# Patient Record
Sex: FEMALE | Race: WHITE | Employment: FULL TIME | URBAN - NONMETROPOLITAN AREA
[De-identification: names, ages, dates, MRNs, and addresses within clinical notes are randomized per-mention and may not be internally consistent; named-entity substitution may affect disease eponyms.]

---

## 2017-01-09 ENCOUNTER — OFFICE VISIT (OUTPATIENT)
Dept: PRIMARY CARE CLINIC | Age: 27
End: 2017-01-09
Payer: COMMERCIAL

## 2017-01-09 VITALS
WEIGHT: 293 LBS | TEMPERATURE: 97.8 F | RESPIRATION RATE: 16 BRPM | BODY MASS INDEX: 43.4 KG/M2 | DIASTOLIC BLOOD PRESSURE: 70 MMHG | HEIGHT: 69 IN | SYSTOLIC BLOOD PRESSURE: 110 MMHG | HEART RATE: 74 BPM | OXYGEN SATURATION: 98 %

## 2017-01-09 DIAGNOSIS — R63.5 WEIGHT GAIN: Primary | ICD-10-CM

## 2017-01-09 DIAGNOSIS — M72.2 PLANTAR FASCIITIS, BILATERAL: ICD-10-CM

## 2017-01-09 DIAGNOSIS — F41.9 ANXIETY: ICD-10-CM

## 2017-01-09 LAB — TSH SERPL DL<=0.05 MIU/L-ACNC: 3.96 UIU/ML (ref 0.27–4.2)

## 2017-01-09 PROCEDURE — 36415 COLL VENOUS BLD VENIPUNCTURE: CPT | Performed by: NURSE PRACTITIONER

## 2017-01-09 PROCEDURE — 99214 OFFICE O/P EST MOD 30 MIN: CPT | Performed by: NURSE PRACTITIONER

## 2017-01-09 RX ORDER — PHENTERMINE HYDROCHLORIDE 37.5 MG/1
37.5 CAPSULE ORAL EVERY MORNING
Qty: 30 CAPSULE | Refills: 0 | Status: SHIPPED | OUTPATIENT
Start: 2017-01-09 | End: 2017-04-24 | Stop reason: SDUPTHER

## 2017-01-09 RX ORDER — METHYLPREDNISOLONE 4 MG/1
TABLET ORAL
Qty: 1 KIT | Refills: 0 | Status: SHIPPED | OUTPATIENT
Start: 2017-01-09 | End: 2017-09-19 | Stop reason: SDUPTHER

## 2017-01-09 RX ORDER — FLUOXETINE 20 MG/1
30 TABLET, FILM COATED ORAL DAILY
Qty: 45 TABLET | Refills: 3 | Status: SHIPPED | OUTPATIENT
Start: 2017-01-09 | End: 2017-02-13 | Stop reason: ALTCHOICE

## 2017-01-10 PROBLEM — F41.9 ANXIETY: Status: ACTIVE | Noted: 2017-01-10

## 2017-02-13 ENCOUNTER — OFFICE VISIT (OUTPATIENT)
Dept: PRIMARY CARE CLINIC | Age: 27
End: 2017-02-13
Payer: COMMERCIAL

## 2017-02-13 VITALS
HEART RATE: 67 BPM | OXYGEN SATURATION: 99 % | BODY MASS INDEX: 43.4 KG/M2 | WEIGHT: 293 LBS | RESPIRATION RATE: 16 BRPM | DIASTOLIC BLOOD PRESSURE: 80 MMHG | HEIGHT: 69 IN | TEMPERATURE: 97.9 F | SYSTOLIC BLOOD PRESSURE: 110 MMHG

## 2017-02-13 DIAGNOSIS — F41.8 DEPRESSION WITH ANXIETY: Primary | ICD-10-CM

## 2017-02-13 PROCEDURE — 99213 OFFICE O/P EST LOW 20 MIN: CPT | Performed by: NURSE PRACTITIONER

## 2017-02-13 RX ORDER — CLONAZEPAM 1 MG/1
TABLET ORAL
Qty: 60 TABLET | Refills: 0 | Status: SHIPPED | OUTPATIENT
Start: 2017-02-13 | End: 2017-04-24 | Stop reason: SDUPTHER

## 2017-02-27 ENCOUNTER — OFFICE VISIT (OUTPATIENT)
Dept: PRIMARY CARE CLINIC | Age: 27
End: 2017-02-27
Payer: COMMERCIAL

## 2017-02-27 VITALS
HEIGHT: 69 IN | BODY MASS INDEX: 43.4 KG/M2 | RESPIRATION RATE: 16 BRPM | DIASTOLIC BLOOD PRESSURE: 80 MMHG | WEIGHT: 293 LBS | SYSTOLIC BLOOD PRESSURE: 112 MMHG | TEMPERATURE: 98.1 F | OXYGEN SATURATION: 98 % | HEART RATE: 63 BPM

## 2017-02-27 DIAGNOSIS — F41.8 DEPRESSION WITH ANXIETY: Primary | ICD-10-CM

## 2017-02-27 PROCEDURE — 99213 OFFICE O/P EST LOW 20 MIN: CPT | Performed by: NURSE PRACTITIONER

## 2017-03-06 RX ORDER — PERMETHRIN 50 MG/G
CREAM TOPICAL
Qty: 1 TUBE | Refills: 1 | Status: SHIPPED | OUTPATIENT
Start: 2017-03-06 | End: 2017-04-24

## 2017-04-24 ENCOUNTER — OFFICE VISIT (OUTPATIENT)
Dept: PRIMARY CARE CLINIC | Age: 27
End: 2017-04-24
Payer: COMMERCIAL

## 2017-04-24 VITALS
TEMPERATURE: 97.4 F | BODY MASS INDEX: 43.4 KG/M2 | RESPIRATION RATE: 16 BRPM | SYSTOLIC BLOOD PRESSURE: 110 MMHG | HEART RATE: 81 BPM | WEIGHT: 293 LBS | HEIGHT: 69 IN | DIASTOLIC BLOOD PRESSURE: 70 MMHG

## 2017-04-24 DIAGNOSIS — F41.8 DEPRESSION WITH ANXIETY: Primary | ICD-10-CM

## 2017-04-24 PROCEDURE — 99213 OFFICE O/P EST LOW 20 MIN: CPT | Performed by: NURSE PRACTITIONER

## 2017-04-24 RX ORDER — FLUOXETINE HYDROCHLORIDE 20 MG/1
20 CAPSULE ORAL DAILY
Qty: 30 CAPSULE | Refills: 11 | Status: SHIPPED | OUTPATIENT
Start: 2017-04-24 | End: 2018-05-22 | Stop reason: SDUPTHER

## 2017-04-24 RX ORDER — PHENTERMINE HYDROCHLORIDE 37.5 MG/1
37.5 CAPSULE ORAL EVERY MORNING
Qty: 30 CAPSULE | Refills: 0 | Status: SHIPPED | OUTPATIENT
Start: 2017-04-24 | End: 2017-05-24

## 2017-04-24 RX ORDER — CLONAZEPAM 1 MG/1
TABLET ORAL
Qty: 30 TABLET | Refills: 0 | Status: SHIPPED | OUTPATIENT
Start: 2017-04-24 | End: 2018-03-20

## 2017-05-01 PROBLEM — F41.8 DEPRESSION WITH ANXIETY: Status: ACTIVE | Noted: 2017-05-01

## 2017-06-08 ENCOUNTER — TELEPHONE (OUTPATIENT)
Dept: PRIMARY CARE CLINIC | Age: 27
End: 2017-06-08

## 2017-06-08 RX ORDER — CIPROFLOXACIN 250 MG/1
250 TABLET, FILM COATED ORAL 2 TIMES DAILY
Qty: 14 TABLET | Refills: 0 | Status: SHIPPED | OUTPATIENT
Start: 2017-06-08 | End: 2017-06-15

## 2017-09-19 ENCOUNTER — OFFICE VISIT (OUTPATIENT)
Dept: PRIMARY CARE CLINIC | Age: 27
End: 2017-09-19
Payer: COMMERCIAL

## 2017-09-19 VITALS
RESPIRATION RATE: 16 BRPM | TEMPERATURE: 97.6 F | HEART RATE: 84 BPM | HEIGHT: 69 IN | BODY MASS INDEX: 43.4 KG/M2 | WEIGHT: 293 LBS

## 2017-09-19 DIAGNOSIS — J40 BRONCHITIS: Primary | ICD-10-CM

## 2017-09-19 DIAGNOSIS — J02.9 SORE THROAT: ICD-10-CM

## 2017-09-19 LAB — S PYO AG THROAT QL: NORMAL

## 2017-09-19 PROCEDURE — 99213 OFFICE O/P EST LOW 20 MIN: CPT | Performed by: NURSE PRACTITIONER

## 2017-09-19 PROCEDURE — 87880 STREP A ASSAY W/OPTIC: CPT | Performed by: NURSE PRACTITIONER

## 2017-09-19 PROCEDURE — 96372 THER/PROPH/DIAG INJ SC/IM: CPT | Performed by: NURSE PRACTITIONER

## 2017-09-19 RX ORDER — GUAIFENESIN AND CODEINE PHOSPHATE 100; 10 MG/5ML; MG/5ML
10 SOLUTION ORAL EVERY 4 HOURS PRN
Qty: 120 ML | Refills: 0 | Status: SHIPPED | OUTPATIENT
Start: 2017-09-19 | End: 2017-09-26

## 2017-09-19 RX ORDER — METHYLPREDNISOLONE 4 MG/1
TABLET ORAL
Qty: 1 KIT | Refills: 0 | Status: SHIPPED | OUTPATIENT
Start: 2017-09-19 | End: 2017-09-25

## 2017-09-19 RX ORDER — TRIAMCINOLONE ACETONIDE 40 MG/ML
40 INJECTION, SUSPENSION INTRA-ARTICULAR; INTRAMUSCULAR ONCE
Status: COMPLETED | OUTPATIENT
Start: 2017-09-19 | End: 2017-09-19

## 2017-09-19 RX ORDER — AZITHROMYCIN 250 MG/1
TABLET, FILM COATED ORAL
Qty: 1 PACKET | Refills: 0 | Status: SHIPPED | OUTPATIENT
Start: 2017-09-19 | End: 2017-09-29

## 2017-09-19 RX ADMIN — TRIAMCINOLONE ACETONIDE 40 MG: 40 INJECTION, SUSPENSION INTRA-ARTICULAR; INTRAMUSCULAR at 17:15

## 2017-09-19 ASSESSMENT — ENCOUNTER SYMPTOMS
SINUS PRESSURE: 1
SORE THROAT: 1
COUGH: 1

## 2018-03-01 DIAGNOSIS — Z32.00 POSSIBLE PREGNANCY: ICD-10-CM

## 2018-03-01 DIAGNOSIS — Z32.00 POSSIBLE PREGNANCY: Primary | ICD-10-CM

## 2018-03-01 LAB — GONADOTROPIN, CHORIONIC (HCG) QUANT: 1134 MIU/ML (ref 0–5.3)

## 2018-03-02 ENCOUNTER — TELEPHONE (OUTPATIENT)
Dept: PRIMARY CARE CLINIC | Age: 28
End: 2018-03-02

## 2018-03-02 RX ORDER — .BETA.-CAROTENE, ASCORBIC ACID, CHOLECALCIFEROL, .ALPHA.-TOCOPHEROL ACETATE, DL-, THIAMINE MONONITRATE, RIBOFLAVIN, NIACINAMIDE, PYRIDOXINE HYDROCHLORIDE, FOLIC ACID, CYANOCOBALAMIN, CALCIUM PANTOTHENATE, CALCIUM CARBONATE, FERROUS FUMARATE, ZINC OXIDE, AND DOCUSATE SODIUM 1000; 100; 400; 30; 3; 3; 15; 20; 1; 12; 7; 200; 29; 20; 25 [IU]/1; MG/1; [IU]/1; [IU]/1; MG/1; MG/1; MG/1; MG/1; MG/1; UG/1; MG/1; MG/1; MG/1; MG/1; MG/1
1 TABLET, COATED ORAL DAILY
Qty: 30 TABLET | Refills: 5 | Status: SHIPPED | OUTPATIENT
Start: 2018-03-02 | End: 2019-02-17

## 2018-03-20 ENCOUNTER — HOSPITAL ENCOUNTER (OUTPATIENT)
Dept: ULTRASOUND IMAGING | Age: 28
Discharge: HOME | End: 2018-03-20
Payer: COMMERCIAL

## 2018-03-20 ENCOUNTER — INITIAL PRENATAL (OUTPATIENT)
Dept: OBGYN | Age: 28
End: 2018-03-20

## 2018-03-20 VITALS
WEIGHT: 293 LBS | SYSTOLIC BLOOD PRESSURE: 123 MMHG | HEART RATE: 93 BPM | DIASTOLIC BLOOD PRESSURE: 84 MMHG | BODY MASS INDEX: 46.96 KG/M2

## 2018-03-20 DIAGNOSIS — Z12.4 SCREENING FOR CERVICAL CANCER: ICD-10-CM

## 2018-03-20 DIAGNOSIS — R35.0 URINARY FREQUENCY: ICD-10-CM

## 2018-03-20 DIAGNOSIS — O36.80X0 ENCOUNTER TO DETERMINE FETAL VIABILITY OF PREGNANCY, SINGLE OR UNSPECIFIED FETUS: ICD-10-CM

## 2018-03-20 DIAGNOSIS — Z11.3 SCREEN FOR STD (SEXUALLY TRANSMITTED DISEASE): ICD-10-CM

## 2018-03-20 DIAGNOSIS — Z34.81 ENCOUNTER FOR SUPERVISION OF OTHER NORMAL PREGNANCY IN FIRST TRIMESTER: Primary | ICD-10-CM

## 2018-03-20 LAB
APPEARANCE FLUID: NORMAL
BILIRUBIN, POC: NORMAL
BLOOD URINE, POC: NORMAL
CLARITY, POC: NORMAL
COLOR, POC: NORMAL
GLUCOSE URINE, POC: NORMAL
KETONES, POC: NORMAL
LEUKOCYTE EST, POC: NORMAL
NITRITE, POC: NORMAL
PH, POC: 5.5
PROTEIN, POC: 3
SPECIFIC GRAVITY, POC: 1.02
UROBILINOGEN, POC: NORMAL

## 2018-03-20 PROCEDURE — 76817 TRANSVAGINAL US OBSTETRIC: CPT

## 2018-03-20 PROCEDURE — 0500F INITIAL PRENATAL CARE VISIT: CPT | Performed by: NURSE PRACTITIONER

## 2018-03-20 NOTE — PROGRESS NOTES
Pt presents today for Initial prenatal exam. She had pregnancy confirmed with HCG. She is c/o cramping and urinary urgency. She denies any vaginal bleeding, leaking. No fetal movement. Last pap smear : 2014, pap done today. This is her 3rd pregnancy. She had normal pregnancy with vaginal delivery last pregnancy. First pregnancy was miscarriage. Will try to have viability US today, otherwise schedule. Plan of care was discussed with patient. Patient was encouraged to adhere to a well-balanced diet, including increasing water intake and limiting excessive caffeine and salt. The benefits of exercise were discussed; however she was advised against heavy lifting, sit-ups and abdominal crunches. A list of safe OTC medications was provided and discussed. The patient was cautioned against the use of tanning beds, hot tubs, saunas, and x-rays. Avoidance of tobacco, alcohol and illicit drugs was also discussed due to harmful effects on the fetus and increased risks associated with pregnancy. Certain labs and ultrasounds are required at certain times during pregnancy but others are optional, including the serum integrated screen/Clint/AFP/ Panorama, and other genetic testing. The patient was encouraged to attend childbirth classes and general hospital information was provided based on patients hospital of choice. All questions answered.

## 2018-04-19 ENCOUNTER — ROUTINE PRENATAL (OUTPATIENT)
Dept: OBGYN | Age: 28
End: 2018-04-19

## 2018-04-19 VITALS — WEIGHT: 293 LBS | DIASTOLIC BLOOD PRESSURE: 86 MMHG | BODY MASS INDEX: 49.03 KG/M2 | SYSTOLIC BLOOD PRESSURE: 124 MMHG

## 2018-04-19 DIAGNOSIS — O09.291 SUPERVISION OF HIGH-RISK PREGNANCY WITH HISTORY OF ABORTION IN FIRST TRIMESTER: Primary | ICD-10-CM

## 2018-04-19 DIAGNOSIS — Z36.9 ENCOUNTER FOR ANTENATAL SCREENING OF MOTHER: ICD-10-CM

## 2018-04-19 PROCEDURE — 0502F SUBSEQUENT PRENATAL CARE: CPT | Performed by: NURSE PRACTITIONER

## 2018-04-23 ENCOUNTER — TELEPHONE (OUTPATIENT)
Dept: OBGYN | Age: 28
End: 2018-04-23

## 2018-04-26 ENCOUNTER — TELEPHONE (OUTPATIENT)
Dept: OBGYN | Age: 28
End: 2018-04-26

## 2018-05-17 ENCOUNTER — ROUTINE PRENATAL (OUTPATIENT)
Dept: OBGYN | Age: 28
End: 2018-05-17

## 2018-05-17 VITALS
WEIGHT: 293 LBS | HEART RATE: 88 BPM | BODY MASS INDEX: 49.62 KG/M2 | DIASTOLIC BLOOD PRESSURE: 90 MMHG | SYSTOLIC BLOOD PRESSURE: 124 MMHG

## 2018-05-17 DIAGNOSIS — O09.292 SUPERVISION OF HIGH-RISK PREGNANCY WITH HISTORY OF ABORTION IN SECOND TRIMESTER: Primary | ICD-10-CM

## 2018-05-17 LAB
ABO/RH: NORMAL
ANTIBODY SCREEN: NORMAL
CHLAMYDIA TRACHOMATIS AMPLIFIED DET: NEGATIVE
HEPATITIS B SURFACE ANTIGEN INTERPRETATION: NORMAL
HIV-1 AND HIV-2 ANTIBODIES: NORMAL
MS ALPHA-FETOPROTEIN: NORMAL
N GONORRHOEAE AMPLIFIED DET: NEGATIVE
RPR: NORMAL
RUBELLA ANTIBODY IGG: NORMAL

## 2018-05-17 PROCEDURE — 0502F SUBSEQUENT PRENATAL CARE: CPT | Performed by: OBSTETRICS & GYNECOLOGY

## 2018-05-22 ENCOUNTER — TELEPHONE (OUTPATIENT)
Dept: OBGYN | Age: 28
End: 2018-05-22

## 2018-05-22 RX ORDER — AMOXICILLIN AND CLAVULANATE POTASSIUM 875; 125 MG/1; MG/1
1 TABLET, FILM COATED ORAL 2 TIMES DAILY
Qty: 14 TABLET | Refills: 0 | Status: SHIPPED | OUTPATIENT
Start: 2018-05-22 | End: 2018-05-29

## 2018-05-22 RX ORDER — FLUOXETINE HYDROCHLORIDE 20 MG/1
20 CAPSULE ORAL DAILY
Qty: 30 CAPSULE | Refills: 11 | Status: SHIPPED | OUTPATIENT
Start: 2018-05-22 | End: 2018-06-15 | Stop reason: SDUPTHER

## 2018-06-15 RX ORDER — FLUOXETINE HYDROCHLORIDE 20 MG/1
20 CAPSULE ORAL DAILY
Qty: 30 CAPSULE | Refills: 11 | Status: SHIPPED | OUTPATIENT
Start: 2018-06-15 | End: 2019-02-07 | Stop reason: SDUPTHER

## 2018-06-15 RX ORDER — LORATADINE 10 MG/1
10 TABLET ORAL DAILY
Qty: 30 TABLET | Refills: 3 | Status: SHIPPED | OUTPATIENT
Start: 2018-06-15 | End: 2018-09-26

## 2018-06-20 ENCOUNTER — ROUTINE PRENATAL (OUTPATIENT)
Dept: OBGYN | Age: 28
End: 2018-06-20

## 2018-06-20 ENCOUNTER — HOSPITAL ENCOUNTER (OUTPATIENT)
Dept: ULTRASOUND IMAGING | Age: 28
Discharge: HOME | End: 2018-06-20
Payer: COMMERCIAL

## 2018-06-20 VITALS
WEIGHT: 293 LBS | DIASTOLIC BLOOD PRESSURE: 82 MMHG | SYSTOLIC BLOOD PRESSURE: 130 MMHG | BODY MASS INDEX: 49.32 KG/M2 | HEART RATE: 98 BPM

## 2018-06-20 DIAGNOSIS — O09.292 SUPERVISION OF HIGH-RISK PREGNANCY WITH HISTORY OF ABORTION IN SECOND TRIMESTER: Primary | ICD-10-CM

## 2018-06-20 DIAGNOSIS — O09.292 SUPERVISION OF HIGH-RISK PREGNANCY WITH HISTORY OF ABORTION IN SECOND TRIMESTER: ICD-10-CM

## 2018-06-20 PROCEDURE — 0502F SUBSEQUENT PRENATAL CARE: CPT | Performed by: OBSTETRICS & GYNECOLOGY

## 2018-06-20 PROCEDURE — 76805 OB US >/= 14 WKS SNGL FETUS: CPT

## 2018-07-18 ENCOUNTER — ROUTINE PRENATAL (OUTPATIENT)
Dept: OBGYN | Age: 28
End: 2018-07-18

## 2018-07-18 VITALS — SYSTOLIC BLOOD PRESSURE: 124 MMHG | WEIGHT: 293 LBS | DIASTOLIC BLOOD PRESSURE: 80 MMHG | BODY MASS INDEX: 49.91 KG/M2

## 2018-07-18 DIAGNOSIS — O26.899 RH NEGATIVE STATE IN ANTEPARTUM PERIOD: ICD-10-CM

## 2018-07-18 DIAGNOSIS — Z67.91 RH NEGATIVE STATE IN ANTEPARTUM PERIOD: ICD-10-CM

## 2018-07-18 DIAGNOSIS — O09.292 SUPERVISION OF HIGH-RISK PREGNANCY WITH HISTORY OF ABORTION IN SECOND TRIMESTER: Primary | ICD-10-CM

## 2018-07-18 DIAGNOSIS — R73.09 ABNORMAL GLUCOSE MEASUREMENT: ICD-10-CM

## 2018-07-18 DIAGNOSIS — O09.292 SUPERVISION OF HIGH-RISK PREGNANCY WITH HISTORY OF ABORTION IN SECOND TRIMESTER: ICD-10-CM

## 2018-07-18 LAB
GLUCOSE, 1HR PP: 175 MG/DL (ref 75–140)
HCT VFR BLD CALC: 35.1 % (ref 37–47)
HEMOGLOBIN: 11.4 G/DL (ref 12–16)
MCH RBC QN AUTO: 29.1 PG (ref 27–31)
MCHC RBC AUTO-ENTMCNC: 32.5 G/DL (ref 33–37)
MCV RBC AUTO: 89.5 FL (ref 81–99)
PDW BLD-RTO: 13.7 % (ref 11.5–14.5)
PLATELET # BLD: 245 K/UL (ref 130–400)
PMV BLD AUTO: 11.7 FL (ref 9.4–12.3)
RBC # BLD: 3.92 M/UL (ref 4.2–5.4)
WBC # BLD: 9.5 K/UL (ref 4.8–10.8)

## 2018-07-18 PROCEDURE — 0502F SUBSEQUENT PRENATAL CARE: CPT | Performed by: OBSTETRICS & GYNECOLOGY

## 2018-07-18 NOTE — PATIENT INSTRUCTIONS
Patient Education        Weeks 22 to 26 of Your Pregnancy: Care Instructions  Your Care Instructions    As you enter your 7th month of pregnancy at week 26, your baby's lungs are growing stronger and getting ready to breathe. You may notice that your baby responds to the sound of your or your partner's voice. You may also notice that your baby does less turning and twisting and more squirming or jerking. Jerking often means that your baby has the hiccups. Hiccups are perfectly normal and are only temporary. You may want to think about attending a childbirth preparation class. This is also a good time to start thinking about whether you want to have pain medicine during labor. Most pregnant women are tested for gestational diabetes between weeks 25 and 28. Gestational diabetes occurs when your blood sugar level gets too high when you're pregnant. The test is important, because you can have gestational diabetes and not know it. But the condition can cause problems for your baby. Follow-up care is a key part of your treatment and safety. Be sure to make and go to all appointments, and call your doctor if you are having problems. It's also a good idea to know your test results and keep a list of the medicines you take. How can you care for yourself at home? Ease discomfort from your baby's kicking  · Change your position. Sometimes this will cause your baby to change position too. · Take a deep breath while you raise your arm over your head. Then breathe out while you drop your arm. Do Kegel exercises to prevent urine from leaking  · You can do Kegel exercises while you stand or sit. ¨ Squeeze the same muscles you would use to stop your urine. Your belly and thighs should not move. ¨ Hold the squeeze for 3 seconds, and then relax for 3 seconds. ¨ Start with 3 seconds. Then add 1 second each week until you are able to squeeze for 10 seconds. ¨ Repeat the exercise 10 to 15 times for each session.  Do three or more sessions each day. Ease or reduce swelling in your feet, ankles, hands, and fingers  · If your fingers are puffy, take off your rings. · Do not eat high-salt foods, such as potato chips. · Prop up your feet on a stool or couch as much as possible. Sleep with pillows under your feet. · Do not stand for long periods of time or wear tight shoes. · Wear support stockings. Where can you learn more? Go to https://ABOVE Solutions.Birchstreet [email or web address] org and sign in to your Whole Sale Fund account. Enter G264 in the TSO3 box to learn more about \"Weeks 22 to 26 of Your Pregnancy: Care Instructions. \"     If you do not have an account, please click on the \"Sign Up Now\" link. Current as of: November 21, 2017  Content Version: 11.6  © 9635-7100 You.i, Incorporated. Care instructions adapted under license by [facility] ([facility]). If you have questions about a medical condition or this instruction, always ask your healthcare professional. [address] any warranty or liability for your use of this information.

## 2018-08-14 DIAGNOSIS — Z67.91 RH NEGATIVE STATE IN ANTEPARTUM PERIOD: ICD-10-CM

## 2018-08-14 DIAGNOSIS — O09.292 SUPERVISION OF HIGH-RISK PREGNANCY WITH HISTORY OF ABORTION IN SECOND TRIMESTER: ICD-10-CM

## 2018-08-14 DIAGNOSIS — R73.09 ABNORMAL GLUCOSE MEASUREMENT: ICD-10-CM

## 2018-08-14 DIAGNOSIS — O26.899 RH NEGATIVE STATE IN ANTEPARTUM PERIOD: ICD-10-CM

## 2018-08-15 ENCOUNTER — ROUTINE PRENATAL (OUTPATIENT)
Dept: OBGYN | Age: 28
End: 2018-08-15
Payer: COMMERCIAL

## 2018-08-15 VITALS — DIASTOLIC BLOOD PRESSURE: 80 MMHG | WEIGHT: 293 LBS | BODY MASS INDEX: 50.21 KG/M2 | SYSTOLIC BLOOD PRESSURE: 138 MMHG

## 2018-08-15 DIAGNOSIS — O26.892 RH NEGATIVE STATUS DURING PREGNANCY IN SECOND TRIMESTER: ICD-10-CM

## 2018-08-15 DIAGNOSIS — O09.293 SUPERVISION OF HIGH-RISK PREGNANCY WITH HISTORY OF ABORTION IN THIRD TRIMESTER: Primary | ICD-10-CM

## 2018-08-15 DIAGNOSIS — Z67.91 RH NEGATIVE STATUS DURING PREGNANCY IN SECOND TRIMESTER: ICD-10-CM

## 2018-08-15 LAB
ABO/RH: NORMAL
ANTIBODY SCREEN: NORMAL
GLUCOSE FASTING: 104 MG/DL (ref 75–110)
GLUCOSE TOLERANCE TEST 1 HOUR: 130 MG/DL (ref 75–190)
GLUCOSE TOLERANCE TEST 2 HOUR: 68 MG/DL (ref 75–140)
GLUCOSE, 3 HOUR: 66 MG/DL (ref 75–145)
RHIG LOT NUMBER: NORMAL

## 2018-08-15 PROCEDURE — 96372 THER/PROPH/DIAG INJ SC/IM: CPT | Performed by: OBSTETRICS & GYNECOLOGY

## 2018-08-15 PROCEDURE — 0502F SUBSEQUENT PRENATAL CARE: CPT | Performed by: OBSTETRICS & GYNECOLOGY

## 2018-08-15 NOTE — PROGRESS NOTES
Pt presents today for a routine prenatal visit. Pt denies vaginal bleeding, leaking of fluid or cramping. Pt states + fetal movement. 3D U/S today, rhogam injection today. After obtaining consent, and per orders of Dr. Talia Carrasquillo, injection of rhogam given in Right upper quad. gluteus by Oleksandr Robert. Patient instructed to remain in clinic for 20 minutes afterwards, and to report any adverse reaction to me immediately.

## 2018-08-15 NOTE — PATIENT INSTRUCTIONS
Patient Education        Weeks 26 to 30 of Your Pregnancy: Care Instructions  Your Care Instructions    You are now in your last trimester of pregnancy. Your baby is growing rapidly. And you'll probably feel your baby moving around more often. Your doctor may ask you to count your baby's kicks. Your back may ache as your body gets used to your baby's size and length. If you haven't already had the Tdap shot during this pregnancy, talk to your doctor about getting it. It will help protect your  against pertussis infection. During this time, it's important to take care of yourself and pay attention to what your body needs. If you feel sexual, explore ways to be close with your partner that match your comfort and desire. Use the tips provided in this care sheet to find ways to be sexual in your own way. Follow-up care is a key part of your treatment and safety. Be sure to make and go to all appointments, and call your doctor if you are having problems. It's also a good idea to know your test results and keep a list of the medicines you take. How can you care for yourself at home? Take it easy at work  · Take frequent breaks. If possible, stop working when you are tired, and rest during your lunch hour. · Take bathroom breaks every 2 hours. · Change positions often. If you sit for long periods, stand up and walk around. · When you stand for a long time, keep one foot on a low stool with your knee bent. After standing a lot, sit with your feet up. · Avoid fumes, chemicals, and tobacco smoke. Be sexual in your own way  · Having sex during pregnancy is okay, unless your doctor tells you not to. · You may be very interested in sex, or you may have no interest at all. · Your growing belly can make it hard to find a good position during intercourse. [place] and explore. · You may get cramps in your uterus when your partner touches your breasts.   · A back rub may relieve the backache or cramps that sometimes follow orgasm. Learn about  labor  · Watch for signs of  labor. You may be going into labor if:  ¨ You have menstrual-like cramps, with or without nausea. ¨ You have about 6 or more contractions in 1 hour, even after you have had a glass of water and are resting. ¨ You have a low, dull backache that does not go away when you change your position. ¨ You have pain or pressure in your pelvis that comes and goes in a pattern. ¨ You have intestinal cramping or flu-like symptoms, with or without diarrhea. ¨ You notice an increase or change in your vaginal discharge. Discharge may be heavy, mucus-like, watery, or streaked with blood. ¨ Your water breaks. · If you think you have  labor:  ¨ Drink 2 or 3 glasses of water or juice. Not drinking enough fluids can cause contractions. ¨ Stop what you are doing, and empty your bladder. Then lie down on your left side for at least 1 hour. ¨ While lying on your side, find your breast bone. Put your fingers in the soft spot just below it. Move your fingers down toward your belly button to find the top of your uterus. Check to see if it is tight. ¨ Contractions can be weak or strong. Record your contractions for an hour. Time a contraction from the start of one contraction to the start of the next one. ¨ Single or several strong contractions without a pattern are called [facility] contractions. They are practice contractions but not the start of labor. They often stop if you change what you are doing. ¨ Call your doctor if you have regular contractions. Where can you learn more? Go to https://[email or web address].Coinkite. org and sign in to your [email or web address] account. Enter H751 in the Reach.ly box to learn more about \"Weeks 26 to 30 of Your Pregnancy: Care Instructions. \"     If you do not have an account, please click on the \"Sign Up Now\" link.   Current as of: 2017  Content Version: 11.7  © 4562-4618 Healthwise, Incorporated. Care instructions adapted under license by [facility] ([facility]). If you have questions about a medical condition or this instruction, always ask your healthcare professional. [address] any warranty or liability for your use of this information.

## 2018-08-24 ENCOUNTER — TELEPHONE (OUTPATIENT)
Dept: OBGYN | Age: 28
End: 2018-08-24

## 2018-08-24 ENCOUNTER — HOSPITAL ENCOUNTER (OUTPATIENT)
Age: 28
Setting detail: OBSERVATION
Discharge: HOME | End: 2018-08-24
Attending: OBSTETRICS & GYNECOLOGY | Admitting: OBSTETRICS & GYNECOLOGY
Payer: COMMERCIAL

## 2018-08-24 VITALS
WEIGHT: 293 LBS | HEIGHT: 69 IN | HEART RATE: 84 BPM | DIASTOLIC BLOOD PRESSURE: 76 MMHG | BODY MASS INDEX: 43.4 KG/M2 | TEMPERATURE: 98 F | SYSTOLIC BLOOD PRESSURE: 146 MMHG | RESPIRATION RATE: 20 BRPM

## 2018-08-24 PROBLEM — R10.9 CRAMPING AFFECTING PREGNANCY, ANTEPARTUM: Status: ACTIVE | Noted: 2018-08-24

## 2018-08-24 PROBLEM — O26.899 CRAMPING AFFECTING PREGNANCY, ANTEPARTUM: Status: ACTIVE | Noted: 2018-08-24

## 2018-08-24 LAB
AMORPHOUS: ABNORMAL /HPF
BILIRUBIN URINE: NEGATIVE
BLOOD, URINE: NEGATIVE
CASTS 2: ABNORMAL /LPF
CASTS: ABNORMAL /LPF
CLARITY: ABNORMAL
COLOR: YELLOW
EPITHELIAL CELLS, UA: ABNORMAL /HPF
GLUCOSE URINE: NEGATIVE MG/DL
KETONES, URINE: NEGATIVE MG/DL
LEUKOCYTE ESTERASE, URINE: ABNORMAL
NITRITE, URINE: NEGATIVE
PH UA: 6
PROTEIN UA: NEGATIVE MG/DL
RBC UA: ABNORMAL /HPF (ref 0–2)
SPECIFIC GRAVITY UA: 1.02
UROBILINOGEN, URINE: 1 E.U./DL
WBC UA: ABNORMAL /HPF (ref 0–5)

## 2018-08-24 PROCEDURE — G0378 HOSPITAL OBSERVATION PER HR: HCPCS

## 2018-08-24 PROCEDURE — 4500000002 HC ER NO CHARGE

## 2018-08-24 PROCEDURE — 59025 FETAL NON-STRESS TEST: CPT

## 2018-08-24 PROCEDURE — 81001 URINALYSIS AUTO W/SCOPE: CPT

## 2018-08-24 RX ORDER — ONDANSETRON 2 MG/ML
4 INJECTION INTRAMUSCULAR; INTRAVENOUS EVERY 6 HOURS PRN
Status: DISCONTINUED | OUTPATIENT
Start: 2018-08-24 | End: 2018-08-24 | Stop reason: HOSPADM

## 2018-08-24 RX ORDER — ACETAMINOPHEN 325 MG/1
650 TABLET ORAL EVERY 4 HOURS PRN
Status: DISCONTINUED | OUTPATIENT
Start: 2018-08-24 | End: 2018-08-24 | Stop reason: HOSPADM

## 2018-08-29 ENCOUNTER — ROUTINE PRENATAL (OUTPATIENT)
Dept: OBGYN | Age: 28
End: 2018-08-29

## 2018-08-29 VITALS
DIASTOLIC BLOOD PRESSURE: 86 MMHG | HEART RATE: 98 BPM | SYSTOLIC BLOOD PRESSURE: 126 MMHG | WEIGHT: 293 LBS | BODY MASS INDEX: 50.21 KG/M2

## 2018-08-29 DIAGNOSIS — O09.293 SUPERVISION OF HIGH-RISK PREGNANCY WITH HISTORY OF ABORTION IN THIRD TRIMESTER: Primary | ICD-10-CM

## 2018-08-29 PROCEDURE — 0502F SUBSEQUENT PRENATAL CARE: CPT | Performed by: OBSTETRICS & GYNECOLOGY

## 2018-08-29 NOTE — PATIENT INSTRUCTIONS
Patient Education        Weeks 30 to 28 of Your Pregnancy: Care Instructions  Your Care Instructions    You have made it to the final months of your pregnancy. By now, your baby is really starting to look like a baby, with hair and plump skin. As you enter the final weeks of pregnancy, the reality of having a baby may start to set in. This is the time to settle on a name, get your household in order, set up a safe nursery, and find quality  if needed. Doing these things in advance will allow you to focus on caring for and enjoying your new baby. You may also want to have a tour of your hospital's labor and delivery unit to get a better idea of what to expect while you are in the hospital.  During these last months, it is very important to take good care of yourself and pay attention to what your body needs. If your doctor says it is okay for you to work, don't push yourself too hard. Use the tips provided in this care sheet to ease heartburn and care for varicose veins. If you haven't already had the Tdap shot during this pregnancy, talk to your doctor about getting it. It will help protect your  against pertussis infection. Follow-up care is a key part of your treatment and safety. Be sure to make and go to all appointments, and call your doctor if you are having problems. It's also a good idea to know your test results and keep a list of the medicines you take. How can you care for yourself at home? Pay attention to your baby's movements  · You should feel your baby move several times every day. · Your baby now turns less, and kicks and jabs more. · Your baby sleeps 20 to 45 minutes at a time and is more active at certain times of day. · If your doctor wants you to count your baby's kicks:  ¨ Empty your bladder, and lie on your side or relax in a comfortable chair. ¨ Write down your start time. ¨ Pay attention only to your baby's movements. Count any movement except hiccups.   ¨ After you have counted 10 movements, write down your stop time. ¨ Write down how many minutes it took for your baby to move 10 times. ¨ If an hour goes by and you have not recorded 10 movements, have something to eat or drink and then count for another hour. If you do not record 10 movements in either hour, call your doctor. Ease heartburn  · Eat small, frequent meals. · Do not eat chocolate, peppermint, or very spicy foods. Avoid drinks with caffeine, such as coffee, tea, and sodas. · Avoid bending over or lying down after meals. · Talk a short walk after you eat. · If heartburn is a problem at night, do not eat for 2 hours before bedtime. · Take antacids like Mylanta, Maalox, Rolaids, or Tums. Do not take antacids that have sodium bicarbonate. Care for varicose veins  · Varicose veins are blood vessels that stretch out with the extra blood during pregnancy. Your legs may ache or throb. Most varicose veins will go away after the birth. · Avoid standing for long periods of time. Sit with your legs crossed at the ankles, not the knees. · Sit with your feet propped up. · Avoid tight clothing or stockings. Wear support hose. · Exercise regularly. Try walking for at least 30 minutes a day. Where can you learn more? Go to https://Respect Network.Biocontrol. org and sign in to your Qwalytics account. Enter Z399 in the [facility] box to learn more about \"Weeks 30 to 32 of Your Pregnancy: Care Instructions. \"     If you do not have an account, please click on the \"Sign Up Now\" link. Current as of: November 21, 2017  Content Version: 11.7  © 8008-3929 Pikanote. Care instructions adapted under license by [facility] ([facility]). If you have questions about a medical condition or this instruction, always ask your healthcare professional. [address]  any warranty or liability for your use of this information.

## 2018-09-07 ENCOUNTER — TELEPHONE (OUTPATIENT)
Dept: OBGYN | Age: 28
End: 2018-09-07

## 2018-09-12 ENCOUNTER — ROUTINE PRENATAL (OUTPATIENT)
Dept: OBGYN | Age: 28
End: 2018-09-12

## 2018-09-12 VITALS — DIASTOLIC BLOOD PRESSURE: 88 MMHG | BODY MASS INDEX: 49.91 KG/M2 | WEIGHT: 293 LBS | SYSTOLIC BLOOD PRESSURE: 134 MMHG

## 2018-09-12 DIAGNOSIS — O09.293 SUPERVISION OF HIGH-RISK PREGNANCY WITH HISTORY OF ABORTION IN THIRD TRIMESTER: Primary | ICD-10-CM

## 2018-09-12 PROCEDURE — 0502F SUBSEQUENT PRENATAL CARE: CPT | Performed by: OBSTETRICS & GYNECOLOGY

## 2018-09-12 NOTE — PATIENT INSTRUCTIONS
taking an over-the-counter stool softener. Consider breastfeeding  · Experts recommend that women breastfeed for 1 year or longer. Breast milk is the perfect food for babies. · Breast milk is easier for babies to digest than formula. And it is always available, just the right temperature, and free. · Breast milk may help protect your child from some health problems.  babies are less likely than formula-fed babies to:  ¨ Get ear infections, colds, diarrhea, and pneumonia. ¨ Be obese or get diabetes later in life. · Women who breastfeed have less bleeding after the birth. Their uteruses also shrink back faster. · Some women who breastfeed lose weight faster. Making milk burns calories. · Breastfeeding can lower your risk of breast cancer, ovarian cancer, and osteoporosis. Decide about circumcision for boys  · As you make this decision, it may help to think about your personal, Buddhist, and family traditions. You get to decide if you will keep your son's penis natural or if he will be circumcised. · If you decide that you would like to have your baby circumcised, talk with your doctor. You can share your concerns about pain. And you can discuss your preferences for anesthesia. Where can you learn more? Go to https://SCIO Diamond CorporationpepicSensulineb.healthExistence Before Essence. org and sign in to your Buccaneer account. Enter Y189 in the Elixir Pharmaceuticals box to learn more about \"Weeks 32 to 34 of Your Pregnancy: Care Instructions. \"     If you do not have an account, please click on the \"Sign Up Now\" link. Current as of: November 21, 2017  Content Version: 11.7  © 0645-5148 Global Employment Solutions, Incorporated. Care instructions adapted under license by [facility] ([facility]). If you have questions about a medical condition or this instruction, always ask your healthcare professional. [place] [address] any warranty or liability for your use of this information.

## 2018-09-26 ENCOUNTER — ROUTINE PRENATAL (OUTPATIENT)
Dept: OBGYN | Age: 28
End: 2018-09-26

## 2018-09-26 VITALS
DIASTOLIC BLOOD PRESSURE: 86 MMHG | HEART RATE: 99 BPM | WEIGHT: 293 LBS | SYSTOLIC BLOOD PRESSURE: 132 MMHG | BODY MASS INDEX: 50.5 KG/M2

## 2018-09-26 DIAGNOSIS — O09.293 SUPERVISION OF HIGH-RISK PREGNANCY WITH HISTORY OF ABORTION IN THIRD TRIMESTER: Primary | ICD-10-CM

## 2018-09-26 PROCEDURE — 0502F SUBSEQUENT PRENATAL CARE: CPT | Performed by: OBSTETRICS & GYNECOLOGY

## 2018-09-26 NOTE — PROGRESS NOTES
Pt presents today for a routine prenatal visit. Pt denies vaginal bleeding, leaking of fluid or cramping. Pt states + fetal movement. GBS to be done next visit.

## 2018-10-02 ENCOUNTER — ROUTINE PRENATAL (OUTPATIENT)
Dept: OBGYN | Age: 28
End: 2018-10-02
Payer: COMMERCIAL

## 2018-10-02 VITALS
WEIGHT: 293 LBS | SYSTOLIC BLOOD PRESSURE: 138 MMHG | BODY MASS INDEX: 50.5 KG/M2 | HEART RATE: 100 BPM | DIASTOLIC BLOOD PRESSURE: 86 MMHG

## 2018-10-02 DIAGNOSIS — Z3A.35 35 WEEKS GESTATION OF PREGNANCY: ICD-10-CM

## 2018-10-02 DIAGNOSIS — O09.293 SUPERVISION OF HIGH-RISK PREGNANCY WITH HISTORY OF ABORTION IN THIRD TRIMESTER: ICD-10-CM

## 2018-10-02 DIAGNOSIS — O13.3 TRANSIENT HYPERTENSION OF PREGNANCY IN THIRD TRIMESTER: Primary | ICD-10-CM

## 2018-10-02 PROCEDURE — 0502F SUBSEQUENT PRENATAL CARE: CPT | Performed by: NURSE PRACTITIONER

## 2018-10-02 PROCEDURE — 59025 FETAL NON-STRESS TEST: CPT | Performed by: NURSE PRACTITIONER

## 2018-10-02 NOTE — PROGRESS NOTES
Pt is here for routine parental care. Pt denies vaginal bleeding, cramping or leaking of fluid. Pt states + fetal movement. Pt reports having elevated BP at work and feeling dizzy, 120/100. Pt denies headaches. NST: baseline FHR 130s, no contractions, +acels, no decels, moderate variability. Serial bp's normal. WE given until after baby born per pt request. To keep scheduled appt and call to be seen earlier for problems.  to continue checking bp at home. All questions answered.

## 2018-10-02 NOTE — PATIENT INSTRUCTIONS
blood pressure monitor to be sure that it is accurate and that the cuff fits you. Also ask your doctor to watch you to make sure that you are using it right. ¨ You should not eat, use tobacco products, or use medicine known to raise blood pressure (such as some nasal decongestant sprays) before you take your blood pressure. ¨ Avoid taking your blood pressure if you have just exercised or are nervous or upset. Rest at least 15 minutes before you take your blood pressure. · If your doctor advises, check the protein levels in your urine. Your doctor or nurse will show you how to do this. · Take your medicines exactly as prescribed. Call your doctor if you think you are having a problem with your medicine. · Do not smoke. Quitting smoking will help lower your blood pressure and improve your baby's growth and health. If you need help quitting, talk to your doctor about stop-smoking programs and medicines. These can increase your chances of quitting for good. · Eat a balanced and healthy diet that has lots of fruits and vegetables. · If your doctor advised bed rest, be sure to stay off your feet and rest as much as possible. ¨ Keep a phone, phone book, notepad, and pen near the bed where you can easily reach them. ¨ Gently stretch your legs every hour to maintain good blood flow. ¨ Have another family member pack snacks and lunch food in a cooler close to your bed. ¨ Use this time for activities that you usually cannot find time for, such as reading, craft projects, or letter writing. · You can keep track of your baby's health by noting the length of time it takes to count 10 movements (such as kicks, flutters, or rolls). Feeling 10 movements in less than 1 hour is considered normal. Track your baby's movements once each day, and bring this record with you to each prenatal visit. When should you call for help? Call 911 anytime you think you may need emergency care.  For example, call if:    · You passed out (lost consciousness).     · You have a seizure.    Call your doctor now or seek immediate medical care if:    · You have symptoms of preeclampsia, such as:  ¨ Sudden swelling of your face, hands, or feet. ¨ New vision problems (such as dimness or blurring). ¨ A severe headache.     · Your blood pressure is higher than it should be, or it rises suddenly.     · You have new nausea or vomiting.     · You think that you are in labor.     · You have pain in your belly or pelvis.    Watch closely for changes in your health, and be sure to contact your doctor if:    · You gain weight rapidly. Where can you learn more? Go to [email or web address].Pososhok.[email or web address] org and sign in to your WinAd account. Enter P671 in the DeliveryChef.in box to learn more about \"Preeclampsia: Care Instructions. \"     If you do not have an account, please click on the \"Sign Up Now\" link. Current as of: November 21, 2017  Content Version: 11.7  © 9318-8918 Zipit Wireless, Incorporated. Care instructions adapted under license by [facility] ([facility]). If you have questions about a medical condition or this instruction, always ask your healthcare professional. [place] [address] any warranty or liability for your use of this information.

## 2018-10-10 ENCOUNTER — HOSPITAL ENCOUNTER (OUTPATIENT)
Age: 28
Setting detail: OBSERVATION
Discharge: HOME | End: 2018-10-10
Attending: OBSTETRICS & GYNECOLOGY | Admitting: OBSTETRICS & GYNECOLOGY
Payer: COMMERCIAL

## 2018-10-10 ENCOUNTER — ROUTINE PRENATAL (OUTPATIENT)
Dept: OBGYN | Age: 28
End: 2018-10-10

## 2018-10-10 VITALS
HEART RATE: 104 BPM | TEMPERATURE: 98.7 F | HEIGHT: 69 IN | RESPIRATION RATE: 18 BRPM | WEIGHT: 293 LBS | BODY MASS INDEX: 43.4 KG/M2 | SYSTOLIC BLOOD PRESSURE: 130 MMHG | DIASTOLIC BLOOD PRESSURE: 84 MMHG

## 2018-10-10 VITALS
SYSTOLIC BLOOD PRESSURE: 142 MMHG | DIASTOLIC BLOOD PRESSURE: 93 MMHG | HEART RATE: 121 BPM | BODY MASS INDEX: 49.91 KG/M2 | WEIGHT: 293 LBS

## 2018-10-10 DIAGNOSIS — O09.293 SUPERVISION OF HIGH-RISK PREGNANCY WITH HISTORY OF ABORTION IN THIRD TRIMESTER: Primary | ICD-10-CM

## 2018-10-10 PROBLEM — I10 HTN (HYPERTENSION): Status: ACTIVE | Noted: 2018-10-10

## 2018-10-10 LAB
ALBUMIN SERPL-MCNC: 3.4 G/DL (ref 3.5–5.2)
ALP BLD-CCNC: 117 U/L (ref 35–104)
ALT SERPL-CCNC: <5 U/L (ref 5–33)
AMORPHOUS: ABNORMAL /HPF
ANION GAP SERPL CALCULATED.3IONS-SCNC: 14 MMOL/L (ref 7–19)
AST SERPL-CCNC: 8 U/L (ref 5–32)
BACTERIA: ABNORMAL /HPF
BASOPHILS ABSOLUTE: 0 K/UL (ref 0–0.2)
BASOPHILS RELATIVE PERCENT: 0.2 % (ref 0–1)
BILIRUB SERPL-MCNC: <0.2 MG/DL (ref 0.2–1.2)
BILIRUBIN URINE: NEGATIVE
BLOOD, URINE: NEGATIVE
BUN BLDV-MCNC: 10 MG/DL (ref 6–20)
CALCIUM SERPL-MCNC: 9.3 MG/DL (ref 8.6–10)
CHLORIDE BLD-SCNC: 106 MMOL/L (ref 98–111)
CLARITY: ABNORMAL
CO2: 18 MMOL/L (ref 22–29)
COLOR: ABNORMAL
CREAT SERPL-MCNC: 0.6 MG/DL (ref 0.5–0.9)
CREATININE URINE: 301.9 MG/DL (ref 4.2–622)
EOSINOPHILS ABSOLUTE: 0.1 K/UL (ref 0–0.6)
EOSINOPHILS RELATIVE PERCENT: 0.7 % (ref 0–5)
GFR NON-AFRICAN AMERICAN: >60
GLUCOSE BLD-MCNC: 110 MG/DL (ref 74–109)
GLUCOSE URINE: NEGATIVE MG/DL
HCT VFR BLD CALC: 34.8 % (ref 37–47)
HEMOGLOBIN: 11.6 G/DL (ref 12–16)
KETONES, URINE: NEGATIVE MG/DL
LEUKOCYTE ESTERASE, URINE: NEGATIVE
LYMPHOCYTES ABSOLUTE: 1.8 K/UL (ref 1.1–4.5)
LYMPHOCYTES RELATIVE PERCENT: 16.7 % (ref 20–40)
MCH RBC QN AUTO: 29.2 PG (ref 27–31)
MCHC RBC AUTO-ENTMCNC: 33.3 G/DL (ref 33–37)
MCV RBC AUTO: 87.7 FL (ref 81–99)
MONOCYTES ABSOLUTE: 0.7 K/UL (ref 0–0.9)
MONOCYTES RELATIVE PERCENT: 6.3 % (ref 0–10)
NEUTROPHILS ABSOLUTE: 8.1 K/UL (ref 1.5–7.5)
NEUTROPHILS RELATIVE PERCENT: 74.7 % (ref 50–65)
NITRITE, URINE: NEGATIVE
PDW BLD-RTO: 14 % (ref 11.5–14.5)
PH UA: 5.5
PLATELET # BLD: 244 K/UL (ref 130–400)
PMV BLD AUTO: 11.7 FL (ref 9.4–12.3)
POTASSIUM SERPL-SCNC: 4 MMOL/L (ref 3.5–5)
PROTEIN PROTEIN: 23 MG/DL (ref 15–45)
PROTEIN UA: 30 MG/DL
RBC # BLD: 3.97 M/UL (ref 4.2–5.4)
SODIUM BLD-SCNC: 138 MMOL/L (ref 136–145)
SPECIFIC GRAVITY UA: 1.04
TOTAL PROTEIN: 6.6 G/DL (ref 6.6–8.7)
UROBILINOGEN, URINE: 1 E.U./DL
WBC # BLD: 10.9 K/UL (ref 4.8–10.8)

## 2018-10-10 PROCEDURE — 84156 ASSAY OF PROTEIN URINE: CPT

## 2018-10-10 PROCEDURE — 85025 COMPLETE CBC W/AUTO DIFF WBC: CPT

## 2018-10-10 PROCEDURE — 0502F SUBSEQUENT PRENATAL CARE: CPT | Performed by: OBSTETRICS & GYNECOLOGY

## 2018-10-10 PROCEDURE — G0378 HOSPITAL OBSERVATION PER HR: HCPCS

## 2018-10-10 PROCEDURE — 80053 COMPREHEN METABOLIC PANEL: CPT

## 2018-10-10 PROCEDURE — 81001 URINALYSIS AUTO W/SCOPE: CPT

## 2018-10-10 PROCEDURE — 36415 COLL VENOUS BLD VENIPUNCTURE: CPT

## 2018-10-10 PROCEDURE — 82570 ASSAY OF URINE CREATININE: CPT

## 2018-10-10 RX ORDER — ONDANSETRON 2 MG/ML
4 INJECTION INTRAMUSCULAR; INTRAVENOUS EVERY 6 HOURS PRN
Status: DISCONTINUED | OUTPATIENT
Start: 2018-10-10 | End: 2018-10-10 | Stop reason: HOSPADM

## 2018-10-10 RX ORDER — SODIUM CHLORIDE 0.9 % (FLUSH) 0.9 %
10 SYRINGE (ML) INJECTION PRN
Status: DISCONTINUED | OUTPATIENT
Start: 2018-10-10 | End: 2018-10-10 | Stop reason: HOSPADM

## 2018-10-10 RX ORDER — ACETAMINOPHEN 325 MG/1
650 TABLET ORAL EVERY 4 HOURS PRN
Status: DISCONTINUED | OUTPATIENT
Start: 2018-10-10 | End: 2018-10-10 | Stop reason: HOSPADM

## 2018-10-10 RX ORDER — SODIUM CHLORIDE 0.9 % (FLUSH) 0.9 %
10 SYRINGE (ML) INJECTION EVERY 12 HOURS SCHEDULED
Status: DISCONTINUED | OUTPATIENT
Start: 2018-10-10 | End: 2018-10-10 | Stop reason: HOSPADM

## 2018-10-12 ENCOUNTER — TELEPHONE (OUTPATIENT)
Dept: OBGYN | Age: 28
End: 2018-10-12

## 2018-10-12 NOTE — TELEPHONE ENCOUNTER
Called to let patient know her FMLA forms were completed and have been faxed to the number on her paperwork. Patient has an upcoming appt on Wednesday and will pick them up then.  Owes $35 for her forms

## 2018-10-13 LAB — GROUP B STREP CULTURE: NORMAL

## 2018-10-16 PROBLEM — Z3A.37 37 WEEKS GESTATION OF PREGNANCY: Status: ACTIVE | Noted: 2018-10-16

## 2018-10-17 ENCOUNTER — ROUTINE PRENATAL (OUTPATIENT)
Dept: OBGYN | Age: 28
End: 2018-10-17

## 2018-10-17 VITALS — DIASTOLIC BLOOD PRESSURE: 88 MMHG | WEIGHT: 293 LBS | BODY MASS INDEX: 50.21 KG/M2 | SYSTOLIC BLOOD PRESSURE: 138 MMHG

## 2018-10-17 DIAGNOSIS — O09.293 SUPERVISION OF HIGH-RISK PREGNANCY WITH HISTORY OF ABORTION IN THIRD TRIMESTER: Primary | ICD-10-CM

## 2018-10-17 PROCEDURE — 0502F SUBSEQUENT PRENATAL CARE: CPT | Performed by: OBSTETRICS & GYNECOLOGY

## 2018-10-17 NOTE — PROGRESS NOTES
Pt presents today for a routine prenatal visit. Pt denies vaginal bleeding, leaking of fluid or cramping. Pt states + fetal movement. Patient states her BP was 155/100 yesterday and she went to L&D, but when she was there her BP was fine. Labor precautions given.

## 2018-10-17 NOTE — PATIENT INSTRUCTIONS
you.  · Try to stay calm when your baby cries. Your baby may get more upset if he or she senses that you are upset. Know how to care for your   · Your baby's umbilical cord stump will drop off on its own, usually between 1 and 2 weeks. To care for your baby's umbilical cord area:  ¨ Clean the area at the bottom of the cord 2 or 3 times a day. ¨ Pay special attention to the area where the cord attaches to the skin. ¨ Keep the diaper folded below the cord. ¨ Use a damp washcloth or cotton ball to sponge bathe your baby until the stump has come off. · Your baby's first dark stool is called meconium. After the meconium is passed, your baby will develop his or her own bowel pattern. ¨ Some babies, especially  babies, have several bowel movements a day. Others have one or two a day, or one every 2 to 3 days. ¨  babies often have loose, yellow stools. Formula-fed babies have more formed stools. ¨ If your baby's stools look like little pellets, he or she is constipated. After 2 days of constipation, call your baby's doctor. · If your baby will be circumcised, you can care for him at home. ¨ Gently rinse his penis with warm water after every diaper change. Do not try to remove the film that forms on the penis. This film will go away on its own. Pat dry. ¨ Put petroleum ointment, such as Vaseline, on the area of the diaper that will touch your baby's penis. This will keep the diaper from sticking to your baby. ¨ Ask the doctor about giving your baby acetaminophen (Tylenol) for pain. Where can you learn more? Go to https://chraneb.healthLogLogic. org and sign in to your Restorius account. Enter 68  97 in the AgreeYa Mobility - Onvelop box to learn more about \"Week 37 of Your Pregnancy: Care Instructions. \"     If you do not have an account, please click on the \"Sign Up Now\" link. Current as of: 2017  Content Version: 11.7  © 9803-6698 [employer]ac, [facility].  Care

## 2018-10-24 ENCOUNTER — HOSPITAL ENCOUNTER (OUTPATIENT)
Age: 28
Setting detail: OBSERVATION
Discharge: HOME | End: 2018-10-24
Attending: OBSTETRICS & GYNECOLOGY | Admitting: OBSTETRICS & GYNECOLOGY
Payer: COMMERCIAL

## 2018-10-24 ENCOUNTER — ROUTINE PRENATAL (OUTPATIENT)
Dept: OBGYN | Age: 28
End: 2018-10-24

## 2018-10-24 VITALS — SYSTOLIC BLOOD PRESSURE: 140 MMHG | DIASTOLIC BLOOD PRESSURE: 92 MMHG | WEIGHT: 293 LBS | BODY MASS INDEX: 50.8 KG/M2

## 2018-10-24 VITALS — HEART RATE: 112 BPM | DIASTOLIC BLOOD PRESSURE: 82 MMHG | SYSTOLIC BLOOD PRESSURE: 131 MMHG

## 2018-10-24 DIAGNOSIS — O16.3 HYPERTENSION DURING PREGNANCY IN THIRD TRIMESTER, UNSPECIFIED HYPERTENSION IN PREGNANCY TYPE: ICD-10-CM

## 2018-10-24 DIAGNOSIS — O09.293 SUPERVISION OF HIGH-RISK PREGNANCY WITH HISTORY OF ABORTION IN THIRD TRIMESTER: Primary | ICD-10-CM

## 2018-10-24 PROBLEM — I10 HYPERTENSION: Status: ACTIVE | Noted: 2018-10-24

## 2018-10-24 PROBLEM — O13.9 PIH (PREGNANCY INDUCED HYPERTENSION), ANTEPARTUM: Status: ACTIVE | Noted: 2018-10-24

## 2018-10-24 PROCEDURE — 0502F SUBSEQUENT PRENATAL CARE: CPT | Performed by: OBSTETRICS & GYNECOLOGY

## 2018-10-24 PROCEDURE — G0378 HOSPITAL OBSERVATION PER HR: HCPCS

## 2018-10-24 PROCEDURE — G0379 DIRECT REFER HOSPITAL OBSERV: HCPCS

## 2018-10-24 NOTE — PROGRESS NOTES
Discharge instructions given. Patient demonstrated good understanding of all. To return Monday October 29th at 8a.m. For induction. Discharged home in stable condition.

## 2018-10-24 NOTE — PATIENT INSTRUCTIONS
[email or web address] and sign in to your Berry White account. Enter B044 in the MyLuvs box to learn more about \"Week 38 of Your Pregnancy: Care Instructions. \"     If you do not have an account, please click on the \"Sign Up Now\" link. Current as of: November 21, 2017  Content Version: 11.7  © 7932-0066 SpaBooker, [facility]. Care instructions adapted under license by [facility] ([facility]). If you have questions about a medical condition or this instruction, always ask your healthcare professional. [place] [address] any warranty or liability for your use of this information.

## 2018-10-29 ENCOUNTER — APPOINTMENT (OUTPATIENT)
Dept: LABOR AND DELIVERY | Age: 28
End: 2018-10-29
Payer: COMMERCIAL

## 2018-10-29 ENCOUNTER — HOSPITAL ENCOUNTER (INPATIENT)
Age: 28
LOS: 3 days | Discharge: HOME HEALTH | End: 2018-11-01
Attending: OBSTETRICS & GYNECOLOGY | Admitting: OBSTETRICS & GYNECOLOGY
Payer: COMMERCIAL

## 2018-10-29 PROBLEM — Z3A.39 39 WEEKS GESTATION OF PREGNANCY: Status: ACTIVE | Noted: 2018-10-29

## 2018-10-29 LAB
ABO/RH: NORMAL
AMPHETAMINE SCREEN, URINE: NEGATIVE
ANTIBODY SCREEN: NORMAL
BARBITURATE SCREEN URINE: NEGATIVE
BENZODIAZEPINE SCREEN, URINE: NEGATIVE
CANNABINOID SCREEN URINE: NEGATIVE
COCAINE METABOLITE SCREEN URINE: NEGATIVE
HCT VFR BLD CALC: 34.7 % (ref 37–47)
HEMOGLOBIN: 11.3 G/DL (ref 12–16)
Lab: NORMAL
MCH RBC QN AUTO: 29.3 PG (ref 27–31)
MCHC RBC AUTO-ENTMCNC: 32.6 G/DL (ref 33–37)
MCV RBC AUTO: 89.9 FL (ref 81–99)
OPIATE SCREEN URINE: NEGATIVE
PDW BLD-RTO: 14.1 % (ref 11.5–14.5)
PLATELET # BLD: 248 K/UL (ref 130–400)
PMV BLD AUTO: 12 FL (ref 9.4–12.3)
RBC # BLD: 3.86 M/UL (ref 4.2–5.4)
WBC # BLD: 10.5 K/UL (ref 4.8–10.8)

## 2018-10-29 PROCEDURE — 36415 COLL VENOUS BLD VENIPUNCTURE: CPT

## 2018-10-29 PROCEDURE — 86850 RBC ANTIBODY SCREEN: CPT

## 2018-10-29 PROCEDURE — 80307 DRUG TEST PRSMV CHEM ANLYZR: CPT

## 2018-10-29 PROCEDURE — 6360000002 HC RX W HCPCS: Performed by: OBSTETRICS & GYNECOLOGY

## 2018-10-29 PROCEDURE — 86900 BLOOD TYPING SEROLOGIC ABO: CPT

## 2018-10-29 PROCEDURE — 85027 COMPLETE CBC AUTOMATED: CPT

## 2018-10-29 PROCEDURE — 86592 SYPHILIS TEST NON-TREP QUAL: CPT

## 2018-10-29 PROCEDURE — 86901 BLOOD TYPING SEROLOGIC RH(D): CPT

## 2018-10-29 PROCEDURE — 1220000000 HC SEMI PRIVATE OB R&B

## 2018-10-29 PROCEDURE — 2580000003 HC RX 258: Performed by: OBSTETRICS & GYNECOLOGY

## 2018-10-29 RX ORDER — LIDOCAINE HYDROCHLORIDE 10 MG/ML
30 INJECTION, SOLUTION EPIDURAL; INFILTRATION; INTRACAUDAL; PERINEURAL PRN
Status: DISCONTINUED | OUTPATIENT
Start: 2018-10-29 | End: 2018-11-01 | Stop reason: HOSPADM

## 2018-10-29 RX ORDER — SODIUM CHLORIDE 0.9 % (FLUSH) 0.9 %
10 SYRINGE (ML) INJECTION PRN
Status: DISCONTINUED | OUTPATIENT
Start: 2018-10-29 | End: 2018-11-01 | Stop reason: HOSPADM

## 2018-10-29 RX ORDER — SODIUM CHLORIDE, SODIUM LACTATE, POTASSIUM CHLORIDE, CALCIUM CHLORIDE 600; 310; 30; 20 MG/100ML; MG/100ML; MG/100ML; MG/100ML
INJECTION, SOLUTION INTRAVENOUS CONTINUOUS
Status: DISCONTINUED | OUTPATIENT
Start: 2018-10-29 | End: 2018-11-01 | Stop reason: HOSPADM

## 2018-10-29 RX ORDER — BISACODYL 10 MG
10 SUPPOSITORY, RECTAL RECTAL DAILY PRN
Status: DISCONTINUED | OUTPATIENT
Start: 2018-10-29 | End: 2018-11-01 | Stop reason: HOSPADM

## 2018-10-29 RX ORDER — SODIUM CHLORIDE 0.9 % (FLUSH) 0.9 %
10 SYRINGE (ML) INJECTION EVERY 12 HOURS SCHEDULED
Status: DISCONTINUED | OUTPATIENT
Start: 2018-10-29 | End: 2018-11-01 | Stop reason: HOSPADM

## 2018-10-29 RX ORDER — ONDANSETRON 2 MG/ML
8 INJECTION INTRAMUSCULAR; INTRAVENOUS EVERY 8 HOURS PRN
Status: DISCONTINUED | OUTPATIENT
Start: 2018-10-29 | End: 2018-11-01 | Stop reason: HOSPADM

## 2018-10-29 RX ORDER — DOCUSATE SODIUM 100 MG/1
100 CAPSULE, LIQUID FILLED ORAL 2 TIMES DAILY
Status: DISCONTINUED | OUTPATIENT
Start: 2018-10-29 | End: 2018-11-01 | Stop reason: HOSPADM

## 2018-10-29 RX ORDER — BUTORPHANOL TARTRATE 1 MG/ML
1 INJECTION, SOLUTION INTRAMUSCULAR; INTRAVENOUS
Status: DISCONTINUED | OUTPATIENT
Start: 2018-10-29 | End: 2018-11-01 | Stop reason: HOSPADM

## 2018-10-29 RX ADMIN — Medication 1 MILLI-UNITS/MIN: at 22:43

## 2018-10-29 RX ADMIN — SODIUM CHLORIDE, POTASSIUM CHLORIDE, SODIUM LACTATE AND CALCIUM CHLORIDE: 600; 310; 30; 20 INJECTION, SOLUTION INTRAVENOUS at 21:37

## 2018-10-30 ENCOUNTER — ANESTHESIA EVENT (OUTPATIENT)
Dept: MOTHER INFANT UNIT | Age: 28
End: 2018-10-30
Payer: COMMERCIAL

## 2018-10-30 ENCOUNTER — ANESTHESIA (OUTPATIENT)
Dept: MOTHER INFANT UNIT | Age: 28
End: 2018-10-30
Payer: COMMERCIAL

## 2018-10-30 PROCEDURE — 6370000000 HC RX 637 (ALT 250 FOR IP): Performed by: OBSTETRICS & GYNECOLOGY

## 2018-10-30 PROCEDURE — 7200000001 HC VAGINAL DELIVERY

## 2018-10-30 PROCEDURE — 3700000025 ANESTHESIA EPIDURAL BLOCK: Performed by: NURSE ANESTHETIST, CERTIFIED REGISTERED

## 2018-10-30 PROCEDURE — 2580000003 HC RX 258: Performed by: OBSTETRICS & GYNECOLOGY

## 2018-10-30 PROCEDURE — 1220000000 HC SEMI PRIVATE OB R&B

## 2018-10-30 PROCEDURE — 6360000002 HC RX W HCPCS: Performed by: NURSE ANESTHETIST, CERTIFIED REGISTERED

## 2018-10-30 RX ORDER — SODIUM CHLORIDE, SODIUM LACTATE, POTASSIUM CHLORIDE, AND CALCIUM CHLORIDE .6; .31; .03; .02 G/100ML; G/100ML; G/100ML; G/100ML
1000 INJECTION, SOLUTION INTRAVENOUS ONCE
Status: COMPLETED | OUTPATIENT
Start: 2018-10-30 | End: 2018-10-30

## 2018-10-30 RX ORDER — NALOXONE HYDROCHLORIDE 0.4 MG/ML
0.4 INJECTION, SOLUTION INTRAMUSCULAR; INTRAVENOUS; SUBCUTANEOUS PRN
Status: DISCONTINUED | OUTPATIENT
Start: 2018-10-30 | End: 2018-11-01 | Stop reason: HOSPADM

## 2018-10-30 RX ORDER — ROPIVACAINE HYDROCHLORIDE 2 MG/ML
INJECTION, SOLUTION EPIDURAL; INFILTRATION; PERINEURAL CONTINUOUS PRN
Status: DISCONTINUED | OUTPATIENT
Start: 2018-10-30 | End: 2018-10-30 | Stop reason: SDUPTHER

## 2018-10-30 RX ORDER — ACETAMINOPHEN 325 MG/1
650 TABLET ORAL EVERY 4 HOURS PRN
Status: DISCONTINUED | OUTPATIENT
Start: 2018-10-30 | End: 2018-11-01 | Stop reason: HOSPADM

## 2018-10-30 RX ORDER — OXYCODONE HYDROCHLORIDE AND ACETAMINOPHEN 5; 325 MG/1; MG/1
1 TABLET ORAL EVERY 4 HOURS PRN
Status: DISCONTINUED | OUTPATIENT
Start: 2018-10-30 | End: 2018-11-01 | Stop reason: HOSPADM

## 2018-10-30 RX ORDER — IBUPROFEN 400 MG/1
800 TABLET ORAL EVERY 8 HOURS PRN
Status: DISCONTINUED | OUTPATIENT
Start: 2018-10-30 | End: 2018-11-01 | Stop reason: HOSPADM

## 2018-10-30 RX ORDER — ONDANSETRON 2 MG/ML
4 INJECTION INTRAMUSCULAR; INTRAVENOUS EVERY 6 HOURS PRN
Status: DISCONTINUED | OUTPATIENT
Start: 2018-10-30 | End: 2018-11-01 | Stop reason: HOSPADM

## 2018-10-30 RX ORDER — ROPIVACAINE HYDROCHLORIDE 2 MG/ML
INJECTION, SOLUTION EPIDURAL; INFILTRATION; PERINEURAL PRN
Status: DISCONTINUED | OUTPATIENT
Start: 2018-10-30 | End: 2018-10-30 | Stop reason: SDUPTHER

## 2018-10-30 RX ADMIN — SODIUM CHLORIDE, POTASSIUM CHLORIDE, SODIUM LACTATE AND CALCIUM CHLORIDE 1000 ML: 600; 310; 30; 20 INJECTION, SOLUTION INTRAVENOUS at 09:56

## 2018-10-30 RX ADMIN — IBUPROFEN 800 MG: 400 TABLET ORAL at 20:06

## 2018-10-30 RX ADMIN — ACETAMINOPHEN 650 MG: 325 TABLET ORAL at 01:18

## 2018-10-30 RX ADMIN — ROPIVACAINE HYDROCHLORIDE 20 ML: 2 INJECTION, SOLUTION EPIDURAL; INFILTRATION at 10:56

## 2018-10-30 RX ADMIN — DOCUSATE SODIUM 100 MG: 100 CAPSULE, LIQUID FILLED ORAL at 20:06

## 2018-10-30 RX ADMIN — SODIUM CHLORIDE, POTASSIUM CHLORIDE, SODIUM LACTATE AND CALCIUM CHLORIDE: 600; 310; 30; 20 INJECTION, SOLUTION INTRAVENOUS at 04:07

## 2018-10-30 RX ADMIN — SODIUM CHLORIDE, POTASSIUM CHLORIDE, SODIUM LACTATE AND CALCIUM CHLORIDE: 600; 310; 30; 20 INJECTION, SOLUTION INTRAVENOUS at 12:09

## 2018-10-30 RX ADMIN — OXYCODONE AND ACETAMINOPHEN 1 TABLET: 5; 325 TABLET ORAL at 21:06

## 2018-10-30 RX ADMIN — ROPIVACAINE HYDROCHLORIDE 13 ML/HR: 2 INJECTION, SOLUTION EPIDURAL; INFILTRATION; PERINEURAL at 11:02

## 2018-10-30 ASSESSMENT — LIFESTYLE VARIABLES: SMOKING_STATUS: 0

## 2018-10-30 ASSESSMENT — PAIN SCALES - GENERAL
PAINLEVEL_OUTOF10: 5
PAINLEVEL_OUTOF10: 7
PAINLEVEL_OUTOF10: 1

## 2018-10-31 LAB
BASOPHILS ABSOLUTE: 0 K/UL (ref 0–0.2)
BASOPHILS RELATIVE PERCENT: 0.3 % (ref 0–1)
EOSINOPHILS ABSOLUTE: 0.1 K/UL (ref 0–0.6)
EOSINOPHILS RELATIVE PERCENT: 0.7 % (ref 0–5)
HCT VFR BLD CALC: 31.6 % (ref 37–47)
HEMOGLOBIN: 10.1 G/DL (ref 12–16)
LYMPHOCYTES ABSOLUTE: 1.8 K/UL (ref 1.1–4.5)
LYMPHOCYTES RELATIVE PERCENT: 19.2 % (ref 20–40)
MCH RBC QN AUTO: 28.8 PG (ref 27–31)
MCHC RBC AUTO-ENTMCNC: 32 G/DL (ref 33–37)
MCV RBC AUTO: 90 FL (ref 81–99)
MONOCYTES ABSOLUTE: 0.7 K/UL (ref 0–0.9)
MONOCYTES RELATIVE PERCENT: 7.4 % (ref 0–10)
NEUTROPHILS ABSOLUTE: 6.8 K/UL (ref 1.5–7.5)
NEUTROPHILS RELATIVE PERCENT: 71.7 % (ref 50–65)
PDW BLD-RTO: 14.3 % (ref 11.5–14.5)
PLATELET # BLD: 184 K/UL (ref 130–400)
PMV BLD AUTO: 12.3 FL (ref 9.4–12.3)
RBC # BLD: 3.51 M/UL (ref 4.2–5.4)
WBC # BLD: 9.6 K/UL (ref 4.8–10.8)

## 2018-10-31 PROCEDURE — 1220000000 HC SEMI PRIVATE OB R&B

## 2018-10-31 PROCEDURE — 36415 COLL VENOUS BLD VENIPUNCTURE: CPT

## 2018-10-31 PROCEDURE — 85025 COMPLETE CBC W/AUTO DIFF WBC: CPT

## 2018-10-31 PROCEDURE — 99999 PR OFFICE/OUTPT VISIT,PROCEDURE ONLY: CPT | Performed by: NURSE PRACTITIONER

## 2018-10-31 PROCEDURE — 6370000000 HC RX 637 (ALT 250 FOR IP): Performed by: OBSTETRICS & GYNECOLOGY

## 2018-10-31 RX ADMIN — OXYCODONE AND ACETAMINOPHEN 1 TABLET: 5; 325 TABLET ORAL at 15:52

## 2018-10-31 RX ADMIN — DOCUSATE SODIUM 100 MG: 100 CAPSULE, LIQUID FILLED ORAL at 19:35

## 2018-10-31 RX ADMIN — IBUPROFEN 800 MG: 400 TABLET ORAL at 05:47

## 2018-10-31 RX ADMIN — DOCUSATE SODIUM 100 MG: 100 CAPSULE, LIQUID FILLED ORAL at 08:46

## 2018-10-31 RX ADMIN — IBUPROFEN 800 MG: 400 TABLET ORAL at 19:35

## 2018-10-31 ASSESSMENT — PAIN SCALES - GENERAL
PAINLEVEL_OUTOF10: 6
PAINLEVEL_OUTOF10: 7
PAINLEVEL_OUTOF10: 6

## 2018-10-31 NOTE — PROGRESS NOTES
Subjective:     Postpartum Day 1: Vaginal Delivery    Patient is a [id number] The patient feels well. The patient denies emotional concerns. Pain is well controlled with current medications. The baby is well. Baby is feeding via breast. Urinary output is adequate. The patient is ambulating well. The patient is tolerating a normal diet. Flatus has been passed. Objective:      Patient Vitals for the past 8 hrs:   BP Temp Temp src Pulse Resp SpO2   10/31/18 0552 (!) 140/86 97.6 °F (36.4 °C) Temporal 72 16 98 %   10/31/18 0209 139/85 97.5 °F (36.4 °C) Temporal 70 14 97 %     General:    alert, appears stated age and cooperative   Bowel Sounds:  active   Lochia:  appropriate   Uterine Fundus:   firm   Episiotomy:  healing well, no significant drainage, no dehiscence, no significant erythema   DVT Evaluation:  No evidence of DVT seen on physical exam.     Lab Results   Component Value Date    WBC 9.6 10/31/2018    HGB 10.1 (L) 10/31/2018    HCT 31.6 (L) 10/31/2018    MCV 90.0 10/31/2018     10/31/2018     Assessment:     Status post vaginal delivery. Doing well postaprtum. Plan:     Continue current care.

## 2018-11-01 VITALS
DIASTOLIC BLOOD PRESSURE: 91 MMHG | RESPIRATION RATE: 16 BRPM | BODY MASS INDEX: 43.4 KG/M2 | SYSTOLIC BLOOD PRESSURE: 138 MMHG | OXYGEN SATURATION: 97 % | WEIGHT: 293 LBS | HEIGHT: 69 IN | HEART RATE: 81 BPM | TEMPERATURE: 97 F

## 2018-11-01 LAB — RPR: NORMAL

## 2018-11-01 PROCEDURE — 6370000000 HC RX 637 (ALT 250 FOR IP): Performed by: OBSTETRICS & GYNECOLOGY

## 2018-11-01 RX ORDER — IBUPROFEN 800 MG/1
800 TABLET ORAL EVERY 8 HOURS PRN
Qty: 90 TABLET | Refills: 0 | Status: SHIPPED | OUTPATIENT
Start: 2018-11-01 | End: 2019-09-09 | Stop reason: ALTCHOICE

## 2018-11-01 RX ADMIN — IBUPROFEN 800 MG: 400 TABLET ORAL at 04:51

## 2018-11-01 ASSESSMENT — PAIN SCALES - GENERAL: PAINLEVEL_OUTOF10: 8

## 2018-11-26 ENCOUNTER — POSTPARTUM VISIT (OUTPATIENT)
Dept: OBGYN | Age: 28
End: 2018-11-26

## 2018-11-26 VITALS
WEIGHT: 293 LBS | HEIGHT: 69 IN | BODY MASS INDEX: 43.4 KG/M2 | HEART RATE: 73 BPM | DIASTOLIC BLOOD PRESSURE: 85 MMHG | SYSTOLIC BLOOD PRESSURE: 121 MMHG | TEMPERATURE: 98.6 F

## 2018-11-26 DIAGNOSIS — Z30.019 ENCOUNTER FOR INITIAL PRESCRIPTION OF CONTRACEPTIVES, UNSPECIFIED CONTRACEPTIVE: ICD-10-CM

## 2018-11-26 PROCEDURE — 0503F POSTPARTUM CARE VISIT: CPT | Performed by: NURSE PRACTITIONER

## 2018-11-26 ASSESSMENT — ENCOUNTER SYMPTOMS
RESPIRATORY NEGATIVE: 1
GASTROINTESTINAL NEGATIVE: 1
EYES NEGATIVE: 1

## 2019-01-08 ENCOUNTER — NURSE ONLY (OUTPATIENT)
Dept: PRIMARY CARE CLINIC | Age: 29
End: 2019-01-08
Payer: COMMERCIAL

## 2019-01-08 DIAGNOSIS — R35.0 URINARY FREQUENCY: Primary | ICD-10-CM

## 2019-01-08 DIAGNOSIS — R82.998 LEUKOCYTES IN URINE: ICD-10-CM

## 2019-01-08 DIAGNOSIS — R30.9 PAIN WITH URINATION: ICD-10-CM

## 2019-01-08 DIAGNOSIS — R30.0 BURNING WITH URINATION: ICD-10-CM

## 2019-01-08 LAB
BILIRUBIN, POC: ABNORMAL
BLOOD URINE, POC: ABNORMAL
CLARITY, POC: ABNORMAL
COLOR, POC: YELLOW
GLUCOSE URINE, POC: ABNORMAL
KETONES, POC: ABNORMAL
LEUKOCYTE EST, POC: ABNORMAL
NITRITE, POC: POSITIVE
PH, POC: 6
PROTEIN, POC: ABNORMAL
SPECIFIC GRAVITY, POC: 1020
UROBILINOGEN, POC: 0.2

## 2019-01-08 PROCEDURE — 81002 URINALYSIS NONAUTO W/O SCOPE: CPT | Performed by: NURSE PRACTITIONER

## 2019-01-08 RX ORDER — CIPROFLOXACIN 250 MG/1
250 TABLET, FILM COATED ORAL 2 TIMES DAILY
Qty: 14 TABLET | Refills: 0 | Status: SHIPPED | OUTPATIENT
Start: 2019-01-08 | End: 2019-01-15

## 2019-01-10 LAB
ORGANISM: ABNORMAL
URINE CULTURE, ROUTINE: ABNORMAL
URINE CULTURE, ROUTINE: ABNORMAL

## 2019-02-07 RX ORDER — FLUOXETINE HYDROCHLORIDE 20 MG/1
20 CAPSULE ORAL DAILY
Qty: 30 CAPSULE | Refills: 11 | Status: SHIPPED
Start: 2019-02-07 | End: 2019-07-15 | Stop reason: SDUPTHER

## 2019-02-07 RX ORDER — OSELTAMIVIR PHOSPHATE 75 MG/1
75 CAPSULE ORAL DAILY
Qty: 10 CAPSULE | Refills: 0 | Status: SHIPPED | OUTPATIENT
Start: 2019-02-07 | End: 2019-02-17

## 2019-02-17 ENCOUNTER — HOSPITAL ENCOUNTER (EMERGENCY)
Age: 29
Discharge: HOME | End: 2019-02-17
Attending: EMERGENCY MEDICINE
Payer: COMMERCIAL

## 2019-02-17 ENCOUNTER — APPOINTMENT (OUTPATIENT)
Dept: CT IMAGING | Age: 29
End: 2019-02-17
Payer: COMMERCIAL

## 2019-02-17 VITALS
HEIGHT: 69 IN | HEART RATE: 60 BPM | RESPIRATION RATE: 20 BRPM | TEMPERATURE: 98 F | BODY MASS INDEX: 43.4 KG/M2 | SYSTOLIC BLOOD PRESSURE: 132 MMHG | OXYGEN SATURATION: 96 % | DIASTOLIC BLOOD PRESSURE: 82 MMHG | WEIGHT: 293 LBS

## 2019-02-17 DIAGNOSIS — R10.13 ABDOMINAL PAIN, EPIGASTRIC: Primary | ICD-10-CM

## 2019-02-17 DIAGNOSIS — R11.2 NON-INTRACTABLE VOMITING WITH NAUSEA, UNSPECIFIED VOMITING TYPE: ICD-10-CM

## 2019-02-17 LAB
ALBUMIN SERPL-MCNC: 4.7 G/DL (ref 3.5–5.2)
ALP BLD-CCNC: 84 U/L (ref 35–104)
ALT SERPL-CCNC: 29 U/L (ref 5–33)
ANION GAP SERPL CALCULATED.3IONS-SCNC: 12 MMOL/L (ref 7–19)
AST SERPL-CCNC: 18 U/L (ref 5–32)
BACTERIA: NEGATIVE /HPF
BASOPHILS ABSOLUTE: 0 K/UL (ref 0–0.2)
BASOPHILS RELATIVE PERCENT: 0.3 % (ref 0–1)
BILIRUB SERPL-MCNC: 0.3 MG/DL (ref 0.2–1.2)
BILIRUBIN URINE: NEGATIVE
BLOOD, URINE: NEGATIVE
BUN BLDV-MCNC: 16 MG/DL (ref 6–20)
CALCIUM SERPL-MCNC: 9.4 MG/DL (ref 8.6–10)
CHLORIDE BLD-SCNC: 104 MMOL/L (ref 98–111)
CLARITY: CLEAR
CO2: 26 MMOL/L (ref 22–29)
COLOR: YELLOW
CREAT SERPL-MCNC: 0.9 MG/DL (ref 0.5–0.9)
EOSINOPHILS ABSOLUTE: 0.2 K/UL (ref 0–0.6)
EOSINOPHILS RELATIVE PERCENT: 1.9 % (ref 0–5)
EPITHELIAL CELLS, UA: 4 /HPF (ref 0–5)
GFR NON-AFRICAN AMERICAN: >60
GLUCOSE BLD-MCNC: 100 MG/DL (ref 74–109)
GLUCOSE URINE: NEGATIVE MG/DL
HCG QUALITATIVE: NEGATIVE
HCT VFR BLD CALC: 39.3 % (ref 37–47)
HEMOGLOBIN: 12.8 G/DL (ref 12–16)
HYALINE CASTS: 0 /HPF (ref 0–8)
KETONES, URINE: NEGATIVE MG/DL
LEUKOCYTE ESTERASE, URINE: ABNORMAL
LIPASE: 29 U/L (ref 13–60)
LYMPHOCYTES ABSOLUTE: 2.7 K/UL (ref 1.1–4.5)
LYMPHOCYTES RELATIVE PERCENT: 30.4 % (ref 20–40)
MCH RBC QN AUTO: 28.4 PG (ref 27–31)
MCHC RBC AUTO-ENTMCNC: 32.6 G/DL (ref 33–37)
MCV RBC AUTO: 87.1 FL (ref 81–99)
MONOCYTES ABSOLUTE: 0.7 K/UL (ref 0–0.9)
MONOCYTES RELATIVE PERCENT: 7.4 % (ref 0–10)
NEUTROPHILS ABSOLUTE: 5.3 K/UL (ref 1.5–7.5)
NEUTROPHILS RELATIVE PERCENT: 59.7 % (ref 50–65)
NITRITE, URINE: NEGATIVE
PDW BLD-RTO: 13.2 % (ref 11.5–14.5)
PH UA: 6
PLATELET # BLD: 308 K/UL (ref 130–400)
PMV BLD AUTO: 11 FL (ref 9.4–12.3)
POTASSIUM SERPL-SCNC: 4.1 MMOL/L (ref 3.5–5)
PROTEIN UA: NEGATIVE MG/DL
RBC # BLD: 4.51 M/UL (ref 4.2–5.4)
RBC UA: 3 /HPF (ref 0–4)
SODIUM BLD-SCNC: 142 MMOL/L (ref 136–145)
SPECIFIC GRAVITY UA: >1.045
TOTAL PROTEIN: 7.4 G/DL (ref 6.6–8.7)
URINE REFLEX TO CULTURE: YES
UROBILINOGEN, URINE: 0.2 E.U./DL
WBC # BLD: 8.8 K/UL (ref 4.8–10.8)
WBC UA: 6 /HPF (ref 0–5)

## 2019-02-17 PROCEDURE — 36415 COLL VENOUS BLD VENIPUNCTURE: CPT

## 2019-02-17 PROCEDURE — 80053 COMPREHEN METABOLIC PANEL: CPT

## 2019-02-17 PROCEDURE — 2580000003 HC RX 258: Performed by: EMERGENCY MEDICINE

## 2019-02-17 PROCEDURE — 99284 EMERGENCY DEPT VISIT MOD MDM: CPT

## 2019-02-17 PROCEDURE — 81001 URINALYSIS AUTO W/SCOPE: CPT

## 2019-02-17 PROCEDURE — 96374 THER/PROPH/DIAG INJ IV PUSH: CPT

## 2019-02-17 PROCEDURE — 99284 EMERGENCY DEPT VISIT MOD MDM: CPT | Performed by: EMERGENCY MEDICINE

## 2019-02-17 PROCEDURE — 6360000004 HC RX CONTRAST MEDICATION: Performed by: EMERGENCY MEDICINE

## 2019-02-17 PROCEDURE — 96375 TX/PRO/DX INJ NEW DRUG ADDON: CPT

## 2019-02-17 PROCEDURE — 6360000002 HC RX W HCPCS: Performed by: EMERGENCY MEDICINE

## 2019-02-17 PROCEDURE — 87086 URINE CULTURE/COLONY COUNT: CPT

## 2019-02-17 PROCEDURE — 83690 ASSAY OF LIPASE: CPT

## 2019-02-17 PROCEDURE — 84703 CHORIONIC GONADOTROPIN ASSAY: CPT

## 2019-02-17 PROCEDURE — 85025 COMPLETE CBC W/AUTO DIFF WBC: CPT

## 2019-02-17 PROCEDURE — 74177 CT ABD & PELVIS W/CONTRAST: CPT

## 2019-02-17 RX ORDER — 0.9 % SODIUM CHLORIDE 0.9 %
1000 INTRAVENOUS SOLUTION INTRAVENOUS ONCE
Status: COMPLETED | OUTPATIENT
Start: 2019-02-17 | End: 2019-02-17

## 2019-02-17 RX ORDER — MORPHINE SULFATE 2 MG/ML
4 INJECTION, SOLUTION INTRAMUSCULAR; INTRAVENOUS ONCE
Status: COMPLETED | OUTPATIENT
Start: 2019-02-17 | End: 2019-02-17

## 2019-02-17 RX ORDER — ONDANSETRON 2 MG/ML
4 INJECTION INTRAMUSCULAR; INTRAVENOUS ONCE
Status: COMPLETED | OUTPATIENT
Start: 2019-02-17 | End: 2019-02-17

## 2019-02-17 RX ORDER — PROMETHAZINE HYDROCHLORIDE 25 MG/ML
12.5 INJECTION, SOLUTION INTRAMUSCULAR; INTRAVENOUS ONCE
Status: COMPLETED | OUTPATIENT
Start: 2019-02-17 | End: 2019-02-17

## 2019-02-17 RX ORDER — ONDANSETRON 4 MG/1
4 TABLET, ORALLY DISINTEGRATING ORAL EVERY 8 HOURS PRN
Qty: 15 TABLET | Refills: 0 | Status: SHIPPED | OUTPATIENT
Start: 2019-02-17 | End: 2019-09-09 | Stop reason: ALTCHOICE

## 2019-02-17 RX ORDER — FAMOTIDINE 20 MG/1
20 TABLET, FILM COATED ORAL 2 TIMES DAILY
Qty: 60 TABLET | Refills: 0 | Status: SHIPPED | OUTPATIENT
Start: 2019-02-17 | End: 2019-09-09 | Stop reason: ALTCHOICE

## 2019-02-17 RX ORDER — HYDROCODONE BITARTRATE AND ACETAMINOPHEN 5; 325 MG/1; MG/1
1 TABLET ORAL EVERY 6 HOURS PRN
Qty: 12 TABLET | Refills: 0 | Status: SHIPPED | OUTPATIENT
Start: 2019-02-17 | End: 2019-02-20

## 2019-02-17 RX ADMIN — ONDANSETRON 4 MG: 2 INJECTION INTRAMUSCULAR; INTRAVENOUS at 21:08

## 2019-02-17 RX ADMIN — PROMETHAZINE HYDROCHLORIDE 12.5 MG: 25 INJECTION INTRAMUSCULAR; INTRAVENOUS at 22:15

## 2019-02-17 RX ADMIN — IOPAMIDOL 95 ML: 755 INJECTION, SOLUTION INTRAVENOUS at 21:26

## 2019-02-17 RX ADMIN — HYDROMORPHONE HYDROCHLORIDE 1 MG: 1 INJECTION, SOLUTION INTRAMUSCULAR; INTRAVENOUS; SUBCUTANEOUS at 22:15

## 2019-02-17 RX ADMIN — SODIUM CHLORIDE 1000 ML: 9 INJECTION, SOLUTION INTRAVENOUS at 21:08

## 2019-02-17 RX ADMIN — MORPHINE SULFATE 4 MG: 2 INJECTION, SOLUTION INTRAMUSCULAR; INTRAVENOUS at 21:08

## 2019-02-17 ASSESSMENT — PAIN SCALES - GENERAL
PAINLEVEL_OUTOF10: 10

## 2019-02-17 ASSESSMENT — PAIN DESCRIPTION - ORIENTATION: ORIENTATION: UPPER

## 2019-02-17 ASSESSMENT — ENCOUNTER SYMPTOMS
SORE THROAT: 0
VOMITING: 1
NAUSEA: 1
BACK PAIN: 0
SHORTNESS OF BREATH: 0
ABDOMINAL PAIN: 1
DIARRHEA: 0
RHINORRHEA: 0

## 2019-02-17 ASSESSMENT — PAIN DESCRIPTION - DESCRIPTORS: DESCRIPTORS: STABBING

## 2019-02-17 ASSESSMENT — PAIN DESCRIPTION - LOCATION: LOCATION: ABDOMEN

## 2019-02-18 ENCOUNTER — OFFICE VISIT (OUTPATIENT)
Dept: PRIMARY CARE CLINIC | Age: 29
End: 2019-02-18
Payer: COMMERCIAL

## 2019-02-18 ENCOUNTER — TRANSCRIBE ORDERS (OUTPATIENT)
Dept: ADMINISTRATIVE | Facility: HOSPITAL | Age: 29
End: 2019-02-18

## 2019-02-18 VITALS
TEMPERATURE: 97.7 F | RESPIRATION RATE: 16 BRPM | HEIGHT: 69 IN | SYSTOLIC BLOOD PRESSURE: 128 MMHG | WEIGHT: 293 LBS | HEART RATE: 84 BPM | BODY MASS INDEX: 43.4 KG/M2 | DIASTOLIC BLOOD PRESSURE: 75 MMHG

## 2019-02-18 DIAGNOSIS — R11.2 INTRACTABLE VOMITING WITH NAUSEA, UNSPECIFIED VOMITING TYPE: ICD-10-CM

## 2019-02-18 DIAGNOSIS — R10.11 ABDOMINAL PAIN, RIGHT UPPER QUADRANT: Primary | ICD-10-CM

## 2019-02-18 DIAGNOSIS — R10.11 RUQ PAIN: Primary | ICD-10-CM

## 2019-02-18 DIAGNOSIS — R11.2 NAUSEA AND VOMITING, INTRACTABILITY OF VOMITING NOT SPECIFIED, UNSPECIFIED VOMITING TYPE: ICD-10-CM

## 2019-02-18 PROCEDURE — G8417 CALC BMI ABV UP PARAM F/U: HCPCS | Performed by: NURSE PRACTITIONER

## 2019-02-18 PROCEDURE — G8484 FLU IMMUNIZE NO ADMIN: HCPCS | Performed by: NURSE PRACTITIONER

## 2019-02-18 PROCEDURE — 1036F TOBACCO NON-USER: CPT | Performed by: NURSE PRACTITIONER

## 2019-02-18 PROCEDURE — G8427 DOCREV CUR MEDS BY ELIG CLIN: HCPCS | Performed by: NURSE PRACTITIONER

## 2019-02-18 PROCEDURE — 99213 OFFICE O/P EST LOW 20 MIN: CPT | Performed by: NURSE PRACTITIONER

## 2019-02-18 RX ORDER — OXYCODONE HYDROCHLORIDE AND ACETAMINOPHEN 5; 325 MG/1; MG/1
1 TABLET ORAL EVERY 6 HOURS PRN
Qty: 12 TABLET | Refills: 0 | Status: SHIPPED | OUTPATIENT
Start: 2019-02-18 | End: 2019-02-21

## 2019-02-18 RX ORDER — PROMETHAZINE HYDROCHLORIDE 25 MG/1
25 TABLET ORAL EVERY 6 HOURS PRN
Qty: 20 TABLET | Refills: 0 | Status: SHIPPED | OUTPATIENT
Start: 2019-02-18 | End: 2019-09-09 | Stop reason: ALTCHOICE

## 2019-02-18 ASSESSMENT — ENCOUNTER SYMPTOMS
ABDOMINAL PAIN: 1
CONSTIPATION: 0
RECTAL PAIN: 0
ABDOMINAL DISTENTION: 0
NAUSEA: 1
BLOOD IN STOOL: 0
DIARRHEA: 0
ANAL BLEEDING: 0
VOMITING: 1

## 2019-02-20 ENCOUNTER — HOSPITAL ENCOUNTER (OUTPATIENT)
Dept: NUCLEAR MEDICINE | Facility: HOSPITAL | Age: 29
Discharge: HOME | End: 2019-02-20

## 2019-02-20 ENCOUNTER — TRANSCRIBE ORDERS (OUTPATIENT)
Dept: ADMINISTRATIVE | Facility: HOSPITAL | Age: 29
End: 2019-02-20

## 2019-02-20 DIAGNOSIS — R10.11 RIGHT UPPER QUADRANT ABDOMINAL PAIN: Primary | ICD-10-CM

## 2019-02-20 DIAGNOSIS — R10.11 ABDOMINAL PAIN, RIGHT UPPER QUADRANT: Primary | ICD-10-CM

## 2019-02-20 DIAGNOSIS — R11.2 INTRACTABLE VOMITING WITH NAUSEA, UNSPECIFIED VOMITING TYPE: ICD-10-CM

## 2019-02-20 DIAGNOSIS — R10.11 ABDOMINAL PAIN, RIGHT UPPER QUADRANT: ICD-10-CM

## 2019-02-20 LAB — URINE CULTURE, ROUTINE: NORMAL

## 2019-02-20 PROCEDURE — 99284 EMERGENCY DEPT VISIT MOD MDM: CPT

## 2019-02-21 ENCOUNTER — ANESTHESIA EVENT (OUTPATIENT)
Dept: PERIOP | Facility: HOSPITAL | Age: 29
End: 2019-02-21

## 2019-02-21 ENCOUNTER — HOSPITAL ENCOUNTER (OUTPATIENT)
Facility: HOSPITAL | Age: 29
Setting detail: OBSERVATION
Discharge: HOME | End: 2019-02-23
Attending: EMERGENCY MEDICINE | Admitting: SPECIALIST

## 2019-02-21 ENCOUNTER — APPOINTMENT (OUTPATIENT)
Dept: ULTRASOUND IMAGING | Facility: HOSPITAL | Age: 29
End: 2019-02-21

## 2019-02-21 ENCOUNTER — ANESTHESIA (OUTPATIENT)
Dept: PERIOP | Facility: HOSPITAL | Age: 29
End: 2019-02-21

## 2019-02-21 ENCOUNTER — HOSPITAL ENCOUNTER (OUTPATIENT)
Dept: NUCLEAR MEDICINE | Facility: HOSPITAL | Age: 29
End: 2019-02-21

## 2019-02-21 ENCOUNTER — APPOINTMENT (OUTPATIENT)
Dept: GENERAL RADIOLOGY | Facility: HOSPITAL | Age: 29
End: 2019-02-21

## 2019-02-21 DIAGNOSIS — K80.20 CALCULUS OF GALLBLADDER WITHOUT CHOLECYSTITIS WITHOUT OBSTRUCTION: Primary | ICD-10-CM

## 2019-02-21 DIAGNOSIS — N39.0 URINARY TRACT INFECTION WITHOUT HEMATURIA, SITE UNSPECIFIED: ICD-10-CM

## 2019-02-21 DIAGNOSIS — K81.9 CHOLECYSTITIS: ICD-10-CM

## 2019-02-21 LAB
ALBUMIN SERPL-MCNC: 4.2 G/DL (ref 3.5–5)
ALBUMIN SERPL-MCNC: 4.4 G/DL (ref 3.5–5)
ALBUMIN/GLOB SERPL: 1.6 G/DL (ref 1.1–2.5)
ALBUMIN/GLOB SERPL: 1.7 G/DL (ref 1.1–2.5)
ALP SERPL-CCNC: 73 U/L (ref 24–120)
ALP SERPL-CCNC: 76 U/L (ref 24–120)
ALT SERPL W P-5'-P-CCNC: 40 U/L (ref 0–54)
ALT SERPL W P-5'-P-CCNC: 45 U/L (ref 0–54)
ANION GAP SERPL CALCULATED.3IONS-SCNC: 7 MMOL/L (ref 4–13)
ANION GAP SERPL CALCULATED.3IONS-SCNC: 7 MMOL/L (ref 4–13)
APTT PPP: 30.6 SECONDS (ref 24.1–34.8)
AST SERPL-CCNC: 30 U/L (ref 7–45)
AST SERPL-CCNC: 40 U/L (ref 7–45)
B-HCG UR QL: NEGATIVE
BACTERIA UR QL AUTO: ABNORMAL /HPF
BASOPHILS # BLD AUTO: 0.03 10*3/MM3 (ref 0–0.2)
BASOPHILS NFR BLD AUTO: 0.5 % (ref 0–2)
BILIRUB SERPL-MCNC: 0.6 MG/DL (ref 0.1–1)
BILIRUB SERPL-MCNC: 0.6 MG/DL (ref 0.1–1)
BILIRUB UR QL STRIP: NEGATIVE
BUN BLD-MCNC: 14 MG/DL (ref 5–21)
BUN BLD-MCNC: 15 MG/DL (ref 5–21)
BUN/CREAT SERPL: 18.5 (ref 7–25)
BUN/CREAT SERPL: 18.9 (ref 7–25)
CALCIUM SPEC-SCNC: 8.9 MG/DL (ref 8.4–10.4)
CALCIUM SPEC-SCNC: 9.2 MG/DL (ref 8.4–10.4)
CHLORIDE SERPL-SCNC: 104 MMOL/L (ref 98–110)
CHLORIDE SERPL-SCNC: 105 MMOL/L (ref 98–110)
CLARITY UR: CLEAR
CO2 SERPL-SCNC: 28 MMOL/L (ref 24–31)
CO2 SERPL-SCNC: 28 MMOL/L (ref 24–31)
COLOR UR: YELLOW
CREAT BLD-MCNC: 0.74 MG/DL (ref 0.5–1.4)
CREAT BLD-MCNC: 0.81 MG/DL (ref 0.5–1.4)
DEPRECATED RDW RBC AUTO: 39.3 FL (ref 40–54)
DEPRECATED RDW RBC AUTO: 39.5 FL (ref 40–54)
EOSINOPHIL # BLD AUTO: 0.15 10*3/MM3 (ref 0–0.7)
EOSINOPHIL NFR BLD AUTO: 2.3 % (ref 0–4)
ERYTHROCYTE [DISTWIDTH] IN BLOOD BY AUTOMATED COUNT: 13.1 % (ref 12–15)
ERYTHROCYTE [DISTWIDTH] IN BLOOD BY AUTOMATED COUNT: 13.2 % (ref 12–15)
GFR SERPL CREATININE-BSD FRML MDRD: 85 ML/MIN/1.73
GFR SERPL CREATININE-BSD FRML MDRD: 94 ML/MIN/1.73
GLOBULIN UR ELPH-MCNC: 2.5 GM/DL
GLOBULIN UR ELPH-MCNC: 2.7 GM/DL
GLUCOSE BLD-MCNC: 85 MG/DL (ref 70–100)
GLUCOSE BLD-MCNC: 96 MG/DL (ref 70–100)
GLUCOSE UR STRIP-MCNC: NEGATIVE MG/DL
HCT VFR BLD AUTO: 34.8 % (ref 37–47)
HCT VFR BLD AUTO: 35.6 % (ref 37–47)
HGB BLD-MCNC: 11.9 G/DL (ref 12–16)
HGB BLD-MCNC: 12.4 G/DL (ref 12–16)
HGB UR QL STRIP.AUTO: NEGATIVE
HYALINE CASTS UR QL AUTO: ABNORMAL /LPF
IMM GRANULOCYTES # BLD AUTO: 0.03 10*3/MM3 (ref 0–0.05)
IMM GRANULOCYTES NFR BLD AUTO: 0.5 % (ref 0–5)
INR PPP: 1.05 (ref 0.91–1.09)
INTERNAL NEGATIVE CONTROL: NEGATIVE
INTERNAL POSITIVE CONTROL: POSITIVE
KETONES UR QL STRIP: ABNORMAL
LEUKOCYTE ESTERASE UR QL STRIP.AUTO: ABNORMAL
LIPASE SERPL-CCNC: 72 U/L (ref 23–203)
LYMPHOCYTES # BLD AUTO: 2.36 10*3/MM3 (ref 0.72–4.86)
LYMPHOCYTES NFR BLD AUTO: 36.4 % (ref 15–45)
Lab: NORMAL
MCH RBC QN AUTO: 28.5 PG (ref 28–32)
MCH RBC QN AUTO: 28.9 PG (ref 28–32)
MCHC RBC AUTO-ENTMCNC: 34.2 G/DL (ref 33–36)
MCHC RBC AUTO-ENTMCNC: 34.8 G/DL (ref 33–36)
MCV RBC AUTO: 83 FL (ref 82–98)
MCV RBC AUTO: 83.3 FL (ref 82–98)
MONOCYTES # BLD AUTO: 0.55 10*3/MM3 (ref 0.19–1.3)
MONOCYTES NFR BLD AUTO: 8.5 % (ref 4–12)
NEUTROPHILS # BLD AUTO: 3.36 10*3/MM3 (ref 1.87–8.4)
NEUTROPHILS NFR BLD AUTO: 51.8 % (ref 39–78)
NITRITE UR QL STRIP: NEGATIVE
NRBC BLD AUTO-RTO: 0 /100 WBC (ref 0–0)
PH UR STRIP.AUTO: 5.5 [PH] (ref 5–8)
PLATELET # BLD AUTO: 262 10*3/MM3 (ref 130–400)
PLATELET # BLD AUTO: 296 10*3/MM3 (ref 130–400)
PMV BLD AUTO: 11 FL (ref 6–12)
PMV BLD AUTO: 11 FL (ref 6–12)
POTASSIUM BLD-SCNC: 3.9 MMOL/L (ref 3.5–5.3)
POTASSIUM BLD-SCNC: 4 MMOL/L (ref 3.5–5.3)
PROT SERPL-MCNC: 6.7 G/DL (ref 6.3–8.7)
PROT SERPL-MCNC: 7.1 G/DL (ref 6.3–8.7)
PROT UR QL STRIP: NEGATIVE
PROTHROMBIN TIME: 14 SECONDS (ref 11.9–14.6)
RBC # BLD AUTO: 4.18 10*6/MM3 (ref 4.2–5.4)
RBC # BLD AUTO: 4.29 10*6/MM3 (ref 4.2–5.4)
RBC # UR: ABNORMAL /HPF
REF LAB TEST METHOD: ABNORMAL
SODIUM BLD-SCNC: 139 MMOL/L (ref 135–145)
SODIUM BLD-SCNC: 140 MMOL/L (ref 135–145)
SP GR UR STRIP: 1.03 (ref 1–1.03)
SQUAMOUS #/AREA URNS HPF: ABNORMAL /HPF
UROBILINOGEN UR QL STRIP: ABNORMAL
WBC NRBC COR # BLD: 5.22 10*3/MM3 (ref 4.8–10.8)
WBC NRBC COR # BLD: 6.48 10*3/MM3 (ref 4.8–10.8)
WBC UR QL AUTO: ABNORMAL /HPF

## 2019-02-21 PROCEDURE — 25010000002 HYDROMORPHONE PER 4 MG: Performed by: NURSE ANESTHETIST, CERTIFIED REGISTERED

## 2019-02-21 PROCEDURE — 85025 COMPLETE CBC W/AUTO DIFF WBC: CPT | Performed by: EMERGENCY MEDICINE

## 2019-02-21 PROCEDURE — G0378 HOSPITAL OBSERVATION PER HR: HCPCS

## 2019-02-21 PROCEDURE — 81001 URINALYSIS AUTO W/SCOPE: CPT | Performed by: EMERGENCY MEDICINE

## 2019-02-21 PROCEDURE — 25010000002 FENTANYL CITRATE (PF) 100 MCG/2ML SOLUTION: Performed by: NURSE ANESTHETIST, CERTIFIED REGISTERED

## 2019-02-21 PROCEDURE — 96375 TX/PRO/DX INJ NEW DRUG ADDON: CPT

## 2019-02-21 PROCEDURE — 25010000002 DEXAMETHASONE PER 1 MG: Performed by: NURSE ANESTHETIST, CERTIFIED REGISTERED

## 2019-02-21 PROCEDURE — 25010000002 ONDANSETRON PER 1 MG: Performed by: EMERGENCY MEDICINE

## 2019-02-21 PROCEDURE — 25010000002 IOPAMIDOL 61 % SOLUTION: Performed by: SPECIALIST

## 2019-02-21 PROCEDURE — 96376 TX/PRO/DX INJ SAME DRUG ADON: CPT

## 2019-02-21 PROCEDURE — 25010000002 PROMETHAZINE PER 50 MG: Performed by: SPECIALIST

## 2019-02-21 PROCEDURE — 85610 PROTHROMBIN TIME: CPT | Performed by: EMERGENCY MEDICINE

## 2019-02-21 PROCEDURE — 94799 UNLISTED PULMONARY SVC/PX: CPT

## 2019-02-21 PROCEDURE — 25010000002 CEFOXITIN PER 1 G: Performed by: SPECIALIST

## 2019-02-21 PROCEDURE — 25010000002 ONDANSETRON PER 1 MG: Performed by: ANESTHESIOLOGY

## 2019-02-21 PROCEDURE — 25010000002 PROPOFOL 10 MG/ML EMULSION: Performed by: NURSE ANESTHETIST, CERTIFIED REGISTERED

## 2019-02-21 PROCEDURE — 25010000002 MORPHINE PER 10 MG: Performed by: SPECIALIST

## 2019-02-21 PROCEDURE — 25010000002 MORPHINE PER 10 MG: Performed by: EMERGENCY MEDICINE

## 2019-02-21 PROCEDURE — 25010000002 FENTANYL CITRATE (PF) 100 MCG/2ML SOLUTION: Performed by: ANESTHESIOLOGY

## 2019-02-21 PROCEDURE — 85730 THROMBOPLASTIN TIME PARTIAL: CPT | Performed by: EMERGENCY MEDICINE

## 2019-02-21 PROCEDURE — 94760 N-INVAS EAR/PLS OXIMETRY 1: CPT

## 2019-02-21 PROCEDURE — 25010000002 ONDANSETRON PER 1 MG: Performed by: NURSE ANESTHETIST, CERTIFIED REGISTERED

## 2019-02-21 PROCEDURE — 74300 X-RAY BILE DUCTS/PANCREAS: CPT

## 2019-02-21 PROCEDURE — 25010000002 PROMETHAZINE PER 50 MG: Performed by: NURSE ANESTHETIST, CERTIFIED REGISTERED

## 2019-02-21 PROCEDURE — 96361 HYDRATE IV INFUSION ADD-ON: CPT

## 2019-02-21 PROCEDURE — 36415 COLL VENOUS BLD VENIPUNCTURE: CPT | Performed by: EMERGENCY MEDICINE

## 2019-02-21 PROCEDURE — 81025 URINE PREGNANCY TEST: CPT | Performed by: EMERGENCY MEDICINE

## 2019-02-21 PROCEDURE — 25010000002 NEOSTIGMINE PER 0.5 MG: Performed by: NURSE ANESTHETIST, CERTIFIED REGISTERED

## 2019-02-21 PROCEDURE — 25010000002 MIDAZOLAM PER 1 MG: Performed by: ANESTHESIOLOGY

## 2019-02-21 PROCEDURE — 25010000002 CEFOXITIN PER 1 G: Performed by: NURSE ANESTHETIST, CERTIFIED REGISTERED

## 2019-02-21 PROCEDURE — 83690 ASSAY OF LIPASE: CPT | Performed by: EMERGENCY MEDICINE

## 2019-02-21 PROCEDURE — 96365 THER/PROPH/DIAG IV INF INIT: CPT

## 2019-02-21 PROCEDURE — 80053 COMPREHEN METABOLIC PANEL: CPT | Performed by: EMERGENCY MEDICINE

## 2019-02-21 PROCEDURE — 85027 COMPLETE CBC AUTOMATED: CPT | Performed by: EMERGENCY MEDICINE

## 2019-02-21 PROCEDURE — 76705 ECHO EXAM OF ABDOMEN: CPT

## 2019-02-21 PROCEDURE — 88304 TISSUE EXAM BY PATHOLOGIST: CPT | Performed by: SPECIALIST

## 2019-02-21 PROCEDURE — 76000 FLUOROSCOPY <1 HR PHYS/QHP: CPT

## 2019-02-21 PROCEDURE — C1726 CATH, BAL DIL, NON-VASCULAR: HCPCS | Performed by: SPECIALIST

## 2019-02-21 PROCEDURE — 25010000002 CEFTRIAXONE PER 250 MG: Performed by: EMERGENCY MEDICINE

## 2019-02-21 PROCEDURE — 25010000002 CEFOXITIN PER 1 G: Performed by: EMERGENCY MEDICINE

## 2019-02-21 PROCEDURE — 25010000002 DEXAMETHASONE PER 1 MG: Performed by: ANESTHESIOLOGY

## 2019-02-21 RX ORDER — ONDANSETRON 8 MG/1
8 TABLET, ORALLY DISINTEGRATING ORAL EVERY 6 HOURS PRN
Status: DISCONTINUED | OUTPATIENT
Start: 2019-02-21 | End: 2019-02-23 | Stop reason: HOSPADM

## 2019-02-21 RX ORDER — LIDOCAINE HYDROCHLORIDE 40 MG/ML
SOLUTION TOPICAL AS NEEDED
Status: DISCONTINUED | OUTPATIENT
Start: 2019-02-21 | End: 2019-02-21 | Stop reason: SURG

## 2019-02-21 RX ORDER — ACETAMINOPHEN 500 MG
1000 TABLET ORAL ONCE
Status: COMPLETED | OUTPATIENT
Start: 2019-02-21 | End: 2019-02-21

## 2019-02-21 RX ORDER — PROMETHAZINE HYDROCHLORIDE 25 MG/ML
6.25 INJECTION, SOLUTION INTRAMUSCULAR; INTRAVENOUS ONCE
Status: COMPLETED | OUTPATIENT
Start: 2019-02-21 | End: 2019-02-21

## 2019-02-21 RX ORDER — FAMOTIDINE 20 MG/1
20 TABLET, FILM COATED ORAL EVERY 12 HOURS SCHEDULED
Status: DISCONTINUED | OUTPATIENT
Start: 2019-02-21 | End: 2019-02-22 | Stop reason: SDUPTHER

## 2019-02-21 RX ORDER — MIDAZOLAM HYDROCHLORIDE 1 MG/ML
1 INJECTION INTRAMUSCULAR; INTRAVENOUS
Status: DISCONTINUED | OUTPATIENT
Start: 2019-02-21 | End: 2019-02-21 | Stop reason: HOSPADM

## 2019-02-21 RX ORDER — FENTANYL CITRATE 50 UG/ML
25 INJECTION, SOLUTION INTRAMUSCULAR; INTRAVENOUS AS NEEDED
Status: COMPLETED | OUTPATIENT
Start: 2019-02-21 | End: 2019-02-21

## 2019-02-21 RX ORDER — HYDROCODONE BITARTRATE AND ACETAMINOPHEN 7.5; 325 MG/1; MG/1
1 TABLET ORAL EVERY 4 HOURS PRN
Status: DISCONTINUED | OUTPATIENT
Start: 2019-02-21 | End: 2019-02-23 | Stop reason: HOSPADM

## 2019-02-21 RX ORDER — SODIUM CHLORIDE, SODIUM LACTATE, POTASSIUM CHLORIDE, CALCIUM CHLORIDE 600; 310; 30; 20 MG/100ML; MG/100ML; MG/100ML; MG/100ML
100 INJECTION, SOLUTION INTRAVENOUS CONTINUOUS
Status: DISCONTINUED | OUTPATIENT
Start: 2019-02-21 | End: 2019-02-21

## 2019-02-21 RX ORDER — SODIUM CHLORIDE 9 MG/ML
INJECTION, SOLUTION INTRAVENOUS AS NEEDED
Status: DISCONTINUED | OUTPATIENT
Start: 2019-02-21 | End: 2019-02-21 | Stop reason: HOSPADM

## 2019-02-21 RX ORDER — IBUPROFEN 800 MG/1
800 TABLET ORAL EVERY 8 HOURS PRN
COMMUNITY
End: 2021-04-22

## 2019-02-21 RX ORDER — IPRATROPIUM BROMIDE AND ALBUTEROL SULFATE 2.5; .5 MG/3ML; MG/3ML
3 SOLUTION RESPIRATORY (INHALATION) ONCE AS NEEDED
Status: DISCONTINUED | OUTPATIENT
Start: 2019-02-21 | End: 2019-02-21 | Stop reason: HOSPADM

## 2019-02-21 RX ORDER — FAMOTIDINE 20 MG/1
20 TABLET, FILM COATED ORAL 2 TIMES DAILY
COMMUNITY
End: 2021-04-22

## 2019-02-21 RX ORDER — GLYCOPYRROLATE 0.2 MG/ML
INJECTION INTRAMUSCULAR; INTRAVENOUS AS NEEDED
Status: DISCONTINUED | OUTPATIENT
Start: 2019-02-21 | End: 2019-02-21 | Stop reason: SURG

## 2019-02-21 RX ORDER — NALOXONE HCL 0.4 MG/ML
0.4 VIAL (ML) INJECTION AS NEEDED
Status: DISCONTINUED | OUTPATIENT
Start: 2019-02-21 | End: 2019-02-21 | Stop reason: HOSPADM

## 2019-02-21 RX ORDER — SODIUM CHLORIDE 0.9 % (FLUSH) 0.9 %
3 SYRINGE (ML) INJECTION EVERY 12 HOURS SCHEDULED
Status: DISCONTINUED | OUTPATIENT
Start: 2019-02-21 | End: 2019-02-21 | Stop reason: HOSPADM

## 2019-02-21 RX ORDER — FLUOXETINE HYDROCHLORIDE 20 MG/1
20 CAPSULE ORAL DAILY
COMMUNITY

## 2019-02-21 RX ORDER — IBUPROFEN 600 MG/1
600 TABLET ORAL ONCE AS NEEDED
Status: DISCONTINUED | OUTPATIENT
Start: 2019-02-21 | End: 2019-02-21 | Stop reason: HOSPADM

## 2019-02-21 RX ORDER — FAMOTIDINE 10 MG/ML
20 INJECTION, SOLUTION INTRAVENOUS ONCE
Status: COMPLETED | OUTPATIENT
Start: 2019-02-21 | End: 2019-02-21

## 2019-02-21 RX ORDER — PROMETHAZINE HYDROCHLORIDE 25 MG/ML
12.5 INJECTION, SOLUTION INTRAMUSCULAR; INTRAVENOUS EVERY 6 HOURS PRN
Status: DISCONTINUED | OUTPATIENT
Start: 2019-02-21 | End: 2019-02-23 | Stop reason: HOSPADM

## 2019-02-21 RX ORDER — OXYCODONE AND ACETAMINOPHEN 7.5; 325 MG/1; MG/1
2 TABLET ORAL EVERY 4 HOURS PRN
Status: DISCONTINUED | OUTPATIENT
Start: 2019-02-21 | End: 2019-02-21 | Stop reason: HOSPADM

## 2019-02-21 RX ORDER — CEFOXITIN 1 G/1
INJECTION, POWDER, FOR SOLUTION INTRAVENOUS AS NEEDED
Status: DISCONTINUED | OUTPATIENT
Start: 2019-02-21 | End: 2019-02-21 | Stop reason: SURG

## 2019-02-21 RX ORDER — NALOXONE HCL 0.4 MG/ML
0.4 VIAL (ML) INJECTION
Status: DISCONTINUED | OUTPATIENT
Start: 2019-02-21 | End: 2019-02-23 | Stop reason: HOSPADM

## 2019-02-21 RX ORDER — ONDANSETRON 8 MG/1
8 TABLET, ORALLY DISINTEGRATING ORAL EVERY 6 HOURS PRN
Status: DISCONTINUED | OUTPATIENT
Start: 2019-02-21 | End: 2019-02-21

## 2019-02-21 RX ORDER — PROPOFOL 10 MG/ML
VIAL (ML) INTRAVENOUS AS NEEDED
Status: DISCONTINUED | OUTPATIENT
Start: 2019-02-21 | End: 2019-02-21 | Stop reason: SURG

## 2019-02-21 RX ORDER — DEXAMETHASONE SODIUM PHOSPHATE 4 MG/ML
INJECTION, SOLUTION INTRA-ARTICULAR; INTRALESIONAL; INTRAMUSCULAR; INTRAVENOUS; SOFT TISSUE AS NEEDED
Status: DISCONTINUED | OUTPATIENT
Start: 2019-02-21 | End: 2019-02-21 | Stop reason: SURG

## 2019-02-21 RX ORDER — ROCURONIUM BROMIDE 10 MG/ML
INJECTION, SOLUTION INTRAVENOUS AS NEEDED
Status: DISCONTINUED | OUTPATIENT
Start: 2019-02-21 | End: 2019-02-21 | Stop reason: SURG

## 2019-02-21 RX ORDER — MORPHINE SULFATE 2 MG/ML
6 INJECTION, SOLUTION INTRAMUSCULAR; INTRAVENOUS EVERY 4 HOURS PRN
Status: DISCONTINUED | OUTPATIENT
Start: 2019-02-21 | End: 2019-02-21 | Stop reason: SDUPTHER

## 2019-02-21 RX ORDER — ONDANSETRON 4 MG/1
4 TABLET, ORALLY DISINTEGRATING ORAL EVERY 8 HOURS PRN
COMMUNITY
End: 2021-04-22

## 2019-02-21 RX ORDER — MIDAZOLAM HYDROCHLORIDE 1 MG/ML
2 INJECTION INTRAMUSCULAR; INTRAVENOUS
Status: DISCONTINUED | OUTPATIENT
Start: 2019-02-21 | End: 2019-02-21 | Stop reason: HOSPADM

## 2019-02-21 RX ORDER — ONDANSETRON 2 MG/ML
4 INJECTION INTRAMUSCULAR; INTRAVENOUS ONCE
Status: COMPLETED | OUTPATIENT
Start: 2019-02-21 | End: 2019-02-21

## 2019-02-21 RX ORDER — HYDROMORPHONE HCL 110MG/55ML
PATIENT CONTROLLED ANALGESIA SYRINGE INTRAVENOUS AS NEEDED
Status: DISCONTINUED | OUTPATIENT
Start: 2019-02-21 | End: 2019-02-21 | Stop reason: SURG

## 2019-02-21 RX ORDER — PROMETHAZINE HYDROCHLORIDE 25 MG/1
25 TABLET ORAL EVERY 6 HOURS PRN
COMMUNITY
End: 2021-04-22

## 2019-02-21 RX ORDER — BUPIVACAINE HYDROCHLORIDE AND EPINEPHRINE 5; 5 MG/ML; UG/ML
INJECTION, SOLUTION PERINEURAL AS NEEDED
Status: DISCONTINUED | OUTPATIENT
Start: 2019-02-21 | End: 2019-02-21 | Stop reason: HOSPADM

## 2019-02-21 RX ORDER — FAMOTIDINE 20 MG/1
20 TABLET, FILM COATED ORAL 2 TIMES DAILY
Status: DISCONTINUED | OUTPATIENT
Start: 2019-02-21 | End: 2019-02-21

## 2019-02-21 RX ORDER — DEXAMETHASONE SODIUM PHOSPHATE 4 MG/ML
4 INJECTION, SOLUTION INTRA-ARTICULAR; INTRALESIONAL; INTRAMUSCULAR; INTRAVENOUS; SOFT TISSUE ONCE AS NEEDED
Status: COMPLETED | OUTPATIENT
Start: 2019-02-21 | End: 2019-02-21

## 2019-02-21 RX ORDER — SUCRALFATE ORAL 1 G/10ML
1 SUSPENSION ORAL EVERY 6 HOURS SCHEDULED
Status: DISCONTINUED | OUTPATIENT
Start: 2019-02-21 | End: 2019-02-23 | Stop reason: HOSPADM

## 2019-02-21 RX ORDER — SUCRALFATE ORAL 1 G/10ML
1 SUSPENSION ORAL EVERY 6 HOURS SCHEDULED
Status: DISCONTINUED | OUTPATIENT
Start: 2019-02-21 | End: 2019-02-21

## 2019-02-21 RX ORDER — LIDOCAINE HYDROCHLORIDE 20 MG/ML
INJECTION, SOLUTION INFILTRATION; PERINEURAL AS NEEDED
Status: DISCONTINUED | OUTPATIENT
Start: 2019-02-21 | End: 2019-02-21 | Stop reason: SURG

## 2019-02-21 RX ORDER — LORAZEPAM 2 MG/ML
1 INJECTION INTRAMUSCULAR EVERY 4 HOURS PRN
Status: DISCONTINUED | OUTPATIENT
Start: 2019-02-21 | End: 2019-02-23 | Stop reason: HOSPADM

## 2019-02-21 RX ORDER — FENTANYL CITRATE 50 UG/ML
INJECTION, SOLUTION INTRAMUSCULAR; INTRAVENOUS AS NEEDED
Status: DISCONTINUED | OUTPATIENT
Start: 2019-02-21 | End: 2019-02-21 | Stop reason: SURG

## 2019-02-21 RX ORDER — SODIUM CHLORIDE 0.9 % (FLUSH) 0.9 %
1-10 SYRINGE (ML) INJECTION AS NEEDED
Status: DISCONTINUED | OUTPATIENT
Start: 2019-02-21 | End: 2019-02-21 | Stop reason: HOSPADM

## 2019-02-21 RX ORDER — ONDANSETRON 2 MG/ML
4 INJECTION INTRAMUSCULAR; INTRAVENOUS EVERY 6 HOURS PRN
Status: DISCONTINUED | OUTPATIENT
Start: 2019-02-21 | End: 2019-02-21 | Stop reason: SDUPTHER

## 2019-02-21 RX ORDER — ONDANSETRON HCL IN 0.9 % NACL 8 MG/50 ML
8 INTRAVENOUS SOLUTION, PIGGYBACK (ML) INTRAVENOUS EVERY 6 HOURS PRN
Status: DISCONTINUED | OUTPATIENT
Start: 2019-02-21 | End: 2019-02-23 | Stop reason: HOSPADM

## 2019-02-21 RX ORDER — ONDANSETRON 2 MG/ML
INJECTION INTRAMUSCULAR; INTRAVENOUS AS NEEDED
Status: DISCONTINUED | OUTPATIENT
Start: 2019-02-21 | End: 2019-02-21 | Stop reason: SURG

## 2019-02-21 RX ORDER — MEPERIDINE HYDROCHLORIDE 25 MG/ML
12.5 INJECTION INTRAMUSCULAR; INTRAVENOUS; SUBCUTANEOUS
Status: DISCONTINUED | OUTPATIENT
Start: 2019-02-21 | End: 2019-02-21 | Stop reason: HOSPADM

## 2019-02-21 RX ORDER — FAMOTIDINE 10 MG/ML
20 INJECTION, SOLUTION INTRAVENOUS EVERY 12 HOURS SCHEDULED
Status: DISCONTINUED | OUTPATIENT
Start: 2019-02-21 | End: 2019-02-22 | Stop reason: SDUPTHER

## 2019-02-21 RX ORDER — OXYCODONE AND ACETAMINOPHEN 10; 325 MG/1; MG/1
1 TABLET ORAL ONCE AS NEEDED
Status: DISCONTINUED | OUTPATIENT
Start: 2019-02-21 | End: 2019-02-21 | Stop reason: HOSPADM

## 2019-02-21 RX ORDER — LABETALOL HYDROCHLORIDE 5 MG/ML
5 INJECTION, SOLUTION INTRAVENOUS
Status: DISCONTINUED | OUTPATIENT
Start: 2019-02-21 | End: 2019-02-21 | Stop reason: HOSPADM

## 2019-02-21 RX ORDER — HYDROCODONE BITARTRATE AND ACETAMINOPHEN 5; 325 MG/1; MG/1
1 TABLET ORAL EVERY 6 HOURS PRN
COMMUNITY
End: 2021-04-22

## 2019-02-21 RX ORDER — DEXTROSE AND SODIUM CHLORIDE 5; .45 G/100ML; G/100ML
125 INJECTION, SOLUTION INTRAVENOUS CONTINUOUS
Status: DISCONTINUED | OUTPATIENT
Start: 2019-02-21 | End: 2019-02-23 | Stop reason: HOSPADM

## 2019-02-21 RX ORDER — FLUOXETINE HYDROCHLORIDE 20 MG/1
20 CAPSULE ORAL DAILY
Status: DISCONTINUED | OUTPATIENT
Start: 2019-02-22 | End: 2019-02-23 | Stop reason: HOSPADM

## 2019-02-21 RX ORDER — SIMETHICONE 80 MG
80 TABLET,CHEWABLE ORAL 4 TIMES DAILY PRN
Status: DISCONTINUED | OUTPATIENT
Start: 2019-02-21 | End: 2019-02-23 | Stop reason: HOSPADM

## 2019-02-21 RX ORDER — ONDANSETRON 2 MG/ML
4 INJECTION INTRAMUSCULAR; INTRAVENOUS ONCE AS NEEDED
Status: COMPLETED | OUTPATIENT
Start: 2019-02-21 | End: 2019-02-21

## 2019-02-21 RX ORDER — OXYCODONE HYDROCHLORIDE AND ACETAMINOPHEN 5; 325 MG/1; MG/1
1 TABLET ORAL EVERY 6 HOURS PRN
COMMUNITY

## 2019-02-21 RX ADMIN — ACETAMINOPHEN 1000 MG: 500 TABLET, FILM COATED ORAL at 12:14

## 2019-02-21 RX ADMIN — PROPOFOL 200 MG: 10 INJECTION, EMULSION INTRAVENOUS at 12:38

## 2019-02-21 RX ADMIN — PROMETHAZINE HYDROCHLORIDE 6.25 MG: 25 INJECTION INTRAMUSCULAR; INTRAVENOUS at 14:59

## 2019-02-21 RX ADMIN — FENTANYL CITRATE 25 MCG: 50 INJECTION, SOLUTION INTRAMUSCULAR; INTRAVENOUS at 14:54

## 2019-02-21 RX ADMIN — MORPHINE SULFATE 4 MG: 4 INJECTION INTRAVENOUS at 00:59

## 2019-02-21 RX ADMIN — ONDANSETRON HYDROCHLORIDE 4 MG: 2 SOLUTION INTRAMUSCULAR; INTRAVENOUS at 12:38

## 2019-02-21 RX ADMIN — MORPHINE SULFATE 4 MG: 4 INJECTION INTRAVENOUS at 10:15

## 2019-02-21 RX ADMIN — SODIUM CHLORIDE, POTASSIUM CHLORIDE, SODIUM LACTATE AND CALCIUM CHLORIDE: 600; 310; 30; 20 INJECTION, SOLUTION INTRAVENOUS at 13:29

## 2019-02-21 RX ADMIN — CEFOXITIN SODIUM 2 G: 2 POWDER, FOR SOLUTION INTRAVENOUS at 22:18

## 2019-02-21 RX ADMIN — CEFOXITIN SODIUM 2 G: 2 POWDER, FOR SOLUTION INTRAVENOUS at 10:19

## 2019-02-21 RX ADMIN — PROMETHAZINE HYDROCHLORIDE 12.5 MG: 25 INJECTION INTRAMUSCULAR; INTRAVENOUS at 20:04

## 2019-02-21 RX ADMIN — Medication 2 MG: at 14:18

## 2019-02-21 RX ADMIN — MORPHINE SULFATE 4 MG: 4 INJECTION INTRAVENOUS at 17:44

## 2019-02-21 RX ADMIN — FENTANYL CITRATE 25 MCG: 50 INJECTION, SOLUTION INTRAMUSCULAR; INTRAVENOUS at 15:04

## 2019-02-21 RX ADMIN — FENTANYL CITRATE 100 MCG: 50 INJECTION, SOLUTION INTRAMUSCULAR; INTRAVENOUS at 13:15

## 2019-02-21 RX ADMIN — DEXAMETHASONE SODIUM PHOSPHATE 8 MG: 4 INJECTION, SOLUTION INTRAMUSCULAR; INTRAVENOUS at 12:38

## 2019-02-21 RX ADMIN — MORPHINE SULFATE 4 MG: 4 INJECTION INTRAVENOUS at 23:22

## 2019-02-21 RX ADMIN — MORPHINE SULFATE 6 MG: 2 INJECTION, SOLUTION INTRAMUSCULAR; INTRAVENOUS at 04:10

## 2019-02-21 RX ADMIN — FAMOTIDINE 20 MG: 10 INJECTION INTRAVENOUS at 00:59

## 2019-02-21 RX ADMIN — ONDANSETRON HYDROCHLORIDE 4 MG: 2 INJECTION INTRAMUSCULAR; INTRAVENOUS at 14:49

## 2019-02-21 RX ADMIN — FENTANYL CITRATE 100 MCG: 50 INJECTION, SOLUTION INTRAMUSCULAR; INTRAVENOUS at 12:55

## 2019-02-21 RX ADMIN — HYDROCODONE BITARTRATE AND ACETAMINOPHEN 1 TABLET: 7.5; 325 TABLET ORAL at 22:18

## 2019-02-21 RX ADMIN — FENTANYL CITRATE 100 MCG: 50 INJECTION, SOLUTION INTRAMUSCULAR; INTRAVENOUS at 12:38

## 2019-02-21 RX ADMIN — DEXAMETHASONE SODIUM PHOSPHATE 4 MG: 4 INJECTION, SOLUTION INTRAMUSCULAR; INTRAVENOUS at 12:14

## 2019-02-21 RX ADMIN — MIDAZOLAM HYDROCHLORIDE 2 MG: 1 INJECTION, SOLUTION INTRAMUSCULAR; INTRAVENOUS at 12:14

## 2019-02-21 RX ADMIN — HYDROMORPHONE HYDROCHLORIDE 2 MG: 2 INJECTION, SOLUTION INTRAMUSCULAR; INTRAVENOUS; SUBCUTANEOUS at 14:14

## 2019-02-21 RX ADMIN — OXYCODONE HYDROCHLORIDE AND ACETAMINOPHEN 2 TABLET: 7.5; 325 TABLET ORAL at 15:23

## 2019-02-21 RX ADMIN — ROCURONIUM BROMIDE 40 MG: 10 INJECTION INTRAVENOUS at 12:38

## 2019-02-21 RX ADMIN — FAMOTIDINE 20 MG: 10 INJECTION INTRAVENOUS at 20:25

## 2019-02-21 RX ADMIN — LIDOCAINE HYDROCHLORIDE 50 MG: 20 INJECTION, SOLUTION INFILTRATION; PERINEURAL at 12:38

## 2019-02-21 RX ADMIN — SUCRALFATE 1 G: 1 SUSPENSION ORAL at 23:25

## 2019-02-21 RX ADMIN — CEFTRIAXONE SODIUM 1 G: 1 INJECTION, POWDER, FOR SOLUTION INTRAMUSCULAR; INTRAVENOUS at 02:04

## 2019-02-21 RX ADMIN — PROMETHAZINE HYDROCHLORIDE 12.5 MG: 25 INJECTION INTRAMUSCULAR; INTRAVENOUS at 03:48

## 2019-02-21 RX ADMIN — CEFOXITIN 1 G: 1 INJECTION, POWDER, FOR SOLUTION INTRAVENOUS at 04:10

## 2019-02-21 RX ADMIN — HYDROMORPHONE HYDROCHLORIDE 1 MG: 1 INJECTION, SOLUTION INTRAMUSCULAR; INTRAVENOUS; SUBCUTANEOUS at 02:04

## 2019-02-21 RX ADMIN — ONDANSETRON 4 MG: 2 SOLUTION INTRAMUSCULAR; INTRAVENOUS at 00:59

## 2019-02-21 RX ADMIN — FENTANYL CITRATE 25 MCG: 50 INJECTION, SOLUTION INTRAMUSCULAR; INTRAVENOUS at 15:07

## 2019-02-21 RX ADMIN — METHYLCELLULOSE 1000 MG: 500 TABLET ORAL at 20:14

## 2019-02-21 RX ADMIN — SODIUM CHLORIDE, POTASSIUM CHLORIDE, SODIUM LACTATE AND CALCIUM CHLORIDE 100 ML/HR: 600; 310; 30; 20 INJECTION, SOLUTION INTRAVENOUS at 15:05

## 2019-02-21 RX ADMIN — MORPHINE SULFATE 4 MG: 4 INJECTION INTRAVENOUS at 20:03

## 2019-02-21 RX ADMIN — DEXTROSE AND SODIUM CHLORIDE 125 ML/HR: 5; 450 INJECTION, SOLUTION INTRAVENOUS at 16:25

## 2019-02-21 RX ADMIN — SODIUM CHLORIDE, POTASSIUM CHLORIDE, SODIUM LACTATE AND CALCIUM CHLORIDE 100 ML/HR: 600; 310; 30; 20 INJECTION, SOLUTION INTRAVENOUS at 03:42

## 2019-02-21 RX ADMIN — SUCRALFATE 1 G: 1 SUSPENSION ORAL at 17:46

## 2019-02-21 RX ADMIN — SODIUM CHLORIDE, POTASSIUM CHLORIDE, SODIUM LACTATE AND CALCIUM CHLORIDE 100 ML/HR: 600; 310; 30; 20 INJECTION, SOLUTION INTRAVENOUS at 12:17

## 2019-02-21 RX ADMIN — CEFOXITIN SODIUM 2 G: 2 POWDER, FOR SOLUTION INTRAVENOUS at 16:53

## 2019-02-21 RX ADMIN — GLYCOPYRROLATE 0.2 MG: 0.2 INJECTION, SOLUTION INTRAMUSCULAR; INTRAVENOUS at 14:18

## 2019-02-21 RX ADMIN — CEFOXITIN SODIUM 1 G: 1 POWDER, FOR SOLUTION INTRAVENOUS at 12:41

## 2019-02-21 RX ADMIN — LIDOCAINE HYDROCHLORIDE 1 EACH: 40 SOLUTION TOPICAL at 12:39

## 2019-02-21 RX ADMIN — FENTANYL CITRATE 25 MCG: 50 INJECTION, SOLUTION INTRAMUSCULAR; INTRAVENOUS at 14:56

## 2019-02-21 NOTE — ANESTHESIA POSTPROCEDURE EVALUATION
"Patient: Mey Valle    Procedure Summary     Date:  02/21/19 Room / Location:   [facility] OR 09 /  [facility] OR    Anesthesia Start:  1236 Anesthesia Stop:  1427    Procedure:  CHOLECYSTECTOMY LAPAROSCOPIC INTRAOPERATIVE CHOLANGIOGRAM (N/A Abdomen) Diagnosis:      Surgeon:  Cabrera Enriquez MD Provider:  Musa Williamson CRNA    Anesthesia Type:  general ASA Status:  3          Anesthesia Type: general  Last vitals  BP   157/84 (02/21/19 1525)   Temp   97.9 °F (36.6 °C) (02/21/19 1425)   Pulse   95 (02/21/19 1525)   Resp   12 (02/21/19 1525)     SpO2   97 % (02/21/19 1525)     Post Anesthesia Care and Evaluation    Patient location during evaluation: PHASE II  Patient participation: complete - patient participated  Level of consciousness: awake and awake and alert  Pain score: 0  Pain management: adequate  Airway patency: patent  Anesthetic complications: No anesthetic complications  PONV Status: none  Cardiovascular status: acceptable  Respiratory status: acceptable  Hydration status: acceptable    Comments: Patient discharged according to acceptable Lia score per RN assessment. See nursing records for further information.     Blood pressure 157/84, pulse 95, temperature 97.9 °F (36.6 °C), temperature source Temporal, resp. rate 12, height 175.3 cm (69\"), weight (!) 140 kg (309 lb 3.2 oz), last menstrual period 01/15/2019, SpO2 97 %, not currently breastfeeding.        "

## 2019-02-21 NOTE — ANESTHESIA PROCEDURE NOTES
Airway  Airway not difficult    General Information and Staff    Patient location during procedure: OR  CRNA: Musa Williamson CRNA    Indications and Patient Condition  Indications for airway management: airway protection    Preoxygenated: yes      Final Airway Details  Final airway type: endotracheal airway      Successful airway: ETT  Cuffed: yes   Successful intubation technique: direct laryngoscopy  Facilitating devices/methods: intubating stylet  Blade: [place]  Blade size: 2  ETT size (mm): 7.0  Cormack-Lehane Classification: grade I - full view of glottis  Placement verified by: chest auscultation and capnometry   Cuff volume (mL): 6  Measured from: lips  ETT to lips (cm): 21  Number of attempts at approach: 1    Additional Comments  ATRAUMATIC INTUBATION

## 2019-02-21 NOTE — ANESTHESIA PREPROCEDURE EVALUATION
Anesthesia Evaluation     Patient summary reviewed   no history of anesthetic complications:  NPO Solid Status: > 8 hours  NPO Liquid Status: > 8 hours           Airway   Mallampati: I  TM distance: >3 FB  Neck ROM: full  No difficulty expected  Dental - normal exam     Pulmonary    (-) asthma, sleep apnea, not a smoker  Cardiovascular   Exercise tolerance: good (4-7 METS)    (-) hypertension, hyperlipidemia      Neuro/Psych  (+) seizures (at 11 years of age, none since),     (-) TIA, CVA  GI/Hepatic/Renal/Endo    (+) morbid obesity, GERD,    (-) liver disease, no renal disease, diabetes    Musculoskeletal (-) negative ROS    Abdominal    Substance History      OB/GYN          Other - negative ROS                       Anesthesia Plan    ASA 3     general     intravenous induction   Anesthetic plan, all risks, benefits, and alternatives have been provided, discussed and informed consent has been obtained with: patient.

## 2019-02-22 LAB
ALBUMIN SERPL-MCNC: 3.8 G/DL (ref 3.5–5)
ALBUMIN/GLOB SERPL: 1.6 G/DL (ref 1.1–2.5)
ALP SERPL-CCNC: 105 U/L (ref 24–120)
ALT SERPL W P-5'-P-CCNC: 130 U/L (ref 0–54)
ANION GAP SERPL CALCULATED.3IONS-SCNC: 9 MMOL/L (ref 4–13)
AST SERPL-CCNC: 84 U/L (ref 7–45)
BILIRUB SERPL-MCNC: 0.4 MG/DL (ref 0.1–1)
BUN BLD-MCNC: 8 MG/DL (ref 5–21)
BUN/CREAT SERPL: 12.9 (ref 7–25)
CALCIUM SPEC-SCNC: 9.2 MG/DL (ref 8.4–10.4)
CHLORIDE SERPL-SCNC: 106 MMOL/L (ref 98–110)
CO2 SERPL-SCNC: 24 MMOL/L (ref 24–31)
CREAT BLD-MCNC: 0.62 MG/DL (ref 0.5–1.4)
DEPRECATED RDW RBC AUTO: 39.3 FL (ref 40–54)
ERYTHROCYTE [DISTWIDTH] IN BLOOD BY AUTOMATED COUNT: 12.9 % (ref 12–15)
GFR SERPL CREATININE-BSD FRML MDRD: 115 ML/MIN/1.73
GLOBULIN UR ELPH-MCNC: 2.4 GM/DL
GLUCOSE BLD-MCNC: 126 MG/DL (ref 70–100)
HCT VFR BLD AUTO: 35.2 % (ref 37–47)
HGB BLD-MCNC: 11.9 G/DL (ref 12–16)
MCH RBC QN AUTO: 28.7 PG (ref 28–32)
MCHC RBC AUTO-ENTMCNC: 33.8 G/DL (ref 33–36)
MCV RBC AUTO: 85 FL (ref 82–98)
PLATELET # BLD AUTO: 297 10*3/MM3 (ref 130–400)
PMV BLD AUTO: 11.5 FL (ref 6–12)
POTASSIUM BLD-SCNC: 4.5 MMOL/L (ref 3.5–5.3)
PROT SERPL-MCNC: 6.2 G/DL (ref 6.3–8.7)
RBC # BLD AUTO: 4.14 10*6/MM3 (ref 4.2–5.4)
SODIUM BLD-SCNC: 139 MMOL/L (ref 135–145)
WBC NRBC COR # BLD: 8.28 10*3/MM3 (ref 4.8–10.8)

## 2019-02-22 PROCEDURE — 25010000002 ENOXAPARIN PER 10 MG: Performed by: SPECIALIST

## 2019-02-22 PROCEDURE — 85027 COMPLETE CBC AUTOMATED: CPT | Performed by: SPECIALIST

## 2019-02-22 PROCEDURE — 25010000002 PROMETHAZINE PER 50 MG: Performed by: SPECIALIST

## 2019-02-22 PROCEDURE — 80053 COMPREHEN METABOLIC PANEL: CPT | Performed by: SPECIALIST

## 2019-02-22 PROCEDURE — 25010000002 MORPHINE PER 10 MG: Performed by: SPECIALIST

## 2019-02-22 PROCEDURE — G0378 HOSPITAL OBSERVATION PER HR: HCPCS

## 2019-02-22 PROCEDURE — 25010000002 CEFOXITIN PER 1 G: Performed by: SPECIALIST

## 2019-02-22 RX ORDER — FAMOTIDINE 20 MG/1
20 TABLET, FILM COATED ORAL 2 TIMES DAILY
Status: DISCONTINUED | OUTPATIENT
Start: 2019-02-22 | End: 2019-02-23 | Stop reason: HOSPADM

## 2019-02-22 RX ORDER — SUCRALFATE ORAL 1 G/10ML
1 SUSPENSION ORAL EVERY 6 HOURS SCHEDULED
Status: DISCONTINUED | OUTPATIENT
Start: 2019-02-22 | End: 2019-02-22 | Stop reason: SDUPTHER

## 2019-02-22 RX ORDER — OXYCODONE HYDROCHLORIDE AND ACETAMINOPHEN 5; 325 MG/1; MG/1
1 TABLET ORAL EVERY 4 HOURS PRN
Status: DISCONTINUED | OUTPATIENT
Start: 2019-02-22 | End: 2019-02-23 | Stop reason: HOSPADM

## 2019-02-22 RX ORDER — SIMETHICONE 80 MG
80 TABLET,CHEWABLE ORAL 4 TIMES DAILY PRN
Status: DISCONTINUED | OUTPATIENT
Start: 2019-02-22 | End: 2019-02-22 | Stop reason: SDUPTHER

## 2019-02-22 RX ADMIN — FAMOTIDINE 20 MG: 20 TABLET, FILM COATED ORAL at 09:04

## 2019-02-22 RX ADMIN — DEXTROSE AND SODIUM CHLORIDE 125 ML/HR: 5; 450 INJECTION, SOLUTION INTRAVENOUS at 01:48

## 2019-02-22 RX ADMIN — OXYCODONE HYDROCHLORIDE AND ACETAMINOPHEN 1 TABLET: 5; 325 TABLET ORAL at 12:05

## 2019-02-22 RX ADMIN — OXYCODONE HYDROCHLORIDE AND ACETAMINOPHEN 1 TABLET: 5; 325 TABLET ORAL at 20:31

## 2019-02-22 RX ADMIN — MORPHINE SULFATE 4 MG: 4 INJECTION INTRAVENOUS at 02:17

## 2019-02-22 RX ADMIN — MORPHINE SULFATE 4 MG: 4 INJECTION INTRAVENOUS at 00:37

## 2019-02-22 RX ADMIN — HYDROCODONE BITARTRATE AND ACETAMINOPHEN 1 TABLET: 7.5; 325 TABLET ORAL at 04:05

## 2019-02-22 RX ADMIN — OXYCODONE HYDROCHLORIDE AND ACETAMINOPHEN 1 TABLET: 5; 325 TABLET ORAL at 16:03

## 2019-02-22 RX ADMIN — METHYLCELLULOSE 1000 MG: 500 TABLET ORAL at 20:37

## 2019-02-22 RX ADMIN — SUCRALFATE 1 G: 1 SUSPENSION ORAL at 17:28

## 2019-02-22 RX ADMIN — FAMOTIDINE 20 MG: 20 TABLET, FILM COATED ORAL at 20:32

## 2019-02-22 RX ADMIN — SUCRALFATE 1 G: 1 SUSPENSION ORAL at 06:12

## 2019-02-22 RX ADMIN — CEFOXITIN SODIUM 2 G: 2 POWDER, FOR SOLUTION INTRAVENOUS at 21:20

## 2019-02-22 RX ADMIN — SUCRALFATE 1 G: 1 SUSPENSION ORAL at 23:36

## 2019-02-22 RX ADMIN — PROMETHAZINE HYDROCHLORIDE 12.5 MG: 25 INJECTION INTRAMUSCULAR; INTRAVENOUS at 02:17

## 2019-02-22 RX ADMIN — CEFOXITIN SODIUM 2 G: 2 POWDER, FOR SOLUTION INTRAVENOUS at 15:21

## 2019-02-22 RX ADMIN — MORPHINE SULFATE 4 MG: 4 INJECTION INTRAVENOUS at 06:19

## 2019-02-22 RX ADMIN — ONDANSETRON 8 MG: 8 TABLET, ORALLY DISINTEGRATING ORAL at 08:02

## 2019-02-22 RX ADMIN — DEXTROSE AND SODIUM CHLORIDE 125 ML/HR: 5; 450 INJECTION, SOLUTION INTRAVENOUS at 11:16

## 2019-02-22 RX ADMIN — CEFOXITIN SODIUM 2 G: 2 POWDER, FOR SOLUTION INTRAVENOUS at 03:57

## 2019-02-22 RX ADMIN — METHYLCELLULOSE 1000 MG: 500 TABLET ORAL at 09:05

## 2019-02-22 RX ADMIN — ENOXAPARIN SODIUM 30 MG: 30 INJECTION SUBCUTANEOUS at 15:15

## 2019-02-22 RX ADMIN — FLUOXETINE HYDROCHLORIDE 20 MG: 20 CAPSULE ORAL at 09:04

## 2019-02-22 RX ADMIN — CEFOXITIN SODIUM 2 G: 2 POWDER, FOR SOLUTION INTRAVENOUS at 10:12

## 2019-02-22 RX ADMIN — HYDROCODONE BITARTRATE AND ACETAMINOPHEN 1 TABLET: 7.5; 325 TABLET ORAL at 07:58

## 2019-02-22 RX ADMIN — DEXTROSE AND SODIUM CHLORIDE 125 ML/HR: 5; 450 INJECTION, SOLUTION INTRAVENOUS at 20:33

## 2019-02-23 VITALS
HEIGHT: 69 IN | HEART RATE: 62 BPM | TEMPERATURE: 98.1 F | WEIGHT: 293 LBS | BODY MASS INDEX: 43.4 KG/M2 | SYSTOLIC BLOOD PRESSURE: 132 MMHG | RESPIRATION RATE: 16 BRPM | DIASTOLIC BLOOD PRESSURE: 71 MMHG | OXYGEN SATURATION: 97 %

## 2019-02-23 LAB
ALBUMIN SERPL-MCNC: 3.3 G/DL (ref 3.5–5)
ALBUMIN/GLOB SERPL: 1.4 G/DL (ref 1.1–2.5)
ALP SERPL-CCNC: 69 U/L (ref 24–120)
ALT SERPL W P-5'-P-CCNC: 69 U/L (ref 0–54)
ANION GAP SERPL CALCULATED.3IONS-SCNC: 6 MMOL/L (ref 4–13)
AST SERPL-CCNC: 31 U/L (ref 7–45)
BILIRUB SERPL-MCNC: 0.2 MG/DL (ref 0.1–1)
BUN BLD-MCNC: 12 MG/DL (ref 5–21)
BUN/CREAT SERPL: 18.8 (ref 7–25)
CALCIUM SPEC-SCNC: 8.5 MG/DL (ref 8.4–10.4)
CHLORIDE SERPL-SCNC: 109 MMOL/L (ref 98–110)
CO2 SERPL-SCNC: 25 MMOL/L (ref 24–31)
CREAT BLD-MCNC: 0.64 MG/DL (ref 0.5–1.4)
DEPRECATED RDW RBC AUTO: 41.1 FL (ref 40–54)
ERYTHROCYTE [DISTWIDTH] IN BLOOD BY AUTOMATED COUNT: 13.2 % (ref 12–15)
GFR SERPL CREATININE-BSD FRML MDRD: 111 ML/MIN/1.73
GLOBULIN UR ELPH-MCNC: 2.4 GM/DL
GLUCOSE BLD-MCNC: 116 MG/DL (ref 70–100)
HCT VFR BLD AUTO: 31.2 % (ref 37–47)
HGB BLD-MCNC: 10.5 G/DL (ref 12–16)
MCH RBC QN AUTO: 29 PG (ref 28–32)
MCHC RBC AUTO-ENTMCNC: 33.7 G/DL (ref 33–36)
MCV RBC AUTO: 86.2 FL (ref 82–98)
PLATELET # BLD AUTO: 238 10*3/MM3 (ref 130–400)
PMV BLD AUTO: 11.2 FL (ref 6–12)
POTASSIUM BLD-SCNC: 3.8 MMOL/L (ref 3.5–5.3)
PROT SERPL-MCNC: 5.7 G/DL (ref 6.3–8.7)
RBC # BLD AUTO: 3.62 10*6/MM3 (ref 4.2–5.4)
SODIUM BLD-SCNC: 140 MMOL/L (ref 135–145)
WBC NRBC COR # BLD: 7.3 10*3/MM3 (ref 4.8–10.8)

## 2019-02-23 PROCEDURE — 25010000002 ENOXAPARIN PER 10 MG: Performed by: SPECIALIST

## 2019-02-23 PROCEDURE — 25010000002 CEFOXITIN PER 1 G: Performed by: SPECIALIST

## 2019-02-23 PROCEDURE — 85027 COMPLETE CBC AUTOMATED: CPT | Performed by: SPECIALIST

## 2019-02-23 PROCEDURE — G0378 HOSPITAL OBSERVATION PER HR: HCPCS

## 2019-02-23 PROCEDURE — 80053 COMPREHEN METABOLIC PANEL: CPT | Performed by: SPECIALIST

## 2019-02-23 RX ORDER — METHYLCELLULOSE 2 G/19G
2 POWDER, FOR SOLUTION ORAL DAILY
Qty: 60 G | Refills: 6 | Status: SHIPPED | OUTPATIENT
Start: 2019-02-23 | End: 2019-03-25

## 2019-02-23 RX ORDER — ONDANSETRON HCL 8 MG
8 TABLET ORAL EVERY 8 HOURS PRN
Qty: 10 TABLET | Refills: 1 | Status: SHIPPED | OUTPATIENT
Start: 2019-02-23 | End: 2021-04-22

## 2019-02-23 RX ORDER — LEVOFLOXACIN 500 MG/1
500 TABLET, FILM COATED ORAL DAILY
Qty: 7 TABLET | Refills: 0 | Status: SHIPPED | OUTPATIENT
Start: 2019-02-23 | End: 2019-03-02

## 2019-02-23 RX ORDER — HYDROCODONE BITARTRATE AND ACETAMINOPHEN 7.5; 325 MG/1; MG/1
1 TABLET ORAL EVERY 4 HOURS PRN
Qty: 40 TABLET | Refills: 0 | Status: SHIPPED | OUTPATIENT
Start: 2019-02-23 | End: 2021-04-22

## 2019-02-23 RX ADMIN — METHYLCELLULOSE 1000 MG: 500 TABLET ORAL at 08:58

## 2019-02-23 RX ADMIN — ENOXAPARIN SODIUM 30 MG: 30 INJECTION SUBCUTANEOUS at 05:07

## 2019-02-23 RX ADMIN — OXYCODONE HYDROCHLORIDE AND ACETAMINOPHEN 1 TABLET: 5; 325 TABLET ORAL at 14:20

## 2019-02-23 RX ADMIN — FLUOXETINE HYDROCHLORIDE 20 MG: 20 CAPSULE ORAL at 08:58

## 2019-02-23 RX ADMIN — CEFOXITIN SODIUM 2 G: 2 POWDER, FOR SOLUTION INTRAVENOUS at 05:06

## 2019-02-23 RX ADMIN — FAMOTIDINE 20 MG: 20 TABLET, FILM COATED ORAL at 08:58

## 2019-02-23 RX ADMIN — CEFOXITIN SODIUM 2 G: 2 POWDER, FOR SOLUTION INTRAVENOUS at 09:09

## 2019-02-23 RX ADMIN — OXYCODONE HYDROCHLORIDE AND ACETAMINOPHEN 1 TABLET: 5; 325 TABLET ORAL at 00:53

## 2019-02-23 RX ADMIN — SUCRALFATE 1 G: 1 SUSPENSION ORAL at 11:33

## 2019-02-23 RX ADMIN — SUCRALFATE 1 G: 1 SUSPENSION ORAL at 05:09

## 2019-02-28 LAB
CYTO UR: NORMAL
LAB AP CASE REPORT: NORMAL
PATH REPORT.FINAL DX SPEC: NORMAL
PATH REPORT.GROSS SPEC: NORMAL

## 2019-03-28 ENCOUNTER — OFFICE VISIT (OUTPATIENT)
Dept: PRIMARY CARE CLINIC | Age: 29
End: 2019-03-28
Payer: COMMERCIAL

## 2019-03-28 VITALS
HEART RATE: 78 BPM | DIASTOLIC BLOOD PRESSURE: 84 MMHG | OXYGEN SATURATION: 98 % | RESPIRATION RATE: 16 BRPM | WEIGHT: 293 LBS | SYSTOLIC BLOOD PRESSURE: 130 MMHG | BODY MASS INDEX: 44.3 KG/M2 | TEMPERATURE: 97.2 F

## 2019-03-28 DIAGNOSIS — J40 BRONCHITIS: Primary | ICD-10-CM

## 2019-03-28 PROCEDURE — 99213 OFFICE O/P EST LOW 20 MIN: CPT | Performed by: NURSE PRACTITIONER

## 2019-03-28 PROCEDURE — G8427 DOCREV CUR MEDS BY ELIG CLIN: HCPCS | Performed by: NURSE PRACTITIONER

## 2019-03-28 PROCEDURE — G8417 CALC BMI ABV UP PARAM F/U: HCPCS | Performed by: NURSE PRACTITIONER

## 2019-03-28 PROCEDURE — G8484 FLU IMMUNIZE NO ADMIN: HCPCS | Performed by: NURSE PRACTITIONER

## 2019-03-28 PROCEDURE — 1036F TOBACCO NON-USER: CPT | Performed by: NURSE PRACTITIONER

## 2019-03-28 RX ORDER — CEFDINIR 300 MG/1
300 CAPSULE ORAL 2 TIMES DAILY
Qty: 14 CAPSULE | Refills: 0 | Status: SHIPPED | OUTPATIENT
Start: 2019-03-28 | End: 2019-04-04

## 2019-03-28 ASSESSMENT — ENCOUNTER SYMPTOMS
WHEEZING: 1
COUGH: 1

## 2019-06-27 ENCOUNTER — OFFICE VISIT (OUTPATIENT)
Dept: VASCULAR SURGERY | Age: 29
End: 2019-06-27
Payer: COMMERCIAL

## 2019-06-27 VITALS
TEMPERATURE: 98.6 F | SYSTOLIC BLOOD PRESSURE: 109 MMHG | OXYGEN SATURATION: 98 % | HEART RATE: 76 BPM | RESPIRATION RATE: 18 BRPM | DIASTOLIC BLOOD PRESSURE: 74 MMHG

## 2019-06-27 DIAGNOSIS — M79.604 RIGHT LEG PAIN: ICD-10-CM

## 2019-06-27 DIAGNOSIS — I83.93 SPIDER VEINS OF BOTH LOWER EXTREMITIES: ICD-10-CM

## 2019-06-27 DIAGNOSIS — I83.893 VARICOSE VEINS OF BILATERAL LOWER EXTREMITIES WITH OTHER COMPLICATIONS: ICD-10-CM

## 2019-06-27 PROCEDURE — G8417 CALC BMI ABV UP PARAM F/U: HCPCS | Performed by: PHYSICIAN ASSISTANT

## 2019-06-27 PROCEDURE — 99204 OFFICE O/P NEW MOD 45 MIN: CPT | Performed by: PHYSICIAN ASSISTANT

## 2019-06-27 PROCEDURE — 1036F TOBACCO NON-USER: CPT | Performed by: PHYSICIAN ASSISTANT

## 2019-06-27 PROCEDURE — G8427 DOCREV CUR MEDS BY ELIG CLIN: HCPCS | Performed by: PHYSICIAN ASSISTANT

## 2019-06-27 NOTE — PROGRESS NOTES
Patient Care Team:  KIMBERLEY Bowling CNP as PCP - General (Family Nurse Practitioner)  KIMBERLEY Bowling CNP as PCP - [facility] Empaneled Provider      History and Physical:    Mrs. Clark Aguilar is a 32 yo female who presents today for evaluation of suspected  varicose veins, spider veins. She reports prominent veins on the bilateral leg(s), spider veins on the bilateral leg(s). She reports that she has had the varicosities for many years. She reports that the cluster of veins on the right lower leg hurt (burn and stings). She has had to pregnancies but states that her symptoms did not seem to worsen with the pregnancies. She reports previous treatment includes none. She does not have a history of spontaneous rupture. She denies any history of DVT or SVT. She reports a FH of varicose vein. Differential diagnosis for leg pain includes but is not limited to: varicose veins, peripheral vascular disease, arthritic pain, DVT, lumbar disc disease, and neurogenic pain.     Sherri Mcburney is a 33 y.o. female with the following history reviewed and recorded in [facility]:  Patient Active Problem List    Diagnosis Date Noted    Spider veins of both lower extremities 06/27/2019    Right leg pain 06/27/2019    Varicose veins of both lower extremities 06/27/2019    Varicose veins of bilateral lower extremities with other complications 35/59/2388    39 weeks gestation of pregnancy 10/29/2018    PIH (pregnancy induced hypertension), antepartum 10/24/2018    Hypertension 10/24/2018    37 weeks gestation of pregnancy 10/16/2018    HTN (hypertension) 10/10/2018    Cramping affecting pregnancy, antepartum 08/24/2018    Depression with anxiety 05/01/2017    Anxiety 01/10/2017    Abnormal ultrasound of gallbladder 01/31/2014    Supervision of pregnancy with history of abortive outcome 09/20/2013    Missed ab 05/01/2013    Allergic rhinitis, seasonal 04/04/2013     Current Outpatient Medications   Medication Sig Dispense Refill    FLUoxetine (PROZAC) 20 MG capsule Take 1 capsule by mouth daily 30 capsule 11    promethazine (PHENERGAN) 25 MG tablet Take 1 tablet by mouth every 6 hours as needed for Nausea 20 tablet 0    ondansetron (ZOFRAN ODT) 4 MG disintegrating tablet Take 1 tablet by mouth every 8 hours as needed for Nausea or Vomiting 15 tablet 0    famotidine (PEPCID) 20 MG tablet Take 1 tablet by mouth 2 times daily 60 tablet 0    ibuprofen (ADVIL;MOTRIN) 800 MG tablet Take 1 tablet by mouth every 8 hours as needed for Pain 90 tablet 0     No current facility-administered medications for this visit. Allergies: Patient has no known allergies. Past Medical History:   Diagnosis Date    Chiari malformation     History of chicken pox     Recurrent acute otitis media     as a child, required tubes, then L TM reconstruction    Seizures ([facility] [address]     absent, as a child      Past Surgical History:   Procedure Laterality Date    DILATION AND CURETTAGE  13    INNER EAR SURGERY      L ear TM reconstruction    TYMPANOSTOMY TUBE PLACEMENT       Family History   Problem Relation Age of Onset    High Blood Pressure Mother     Depression Mother     Colon Polyps Father     Colon Polyps Maternal Grandfather     Colon Cancer Neg Hx      Social History     Tobacco Use    Smoking status: Former Smoker     Packs/day: 0.50     Years: 3.00     Pack years: 1.50     Types: Cigarettes     Last attempt to quit: 2013     Years since quittin.9    Smokeless tobacco: Never Used   Substance Use Topics    Alcohol use: No       Medical records reviewed       Review of Systems    Constitutional - no significant activity change, appetite change, or unexpected weight change. No fever or chills. No diaphoresis or significant fatigue. HENT - no significant rhinorrhea or epistaxis. No tinnitus or significant hearing loss. Eyes - no sudden vision change or amaurosis.   Respiratory - no significant shortness of

## 2019-07-09 DIAGNOSIS — I83.893 VARICOSE VEINS OF BILATERAL LOWER EXTREMITIES WITH OTHER COMPLICATIONS: Primary | ICD-10-CM

## 2019-07-10 ENCOUNTER — NURSE ONLY (OUTPATIENT)
Dept: PRIMARY CARE CLINIC | Age: 29
End: 2019-07-10
Payer: COMMERCIAL

## 2019-07-10 DIAGNOSIS — R10.32 LEFT LOWER QUADRANT PAIN: Primary | ICD-10-CM

## 2019-07-10 LAB
APPEARANCE FLUID: NORMAL
BILIRUBIN, POC: NORMAL
BLOOD URINE, POC: NORMAL
CLARITY, POC: CLEAR
COLOR, POC: YELLOW
GLUCOSE URINE, POC: NORMAL
KETONES, POC: NORMAL
LEUKOCYTE EST, POC: NORMAL
NITRITE, POC: NORMAL
PH, POC: 7
PROTEIN, POC: NORMAL
SPECIFIC GRAVITY, POC: 1.01
UROBILINOGEN, POC: 0.2

## 2019-07-10 PROCEDURE — 81002 URINALYSIS NONAUTO W/O SCOPE: CPT | Performed by: FAMILY MEDICINE

## 2019-07-15 RX ORDER — FLUOXETINE HYDROCHLORIDE 20 MG/1
20 CAPSULE ORAL DAILY
Qty: 30 CAPSULE | Refills: 11 | Status: SHIPPED | OUTPATIENT
Start: 2019-07-15 | End: 2020-09-30

## 2019-08-29 DIAGNOSIS — Z32.00 POSSIBLE PREGNANCY: ICD-10-CM

## 2019-08-29 DIAGNOSIS — Z32.00 POSSIBLE PREGNANCY: Primary | ICD-10-CM

## 2019-08-29 LAB — GONADOTROPIN, CHORIONIC (HCG) QUANT: 16.6 MIU/ML (ref 0–5.3)

## 2019-09-09 ENCOUNTER — INITIAL PRENATAL (OUTPATIENT)
Dept: OBGYN | Age: 29
End: 2019-09-09

## 2019-09-09 VITALS
DIASTOLIC BLOOD PRESSURE: 70 MMHG | WEIGHT: 283 LBS | SYSTOLIC BLOOD PRESSURE: 118 MMHG | BODY MASS INDEX: 41.79 KG/M2 | HEART RATE: 72 BPM

## 2019-09-09 DIAGNOSIS — Z3A.01 7 WEEKS GESTATION OF PREGNANCY: ICD-10-CM

## 2019-09-09 DIAGNOSIS — Z34.80 SUPERVISION OF OTHER NORMAL PREGNANCY: ICD-10-CM

## 2019-09-09 DIAGNOSIS — Z34.80 SUPERVISION OF OTHER NORMAL PREGNANCY: Primary | ICD-10-CM

## 2019-09-09 LAB
ABO/RH: NORMAL
ANTIBODY SCREEN: NORMAL
GONADOTROPIN, CHORIONIC (HCG) QUANT: 3116 MIU/ML (ref 0–5.3)
PROGESTERONE LEVEL: 8.35 NG/ML

## 2019-09-09 PROCEDURE — 0500F INITIAL PRENATAL CARE VISIT: CPT | Performed by: NURSE PRACTITIONER

## 2019-09-09 RX ORDER — PNV NO.95/FERROUS FUM/FOLIC AC 28MG-0.8MG
1 TABLET ORAL DAILY
Qty: 30 TABLET | Refills: 11 | Status: SHIPPED | OUTPATIENT
Start: 2019-09-09 | End: 2020-09-30

## 2019-09-09 NOTE — PATIENT INSTRUCTIONS
folate. ? Dairy products, tofu, canned fish with bones, almonds, broccoli, dark leafy greens, corn tortillas, and fortified orange juice are good sources of calcium. ? Beef, poultry, liver, spinach, lentils, dried beans, fortified cereals, and dried fruits are rich in iron. ? Dark leafy greens, broccoli, asparagus, liver, fortified cereals, orange juice, peanuts, and almonds are good sources of folate. · Avoid foods that could harm your baby. ? Do not eat raw or undercooked meat, chicken, or fish (such as sushi or raw oysters). ? Do not eat raw eggs or foods that contain raw eggs, such as Caesar dressing. ? Do not eat soft cheeses and unpasteurized dairy foods, such as Brie, feta, or blue cheese. ? Do not eat fish that contains a lot of mercury, such as shark, swordfish, tilefish, or milana mackerel. Do not eat more than 6 ounces of tuna each week. ? Do not eat raw sprouts, especially alfalfa sprouts. ? Cut down on caffeine, such as coffee, tea, and cola. Protect yourself and your baby  · Do not touch josh litter or cat feces. They can cause an infection that could harm your baby. · High body temperature can be harmful to your baby. So if you want to use a sauna or hot tub, be sure to talk to your doctor about how to use it safely. [place] with morning sickness  · Sip small amounts of water, juices, or shakes. Try drinking between meals, not with meals. · Eat 5 or 6 small meals a day. Try dry toast or crackers when you first get up, and eat breakfast a little later. · Avoid spicy, greasy, and fatty foods. · When you feel sick, open your windows or go for a short walk to get fresh air. · Try nausea wristbands. These help some women. · Tell your doctor if you think your prenatal vitamins make you sick. Where can you learn more? Go to https://yary.healthNutrabolt. org and sign in to your [employer] account.  Enter G112 in the [employer] box to learn more about \"Weeks 6 to 10 of Your

## 2019-09-09 NOTE — PROGRESS NOTES
Liset Casas is here for a return obstetrical visit. Today she is 7w5d weeks EGA. She is doing well and has no complaints. She  does not have vaginal bleeding, leaking of fluid, contractions. She does not have blurred vision, SOB, or increased swelling in legs or face. Pt does not feel fetal movement regularly. Had hcg quant on , was 16. Sure about LMP and approx 7-8 weeks. Hx of full term  x2. Called in PNV today. Taking Prozac and took this with her last pregnancy. NOB labs pending today and ordered viability u/s. Plan of care was discussed with patient. Patient was encouraged to adhere to a well-balanced diet, including increasing water intake and limiting excessive caffeine and salt. The benefits of exercise were discussed; however she was advised against heavy lifting, sit-ups and abdominal crunches. A list of safe OTC medications was provided and discussed. The patient was cautioned against the use of tanning beds, hot tubs, saunas, and x-rays. Avoidance of tobacco, alcohol and illicit drugs was also discussed due to harmful effects on the fetus and increased risks associated with pregnancy. Certain labs and ultrasounds are required at certain times during pregnancy but others are optional, including the serum integrated screen/[place]/AFP/ [facility], and other genetic testing. The patient was encouraged to attend childbirth classes and general hospital information was provided based on patients hospital of choice. Objective: Mother's Prenatal Vitals  BP: (!) 131/90  Weight: 283 lb (128.4 kg)  Pulse: 72  Patient Position: Sitting  Prenatal Fetal Information  Movement: Absent  Pt is A&Ox3, in no acute distress. Normocephalic, atraumatic. PERRL. Resp even and non-labored. Skin pink, warm & dry. Gravid abdomen. BAH's well. Gait steady. Assessment:  IUP at 7w5d wks      Diagnosis Orders   1.  Supervision of other normal pregnancy  HIV Obstetric Panel    Urine Culture C.trachomatis N.gonorrhoeae DNA    Varicella Zoster Antibody, IgG    Progesterone    HCG, Quantitative, Pregnancy    US OB LESS THAN 14 WEEKS SINGLE OR FIRST GESTATION     Plan:Pt counseled on Genetic testing  Continue with routine prenatal care.   RTC in 2 wk for prenatal visit and viability u/s    MEDICATIONS:  Orders Placed This Encounter   Medications    Prenatal Vit-Fe Fumarate-FA (PRENATAL VITAMINS) 28-0.8 MG TABS     Sig: Take 1 tablet by mouth daily     Dispense:  30 tablet     Refill:  11       ORDERS:  Orders Placed This Encounter   Procedures    Urine Culture    C.trachomatis N.gonorrhoeae DNA    US OB LESS THAN 14 WEEKS SINGLE OR FIRST GESTATION    HIV Obstetric Panel    Varicella Zoster Antibody, IgG    Progesterone    HCG, Quantitative, Pregnancy

## 2019-09-11 LAB
HIV 1,2 COMBO ANTIGEN/ANTIBODY: NEGATIVE
URINE CULTURE, ROUTINE: NORMAL

## 2019-09-12 LAB
APTIMA MEDIA TYPE: NORMAL
BASOPHILS ABSOLUTE: 0 K/UL (ref 0–0.2)
BASOPHILS RELATIVE PERCENT: 0.3 % (ref 0–1)
CHLAMYDIA TRACHOMATIS AMPLIFIED DET: NEGATIVE
EOSINOPHILS ABSOLUTE: 0.1 K/UL (ref 0–0.6)
EOSINOPHILS RELATIVE PERCENT: 1.8 % (ref 0–5)
HCT VFR BLD CALC: 39.1 % (ref 37–47)
HEMOGLOBIN: 12.6 G/DL (ref 12–16)
HEPATITIS B SURFACE ANTIGEN INTERPRETATION: ABNORMAL
IMMATURE GRANULOCYTES #: 0 K/UL
LYMPHOCYTES ABSOLUTE: 2.4 K/UL (ref 1.1–4.5)
LYMPHOCYTES RELATIVE PERCENT: 32.8 % (ref 20–40)
MCH RBC QN AUTO: 29 PG (ref 27–31)
MCHC RBC AUTO-ENTMCNC: 32.2 G/DL (ref 33–37)
MCV RBC AUTO: 89.9 FL (ref 81–99)
MONOCYTES ABSOLUTE: 0.5 K/UL (ref 0–0.9)
MONOCYTES RELATIVE PERCENT: 7.4 % (ref 0–10)
N GONORRHOEAE AMPLIFIED DET: NEGATIVE
NEUTROPHILS ABSOLUTE: 4.2 K/UL (ref 1.5–7.5)
NEUTROPHILS RELATIVE PERCENT: 57.3 % (ref 50–65)
PDW BLD-RTO: 13.1 % (ref 11.5–14.5)
PLATELET # BLD: 284 K/UL (ref 130–400)
PMV BLD AUTO: 11.2 FL (ref 9.4–12.3)
RBC # BLD: 4.35 M/UL (ref 4.2–5.4)
RPR: ABNORMAL
RUBELLA ANTIBODY IGG: REACTIVE
SPECIMEN SOURCE: NORMAL
WBC # BLD: 7.3 K/UL (ref 4.8–10.8)

## 2019-09-16 LAB — VZV IGG SER QL IA: 2.13

## 2019-09-17 ENCOUNTER — HOSPITAL ENCOUNTER (OUTPATIENT)
Dept: ULTRASOUND IMAGING | Age: 29
Discharge: HOME | End: 2019-09-17
Payer: COMMERCIAL

## 2019-09-17 DIAGNOSIS — Z34.80 SUPERVISION OF OTHER NORMAL PREGNANCY: ICD-10-CM

## 2019-09-17 PROCEDURE — 76801 OB US < 14 WKS SINGLE FETUS: CPT

## 2019-10-15 ENCOUNTER — ROUTINE PRENATAL (OUTPATIENT)
Dept: OBGYN | Age: 29
End: 2019-10-15

## 2019-10-15 VITALS
HEART RATE: 79 BPM | BODY MASS INDEX: 43.27 KG/M2 | WEIGHT: 293 LBS | SYSTOLIC BLOOD PRESSURE: 133 MMHG | DIASTOLIC BLOOD PRESSURE: 88 MMHG

## 2019-10-15 DIAGNOSIS — Z3A.12 12 WEEKS GESTATION OF PREGNANCY: Primary | ICD-10-CM

## 2019-10-15 PROCEDURE — 1036F TOBACCO NON-USER: CPT | Performed by: OBSTETRICS & GYNECOLOGY

## 2019-10-15 PROCEDURE — 0502F SUBSEQUENT PRENATAL CARE: CPT | Performed by: OBSTETRICS & GYNECOLOGY

## 2019-10-15 PROCEDURE — G8427 DOCREV CUR MEDS BY ELIG CLIN: HCPCS | Performed by: OBSTETRICS & GYNECOLOGY

## 2019-10-15 PROCEDURE — G8484 FLU IMMUNIZE NO ADMIN: HCPCS | Performed by: OBSTETRICS & GYNECOLOGY

## 2019-10-15 PROCEDURE — G8417 CALC BMI ABV UP PARAM F/U: HCPCS | Performed by: OBSTETRICS & GYNECOLOGY

## 2019-11-12 ENCOUNTER — ROUTINE PRENATAL (OUTPATIENT)
Dept: OBGYN | Age: 29
End: 2019-11-12

## 2019-11-12 VITALS
WEIGHT: 293 LBS | BODY MASS INDEX: 43.86 KG/M2 | DIASTOLIC BLOOD PRESSURE: 78 MMHG | HEART RATE: 80 BPM | SYSTOLIC BLOOD PRESSURE: 130 MMHG

## 2019-11-12 DIAGNOSIS — O09.292 HISTORY OF MISCARRIAGE, CURRENTLY PREGNANT, SECOND TRIMESTER: Primary | ICD-10-CM

## 2019-11-12 PROCEDURE — G8484 FLU IMMUNIZE NO ADMIN: HCPCS | Performed by: OBSTETRICS & GYNECOLOGY

## 2019-11-12 PROCEDURE — 0502F SUBSEQUENT PRENATAL CARE: CPT | Performed by: OBSTETRICS & GYNECOLOGY

## 2019-11-12 PROCEDURE — G8427 DOCREV CUR MEDS BY ELIG CLIN: HCPCS | Performed by: OBSTETRICS & GYNECOLOGY

## 2019-11-12 PROCEDURE — G8417 CALC BMI ABV UP PARAM F/U: HCPCS | Performed by: OBSTETRICS & GYNECOLOGY

## 2019-11-12 PROCEDURE — 1036F TOBACCO NON-USER: CPT | Performed by: OBSTETRICS & GYNECOLOGY

## 2020-01-08 ENCOUNTER — ROUTINE PRENATAL (OUTPATIENT)
Dept: OBGYN | Age: 30
End: 2020-01-08
Payer: COMMERCIAL

## 2020-01-08 VITALS
BODY MASS INDEX: 47.4 KG/M2 | SYSTOLIC BLOOD PRESSURE: 136 MMHG | DIASTOLIC BLOOD PRESSURE: 78 MMHG | HEART RATE: 98 BPM | WEIGHT: 293 LBS

## 2020-01-08 PROCEDURE — 0502F SUBSEQUENT PRENATAL CARE: CPT | Performed by: OBSTETRICS & GYNECOLOGY

## 2020-01-08 PROCEDURE — 90686 IIV4 VACC NO PRSV 0.5 ML IM: CPT | Performed by: OBSTETRICS & GYNECOLOGY

## 2020-01-08 PROCEDURE — 90471 IMMUNIZATION ADMIN: CPT | Performed by: OBSTETRICS & GYNECOLOGY

## 2020-01-08 NOTE — PROGRESS NOTES
After obtaining consent, and per orders of Dr Bettie Chong, injection of afluria given in Left deltoid by Jill Loaiza. Patient instructed to remain in clinic for 20 minutes afterwards, and to report any adverse reaction to me immediately.

## 2020-02-05 ENCOUNTER — HOSPITAL ENCOUNTER (OUTPATIENT)
Age: 30
Discharge: HOME | End: 2020-02-05
Attending: OBSTETRICS & GYNECOLOGY | Admitting: OBSTETRICS & GYNECOLOGY
Payer: COMMERCIAL

## 2020-02-05 ENCOUNTER — ROUTINE PRENATAL (OUTPATIENT)
Dept: OBGYN | Age: 30
End: 2020-02-05

## 2020-02-05 VITALS
SYSTOLIC BLOOD PRESSURE: 138 MMHG | BODY MASS INDEX: 47.85 KG/M2 | HEART RATE: 99 BPM | DIASTOLIC BLOOD PRESSURE: 81 MMHG | WEIGHT: 293 LBS

## 2020-02-05 DIAGNOSIS — O26.899 RH NEGATIVE STATE IN ANTEPARTUM PERIOD: ICD-10-CM

## 2020-02-05 DIAGNOSIS — Z67.91 RH NEGATIVE STATE IN ANTEPARTUM PERIOD: ICD-10-CM

## 2020-02-05 DIAGNOSIS — O09.292 HISTORY OF MISCARRIAGE, CURRENTLY PREGNANT, SECOND TRIMESTER: ICD-10-CM

## 2020-02-05 LAB
ABO/RH: NORMAL
ANTIBODY SCREEN: NORMAL
GLUCOSE, 1HR PP: 93 MG/DL (ref 75–140)
HCT VFR BLD CALC: 32.8 % (ref 37–47)
HEMOGLOBIN: 10.7 G/DL (ref 12–16)
MCH RBC QN AUTO: 30.3 PG (ref 27–31)
MCHC RBC AUTO-ENTMCNC: 32.6 G/DL (ref 33–37)
MCV RBC AUTO: 92.9 FL (ref 81–99)
PDW BLD-RTO: 13.8 % (ref 11.5–14.5)
PLATELET # BLD: 238 K/UL (ref 130–400)
PMV BLD AUTO: 11.8 FL (ref 9.4–12.3)
RBC # BLD: 3.53 M/UL (ref 4.2–5.4)
RHIG LOT NUMBER: NORMAL
WBC # BLD: 10.5 K/UL (ref 4.8–10.8)

## 2020-02-05 PROCEDURE — 96372 THER/PROPH/DIAG INJ SC/IM: CPT

## 2020-02-05 PROCEDURE — 0502F SUBSEQUENT PRENATAL CARE: CPT | Performed by: OBSTETRICS & GYNECOLOGY

## 2020-02-05 PROCEDURE — 6360000002 HC RX W HCPCS: Performed by: OBSTETRICS & GYNECOLOGY

## 2020-02-05 PROCEDURE — 99211 OFF/OP EST MAY X REQ PHY/QHP: CPT

## 2020-02-05 RX ADMIN — HUMAN RHO(D) IMMUNE GLOBULIN 300 MCG: 300 INJECTION, SOLUTION INTRAMUSCULAR at 18:25

## 2020-02-05 NOTE — PROGRESS NOTES
Pt presents today for a routine prenatal visit. Pt denies vaginal bleeding, leaking of fluid or cramping. Pt states + fetal movement.   1hr GTT done today

## 2020-02-05 NOTE — PATIENT INSTRUCTIONS
Patient Education        Weeks 26 to 30 of Your Pregnancy: Care Instructions  Your Care Instructions    You are now in your last trimester of pregnancy. Your baby is growing rapidly. And you'll probably feel your baby moving around more often. Your doctor may ask you to count your baby's kicks. Your back may ache as your body gets used to your baby's size and length. If you haven't already had the Tdap shot during this pregnancy, talk to your doctor about getting it. It will help protect your  against pertussis infection. During this time, it's important to take care of yourself and pay attention to what your body needs. If you feel sexual, explore ways to be close with your partner that match your comfort and desire. Use the tips provided in this care sheet to find ways to be sexual in your own way. Follow-up care is a key part of your treatment and safety. Be sure to make and go to all appointments, and call your doctor if you are having problems. It's also a good idea to know your test results and keep a list of the medicines you take. How can you care for yourself at home? Take it easy at work  · Take frequent breaks. If possible, stop working when you are tired, and rest during your lunch hour. · Take bathroom breaks every 2 hours. · Change positions often. If you sit for long periods, stand up and walk around. · When you stand for a long time, keep one foot on a low stool with your knee bent. After standing a lot, sit with your feet up. · Avoid fumes, chemicals, and tobacco smoke. Be sexual in your own way  · Having sex during pregnancy is okay, unless your doctor tells you not to. · You may be very interested in sex, or you may have no interest at all. · Your growing belly can make it hard to find a good position during intercourse. [place] and explore. · You may get cramps in your uterus when your partner touches your breasts.   · A back rub may relieve the backache or cramps that sometimes follow orgasm. Learn about  labor  · Watch for signs of  labor. You may be going into labor if:  ? You have menstrual-like cramps, with or without nausea. ? You have about 6 or more contractions in 1 hour, even after you have had a glass of water and are resting. ? You have a low, dull backache that does not go away when you change your position. ? You have pain or pressure in your pelvis that comes and goes in a pattern. ? You have intestinal cramping or flu-like symptoms, with or without diarrhea.  ? You notice an increase or change in your vaginal discharge. Discharge may be heavy, mucus-like, watery, or streaked with blood. ? Your water breaks. · If you think you have  labor:  ? Drink 2 or 3 glasses of water or juice. Not drinking enough fluids can cause contractions. ? Stop what you are doing, and empty your bladder. Then lie down on your left side for at least 1 hour. ? While lying on your side, find your breast bone. Put your fingers in the soft spot just below it. Move your fingers down toward your belly button to find the top of your uterus. Check to see if it is tight. ? Contractions can be weak or strong. Record your contractions for an hour. Time a contraction from the start of one contraction to the start of the next one.  ? Single or several strong contractions without a pattern are called [place]-Yun contractions. They are practice contractions but not the start of labor. They often stop if you change what you are doing. ? Call your doctor if you have regular contractions. Where can you learn more? Go to https://Momentum Energyricardo.Vendobots. org and sign in to your [employer] account. Enter K002 in the [facility] box to learn more about \"Weeks 26 to 30 of Your Pregnancy: Care Instructions. \"     If you do not have an account, please click on the \"Sign Up Now\" link.   Current as of: May 29, 2019  Content Version: 12.3  © 9901-3957 Healthwise,

## 2020-02-19 ENCOUNTER — ROUTINE PRENATAL (OUTPATIENT)
Dept: OBGYN | Age: 30
End: 2020-02-19

## 2020-02-19 VITALS
HEART RATE: 88 BPM | DIASTOLIC BLOOD PRESSURE: 80 MMHG | WEIGHT: 293 LBS | SYSTOLIC BLOOD PRESSURE: 134 MMHG | BODY MASS INDEX: 47.11 KG/M2

## 2020-02-19 PROCEDURE — 0502F SUBSEQUENT PRENATAL CARE: CPT | Performed by: OBSTETRICS & GYNECOLOGY

## 2020-02-19 NOTE — PROGRESS NOTES
Pt presents today for a routine prenatal visit. Pt denies vaginal bleeding, leaking of fluid or cramping. Pt states + fetal movement.   3D U/S done today, repeat at next visit

## 2020-03-05 ENCOUNTER — ROUTINE PRENATAL (OUTPATIENT)
Dept: OBGYN | Age: 30
End: 2020-03-05

## 2020-03-05 VITALS
BODY MASS INDEX: 47.4 KG/M2 | SYSTOLIC BLOOD PRESSURE: 121 MMHG | DIASTOLIC BLOOD PRESSURE: 78 MMHG | WEIGHT: 293 LBS | HEART RATE: 86 BPM

## 2020-03-05 PROCEDURE — 0502F SUBSEQUENT PRENATAL CARE: CPT | Performed by: NURSE PRACTITIONER

## 2020-03-20 ENCOUNTER — ROUTINE PRENATAL (OUTPATIENT)
Dept: OBGYN | Age: 30
End: 2020-03-20

## 2020-03-20 VITALS
HEART RATE: 88 BPM | DIASTOLIC BLOOD PRESSURE: 81 MMHG | WEIGHT: 293 LBS | SYSTOLIC BLOOD PRESSURE: 140 MMHG | BODY MASS INDEX: 47.99 KG/M2

## 2020-03-20 PROCEDURE — 0502F SUBSEQUENT PRENATAL CARE: CPT | Performed by: OBSTETRICS & GYNECOLOGY

## 2020-03-20 NOTE — PROGRESS NOTES
Subjective:  Mayank Chacko is here for a return obstetrical visit. Today she is 32w5d weeks EGA. She is doing well, taking her PNV as directed, and has no complaints. She  does not have vaginal bleeding, leaking, contractions, syncope, or headaches. Pt does feel fetal movement regularly. Did not go to 32 week growth scan with MFM, did not know about it. Objective: Mother's Prenatal Vitals  BP: (!) 140/81  Weight: (!) 325 lb (147.4 kg)  Pulse: 88  Patient Position: Sitting  Alb/Glu  Albumin: 1+  Glucose: Negative  Prenatal Fetal Information  Fetal Heart Rate: 142/US  Movement: Present  Pt is A&Ox3, in no acute distress. Normocephalic, atraumatic. PERRL. Resp even and non-labored. Skin pink, warm & dry. Gravid abdomen. BAH's well. Gait steady. Assessment:    IUP at 32w5d wks      Diagnosis Orders   1. History of miscarriage, currently pregnant, third trimester  Tetanus-Diphth-Acell Pertussis (BOOSTRIX) injection 0.5 mL   2. Arnold-Chiari malformation (HCC)     3. Obesity affecting pregnancy in third trimester     4. Need for Tdap vaccination  Tetanus-Diphth-Acell Pertussis (BOOSTRIX) injection 0.5 mL     Plan:  Pt counseled on balanced nutrition, adequate fluid intake, taking PNV daily, and exercise along with GHTN precautions, Kick count and  labor  Continue with routine prenatal care. Does not have to repeat growth scan at this time. tdap vaccine today   surveillance not indicated  RTC in 2 wks for prenatal visit    MEDICATIONS:  Orders Placed This Encounter   Medications    Tetanus-Diphth-Acell Pertussis (BOOSTRIX) injection 0.5 mL     ORDERS:  No orders of the defined types were placed in this encounter.

## 2020-03-20 NOTE — PROGRESS NOTES
Pt presents today for a routine prenatal visit. Pt denies vaginal bleeding, leaking of fluid or cramping. Pt states + fetal movement,  After obtaining consent, and per orders of Dr. Altagracia Garcia, injection of Tdap given in Right deltoid by Suzy Platt. Patient instructed to remain in clinic for 20 minutes afterwards, and to report any adverse reaction to me immediately.

## 2020-04-01 ENCOUNTER — ROUTINE PRENATAL (OUTPATIENT)
Dept: OBGYN | Age: 30
End: 2020-04-01

## 2020-04-01 VITALS
DIASTOLIC BLOOD PRESSURE: 92 MMHG | HEART RATE: 90 BPM | SYSTOLIC BLOOD PRESSURE: 142 MMHG | BODY MASS INDEX: 47.55 KG/M2 | WEIGHT: 293 LBS

## 2020-04-01 PROCEDURE — 0502F SUBSEQUENT PRENATAL CARE: CPT | Performed by: OBSTETRICS & GYNECOLOGY

## 2020-04-01 NOTE — PATIENT INSTRUCTIONS
Patient Education        Weeks 34 to 39 of Your Pregnancy: Care Instructions  Your Care Instructions    By now, your baby and your belly have grown quite large. It is almost time to give birth. Your baby's lungs are almost ready to breathe air. The bones in your baby's head are now firm enough to protect it, but soft enough to move down through the birth canal.  You may feel excited, happy, anxious, or scared. You may wonder how you will know if you are in labor or what to expect during labor. Try to be flexible in your expectations of the birth. Because each birth is different, there is no way to know exactly what childbirth will be like for you. This care sheet will help you know what to expect and how to prepare. This may make your childbirth easier. If you haven't already had the Tdap shot during this pregnancy, talk to your doctor about getting it. It will help protect your  against pertussis infection. In the 36th week, most women have a test for group B streptococcus (GBS). GBS is a common bacteria that can live in the vagina and rectum. It can make your baby sick after birth. If you test positive, you will get antibiotics during labor. The medicine will keep your baby from getting the bacteria. Follow-up care is a key part of your treatment and safety. Be sure to make and go to all appointments, and call your doctor if you are having problems. It's also a good idea to know your test results and keep a list of the medicines you take. How can you care for yourself at home? Learn about pain relief choices  · Pain is different for every woman. Talk with your doctor about your feelings about pain. · You can choose from several types of pain relief. These include medicine or breathing techniques, as well as comfort measures. You can use more than one option. · If you choose to have pain medicine during labor, talk to your doctor about your options.  Some medicines lower anxiety and help with some of you learn more? Go to https://chpepiceweb.[email or web address] org and sign in to your [employer] account. Enter Z677 in the [facility] box to learn more about \"Weeks 34 to 36 of Your Pregnancy: Care Instructions. \"     If you do not have an account, please click on the \"Sign Up Now\" link. Current as of: May 29, 2019Content Version: 12.4  © 2122-1349 Healthwise, Incorporated. Care instructions adapted under license by [facility] ([facility]). If you have questions about a medical condition or this instruction, always ask your healthcare professional. [address] any warranty or liability for your use of this information.

## 2020-04-01 NOTE — PROGRESS NOTES
I, Lorie Sullivan RN, am scribing for and in the presence of Dr. eKeley Gamez 2020/2:00 PM/sign      Subjective:  Malcom Wilder is here for a return obstetrical visit. Today she is 34w3d weeks EGA. She  does not have vaginal bleeding, leaking, contractions, or syncope,  Pt does feel fetal movement regularly. She has been having headaches and swelling to legs and hands. Objective: Mother's Prenatal Vitals  BP: (!) 140/81  Weight: (!) 322 lb (146.1 kg)  Pulse: 90  Patient Position: Sitting  Alb/Glu  Albumin: 1+  Glucose: Negative  Prenatal Fetal Information  Fetal Heart Rate: 161/US  Movement: Present  Pt is A&Ox3, in no acute distress. Normocephalic, atraumatic. PERRL. Resp even and non-labored. Skin pink, warm & dry. Gravid abdomen. BAH's well. Gait steady. Assessment:    IUP at 34w3d wks      Diagnosis Orders   1. History of miscarriage, currently pregnant, third trimester     2. Arnold-Chiari malformation (HCC)     3. Obesity affecting pregnancy in third trimester       Plan:  Pt counseled on balanced nutrition, adequate fluid intake, taking PNV daily, and exercise along with GHTN precautions, Kick count and  labor  Continue with routine prenatal care.  surveillance not indicated at this time  Will complete a 24 hour urine. Pt will start bedrest for PIH. RTC in 2 wks for prenatal visit with GBS    MEDICATIONS:  No orders of the defined types were placed in this encounter. ORDERS:  No orders of the defined types were placed in this encounter. Marine Jarrett MD, personally performed services described in this document as scribed by Verenice Marinelli RN in my presence, and it is both accurate and complete.

## 2020-04-02 DIAGNOSIS — O16.3 ELEVATED BLOOD PRESSURE AFFECTING PREGNANCY IN THIRD TRIMESTER, ANTEPARTUM: ICD-10-CM

## 2020-04-02 LAB
24HR URINE VOLUME (ML): 1650 ML
CREATININE 24 HOUR URINE: 1.8 G/24HR (ref 1–2)
PROTEIN 24 HOUR URINE: 281 MG/24HR (ref 50–100)

## 2020-04-07 ENCOUNTER — ROUTINE PRENATAL (OUTPATIENT)
Dept: OBGYN | Age: 30
End: 2020-04-07

## 2020-04-07 VITALS — SYSTOLIC BLOOD PRESSURE: 136 MMHG | DIASTOLIC BLOOD PRESSURE: 90 MMHG | WEIGHT: 293 LBS | BODY MASS INDEX: 47.26 KG/M2

## 2020-04-07 PROCEDURE — 0502F SUBSEQUENT PRENATAL CARE: CPT | Performed by: NURSE PRACTITIONER

## 2020-04-07 NOTE — PATIENT INSTRUCTIONS
the pain. Others make your lower body numb so that you won't feel pain. · Be sure to tell your doctor about your pain medicine choice before you start labor or very early in your labor. You may be able to change your mind as labor progresses. · Rarely, a woman is put to sleep by medicine given through a mask or an IV. Labor and delivery  · The first stage of labor has three parts: early, active, and transition. ? Most women have early labor at home. You can stay busy or rest, eat light snacks, drink clear fluids, and start counting contractions. ? When talking during a contraction gets hard, you may be moving to active labor. During active labor, you should head for the hospital if you are not there already. ? You are in active labor when contractions come every 3 to 4 minutes and last about 60 seconds. Your cervix is opening more rapidly. ? If your water breaks, contractions will come faster and stronger. ? During transition, your cervix is stretching, and contractions are coming more rapidly. ? You may want to push, but your cervix might not be ready. Your doctor will tell you when to push. · The second stage starts when your cervix is completely opened and you are ready to push. ? Contractions are very strong to push the baby down the birth canal.  ? You will feel the urge to push. You may feel like you need to have a bowel movement. ? You may be coached to push with contractions. These contractions will be very strong, but you will not have them as often. You can get a little rest between contractions. ? You may be emotional and irritable. You may not be aware of what is going on around you.  ? One last push, and your baby is born. · The third stage is when a few more contractions push out the placenta. This may take 30 minutes or less. · The fourth stage is the welcome recovery. You may feel overwhelmed with emotions and exhausted but alert. This is a good time to start breastfeeding.   Where can you learn more? Go to https://chpepiceweb.health[facility] [email or web address]. org and sign in to your [address] account. Enter F169 in the [facility] box to learn more about \"Weeks 34 to 36 of Your Pregnancy: Care Instructions. \"     If you do not have an account, please click on the \"Sign Up Now\" link. Current as of: May 29, 2019Content Version: 12.4  © 7120-1004 Healthwise, Incorporated. Care instructions adapted under license by [facility] [place] ([facility]). If you have questions about a medical condition or this instruction, always ask your healthcare professional. [address] any warranty or liability for your use of this information.

## 2020-04-07 NOTE — PROGRESS NOTES
Pt presents today for routine prenatal visit. Pt denies vaginal bleeding, cramping, or leaking of fluid +fetal movement. Re Beckman is here for a return obstetrical visit. Today she is 35w2d weeks EGA. She is doing well and has no complaints. 24hr urine was 281. Has been on bedrest at home. Swelling has stopped.  works in [employer]. She  does not have vaginal bleeding, leaking of fluid, contractions. She does not have blurred vision, SOB, or increased swelling in legs or face. Pt does feel fetal movement regularly. O:   GBS collected  Vitals:    20 1417   BP: (!) 136/90   Weight: (!) 320 lb (145.2 kg)     Pt is A&Ox3, in no acute distress. Normocephalic, atraumatic. PERRL. Resp even and non-labored. Skin pink, warm & dry. Gravid abdomen. BAH's well. Gait steady. See OB flowsheet. A: Normal IUP at 35w2d wks      Diagnosis Orders   1. 35 weeks gestation of pregnancy  Culture, Strep B Screen, Vaginal/Rectal   2. Arnold-Chiari malformation ([facility] [address]     3. History of miscarriage, currently pregnant, third trimester     4. Obesity affecting pregnancy in third trimester     5. Elevated blood pressure affecting pregnancy in third trimester, antepartum         P:   Pt counseled on PIH precautions, Kick count and  labor  Continue with routine prenatal care. RTC in 1 wk for prenatal visit,   Check bp's daily, call for elevated readings and will need repeat 24hr urine  Continue bedrest    MEDICATIONS:  No orders of the defined types were placed in this encounter.       ORDERS:  Orders Placed This Encounter   Procedures    Culture, Strep B Screen, Vaginal/Rectal

## 2020-04-08 ENCOUNTER — HOSPITAL ENCOUNTER (OUTPATIENT)
Age: 30
Discharge: HOME | End: 2020-04-08
Attending: OBSTETRICS & GYNECOLOGY | Admitting: OBSTETRICS & GYNECOLOGY
Payer: COMMERCIAL

## 2020-04-08 ENCOUNTER — TELEPHONE (OUTPATIENT)
Dept: OBGYN | Age: 30
End: 2020-04-08

## 2020-04-08 VITALS — HEART RATE: 91 BPM | DIASTOLIC BLOOD PRESSURE: 71 MMHG | SYSTOLIC BLOOD PRESSURE: 125 MMHG

## 2020-04-08 LAB
ALBUMIN SERPL-MCNC: 3.4 G/DL (ref 3.5–5.2)
ALP BLD-CCNC: 77 U/L (ref 35–104)
ALT SERPL-CCNC: 8 U/L (ref 5–33)
ANION GAP SERPL CALCULATED.3IONS-SCNC: 13 MMOL/L (ref 7–19)
AST SERPL-CCNC: 11 U/L (ref 5–32)
BACTERIA: ABNORMAL /HPF
BASOPHILS ABSOLUTE: 0 K/UL (ref 0–0.2)
BASOPHILS RELATIVE PERCENT: 0.3 % (ref 0–1)
BILIRUB SERPL-MCNC: 0.3 MG/DL (ref 0.2–1.2)
BILIRUBIN URINE: ABNORMAL
BLOOD, URINE: NEGATIVE
BUN BLDV-MCNC: 10 MG/DL (ref 6–20)
CALCIUM SERPL-MCNC: 9 MG/DL (ref 8.6–10)
CHLORIDE BLD-SCNC: 105 MMOL/L (ref 98–111)
CLARITY: ABNORMAL
CO2: 19 MMOL/L (ref 22–29)
COLOR: ABNORMAL
CREAT SERPL-MCNC: 0.5 MG/DL (ref 0.5–0.9)
CREATININE URINE: 298.3 MG/DL (ref 4.2–622)
EOSINOPHILS ABSOLUTE: 0.1 K/UL (ref 0–0.6)
EOSINOPHILS RELATIVE PERCENT: 1 % (ref 0–5)
EPITHELIAL CELLS, UA: ABNORMAL /HPF
GFR NON-AFRICAN AMERICAN: >60
GLUCOSE BLD-MCNC: 103 MG/DL (ref 74–109)
GLUCOSE URINE: NEGATIVE MG/DL
HCT VFR BLD CALC: 34 % (ref 37–47)
HEMOGLOBIN: 11.1 G/DL (ref 12–16)
IMMATURE GRANULOCYTES #: 0.1 K/UL
KETONES, URINE: ABNORMAL MG/DL
LEUKOCYTE ESTERASE, URINE: ABNORMAL
LYMPHOCYTES ABSOLUTE: 1.8 K/UL (ref 1.1–4.5)
LYMPHOCYTES RELATIVE PERCENT: 17.9 % (ref 20–40)
MCH RBC QN AUTO: 30.4 PG (ref 27–31)
MCHC RBC AUTO-ENTMCNC: 32.6 G/DL (ref 33–37)
MCV RBC AUTO: 93.2 FL (ref 81–99)
MONOCYTES ABSOLUTE: 0.6 K/UL (ref 0–0.9)
MONOCYTES RELATIVE PERCENT: 5.9 % (ref 0–10)
NEUTROPHILS ABSOLUTE: 7.3 K/UL (ref 1.5–7.5)
NEUTROPHILS RELATIVE PERCENT: 73.5 % (ref 50–65)
NITRITE, URINE: NEGATIVE
PDW BLD-RTO: 13.8 % (ref 11.5–14.5)
PH UA: 6 (ref 5–8)
PLATELET # BLD: 236 K/UL (ref 130–400)
PMV BLD AUTO: 11.9 FL (ref 9.4–12.3)
POTASSIUM SERPL-SCNC: 3.8 MMOL/L (ref 3.5–5)
PROTEIN PROTEIN: 46 MG/DL (ref 15–45)
PROTEIN UA: ABNORMAL MG/DL
RBC # BLD: 3.65 M/UL (ref 4.2–5.4)
RBC UA: ABNORMAL /HPF (ref 0–2)
SODIUM BLD-SCNC: 137 MMOL/L (ref 136–145)
SPECIFIC GRAVITY UA: 1.03 (ref 1–1.03)
TOTAL PROTEIN: 6.3 G/DL (ref 6.6–8.7)
UROBILINOGEN, URINE: 1 E.U./DL
WBC # BLD: 9.9 K/UL (ref 4.8–10.8)
WBC UA: ABNORMAL /HPF (ref 0–5)

## 2020-04-08 PROCEDURE — 36415 COLL VENOUS BLD VENIPUNCTURE: CPT

## 2020-04-08 PROCEDURE — 80053 COMPREHEN METABOLIC PANEL: CPT

## 2020-04-08 PROCEDURE — 6370000000 HC RX 637 (ALT 250 FOR IP): Performed by: OBSTETRICS & GYNECOLOGY

## 2020-04-08 PROCEDURE — 82570 ASSAY OF URINE CREATININE: CPT

## 2020-04-08 PROCEDURE — 99211 OFF/OP EST MAY X REQ PHY/QHP: CPT

## 2020-04-08 PROCEDURE — 85025 COMPLETE CBC W/AUTO DIFF WBC: CPT

## 2020-04-08 PROCEDURE — 81001 URINALYSIS AUTO W/SCOPE: CPT

## 2020-04-08 PROCEDURE — 84156 ASSAY OF PROTEIN URINE: CPT

## 2020-04-08 RX ORDER — ONDANSETRON 2 MG/ML
4 INJECTION INTRAMUSCULAR; INTRAVENOUS EVERY 6 HOURS PRN
Status: DISCONTINUED | OUTPATIENT
Start: 2020-04-08 | End: 2020-04-08 | Stop reason: HOSPADM

## 2020-04-08 RX ORDER — ACETAMINOPHEN 325 MG/1
650 TABLET ORAL EVERY 4 HOURS PRN
Status: DISCONTINUED | OUTPATIENT
Start: 2020-04-08 | End: 2020-04-08

## 2020-04-08 RX ORDER — PROMETHAZINE HYDROCHLORIDE 25 MG/1
12.5 TABLET ORAL EVERY 6 HOURS PRN
Status: DISCONTINUED | OUTPATIENT
Start: 2020-04-08 | End: 2020-04-08 | Stop reason: HOSPADM

## 2020-04-08 RX ORDER — SODIUM CHLORIDE 0.9 % (FLUSH) 0.9 %
10 SYRINGE (ML) INJECTION PRN
Status: DISCONTINUED | OUTPATIENT
Start: 2020-04-08 | End: 2020-04-08 | Stop reason: HOSPADM

## 2020-04-08 RX ORDER — ACETAMINOPHEN 500 MG
1000 TABLET ORAL EVERY 6 HOURS PRN
Status: DISCONTINUED | OUTPATIENT
Start: 2020-04-08 | End: 2020-04-08 | Stop reason: HOSPADM

## 2020-04-08 RX ORDER — SODIUM CHLORIDE 0.9 % (FLUSH) 0.9 %
10 SYRINGE (ML) INJECTION EVERY 12 HOURS SCHEDULED
Status: DISCONTINUED | OUTPATIENT
Start: 2020-04-08 | End: 2020-04-08 | Stop reason: HOSPADM

## 2020-04-08 RX ADMIN — ACETAMINOPHEN 1000 MG: 500 TABLET, FILM COATED ORAL at 17:42

## 2020-04-08 ASSESSMENT — PAIN SCALES - GENERAL: PAINLEVEL_OUTOF10: 6

## 2020-04-11 LAB
GROUP B STREP CULTURE: ABNORMAL
GROUP B STREP CULTURE: ABNORMAL
ORGANISM: ABNORMAL

## 2020-04-17 ENCOUNTER — ROUTINE PRENATAL (OUTPATIENT)
Dept: OBGYN | Age: 30
End: 2020-04-17
Payer: COMMERCIAL

## 2020-04-17 VITALS
WEIGHT: 293 LBS | HEART RATE: 130 BPM | SYSTOLIC BLOOD PRESSURE: 135 MMHG | BODY MASS INDEX: 47.85 KG/M2 | DIASTOLIC BLOOD PRESSURE: 80 MMHG

## 2020-04-17 PROCEDURE — 99213 OFFICE O/P EST LOW 20 MIN: CPT | Performed by: OBSTETRICS & GYNECOLOGY

## 2020-04-23 ENCOUNTER — ROUTINE PRENATAL (OUTPATIENT)
Dept: OBGYN | Age: 30
End: 2020-04-23

## 2020-04-23 VITALS
WEIGHT: 293 LBS | BODY MASS INDEX: 47.99 KG/M2 | DIASTOLIC BLOOD PRESSURE: 81 MMHG | SYSTOLIC BLOOD PRESSURE: 137 MMHG | HEART RATE: 123 BPM

## 2020-04-23 PROCEDURE — 0502F SUBSEQUENT PRENATAL CARE: CPT | Performed by: OBSTETRICS & GYNECOLOGY

## 2020-04-23 NOTE — PATIENT INSTRUCTIONS
Patient Education        Week 37 of Your Pregnancy: Care Instructions  Your Care Instructions    You are near the end of your pregnancy--and you're probably pretty uncomfortable. It may be harder to walk around. Lying down probably isn't comfortable either. You may have trouble getting to sleep or staying asleep. Most women deliver their babies between 40 and 41 weeks. This is a good time to think about packing a bag for the hospital with items you'll need. Then you'll be ready when labor starts. Follow-up care is a key part of your treatment and safety. Be sure to make and go to all appointments, and call your doctor if you are having problems. It's also a good idea to know your test results and keep a list of the medicines you take. How can you care for yourself at home? Learn about breastfeeding  · Breastfeeding is best for your baby and good for you. · Breast milk has antibodies to help your baby fight infections. · Mothers who breastfeed often lose weight faster, because making milk burns calories. · Learning the best ways to hold your baby will make breastfeeding easier. · Let your partner bathe and diaper the baby to keep your partner from feeling left out. Snuggle together when you breastfeed. · You may want to learn how to use a breast pump and store your milk. · If you choose to bottle feed, make the feeding feel like breastfeeding so you can bond with your baby. Always hold your baby and the bottle. Do not prop bottles or let your baby fall asleep with a bottle. Learn about crying  · It is common for babies to cry for 1 to 3 hours a day. Some cry more, some cry less. · Babies don't cry to make you upset or because you are a bad parent. · Crying is how your baby communicates. Your baby may be hungry; have gas; need a diaper change; or feel cold, warm, tired, lonely, or tense. Sometimes babies cry for unknown reasons.   · If you respond to your baby's needs, he or she will learn to trust you.  · Try to stay calm when your baby cries. Your baby may get more upset if he or she senses that you are upset. Know how to care for your   · Your baby's umbilical cord stump will drop off on its own, usually between 1 and 2 weeks. To care for your baby's umbilical cord area:  ? Clean the area at the bottom of the cord 2 or 3 times a day. ? Pay special attention to the area where the cord attaches to the skin. ? Keep the diaper folded below the cord. ? Use a damp washcloth or cotton ball to sponge bathe your baby until the stump has come off. · Your baby's first dark stool is called meconium. After the meconium is passed, your baby will develop his or her own bowel pattern. ? Some babies, especially  babies, have several bowel movements a day. Others have one or two a day, or one every 2 to 3 days. ?  babies often have loose, yellow stools. Formula-fed babies have more formed stools. ? If your baby's stools look like little pellets, he or she is constipated. After 2 days of constipation, call your baby's doctor. · If your baby will be circumcised, you can care for him at home. ? Gently rinse his penis with warm water after every diaper change. Do not try to remove the film that forms on the penis. This film will go away on its own. Pat dry. ? Put petroleum ointment, such as Vaseline, on the area of the diaper that will touch your baby's penis. This will keep the diaper from sticking to your baby. ? Ask the doctor about giving your baby acetaminophen (Tylenol) for pain. Where can you learn more? Go to https://chraneb.health[facility]us[facility]ap. org and sign in to your [employer] account. Enter 68 [phone] [id number] in the [employer] box to learn more about \"Week 37 of Your Pregnancy: Care Instructions. \"     If you do not have an account, please click on the \"Sign Up Now\" link. Current as of: May 29, 2019Content Version: 12.4  © 6480-9145 Healthwise, Incorporated.   Care instructions

## 2020-04-23 NOTE — PROGRESS NOTES
Subjective:  Mikael Ibanez is here for a return obstetrical visit. Today she is 37w4d weeks EGA. She is doing well, taking her PNV as directed, and has no complaints. She  does not have vaginal bleeding, leaking, contractions, syncope, or headaches. Pt does feel fetal movement regularly. Objective: Mother's Prenatal Vitals  BP: 137/81  Weight: (!) 325 lb (147.4 kg)  Pulse: 123  Patient Position: Sitting  Alb/Glu  Albumin: 1+  Glucose: Negative  Prenatal Fetal Information  Fetal Heart Rate: 150/US  Movement: Present  Pt is A&Ox3, in no acute distress. Normocephalic, atraumatic. PERRL. Resp even and non-labored. Skin pink, warm & dry. Gravid abdomen. BAH's well. Gait steady. Assessment:    IUP at 37w4d wks    Plan:  Pt counseled on balanced nutrition, adequate fluid intake, taking PNV daily, and exercise along with labor precautions, GHTN precautions, Kick count,  labor and Genetic testing  Continue with routine prenatal care.  surveillance not indicated  RTC in 1 wks for prenatal visit  IOL scheduled at 39 weeks    MEDICATIONS:  No orders of the defined types were placed in this encounter. ORDERS:  No orders of the defined types were placed in this encounter.

## 2020-05-03 ENCOUNTER — HOSPITAL ENCOUNTER (INPATIENT)
Age: 30
LOS: 2 days | Discharge: HOME | End: 2020-05-05
Attending: OBSTETRICS & GYNECOLOGY | Admitting: OBSTETRICS & GYNECOLOGY
Payer: COMMERCIAL

## 2020-05-03 ENCOUNTER — APPOINTMENT (OUTPATIENT)
Dept: LABOR AND DELIVERY | Age: 30
End: 2020-05-03
Payer: COMMERCIAL

## 2020-05-03 LAB
ABO/RH: NORMAL
AMPHETAMINE SCREEN, URINE: NEGATIVE
ANTIBODY SCREEN: NORMAL
BARBITURATE SCREEN URINE: NEGATIVE
BENZODIAZEPINE SCREEN, URINE: NEGATIVE
CANNABINOID SCREEN URINE: NEGATIVE
COCAINE METABOLITE SCREEN URINE: NEGATIVE
HCT VFR BLD CALC: 33 % (ref 37–47)
HEMOGLOBIN: 11.3 G/DL (ref 12–16)
Lab: NORMAL
MCH RBC QN AUTO: 31 PG (ref 27–31)
MCHC RBC AUTO-ENTMCNC: 34.2 G/DL (ref 33–37)
MCV RBC AUTO: 90.7 FL (ref 81–99)
OPIATE SCREEN URINE: NEGATIVE
PDW BLD-RTO: 14 % (ref 11.5–14.5)
PLATELET # BLD: 231 K/UL (ref 130–400)
PMV BLD AUTO: 12 FL (ref 9.4–12.3)
RBC # BLD: 3.64 M/UL (ref 4.2–5.4)
WBC # BLD: 11.4 K/UL (ref 4.8–10.8)

## 2020-05-03 PROCEDURE — 6360000002 HC RX W HCPCS: Performed by: OBSTETRICS & GYNECOLOGY

## 2020-05-03 PROCEDURE — 36415 COLL VENOUS BLD VENIPUNCTURE: CPT

## 2020-05-03 PROCEDURE — 4500000002 HC ER NO CHARGE

## 2020-05-03 PROCEDURE — 2580000003 HC RX 258: Performed by: OBSTETRICS & GYNECOLOGY

## 2020-05-03 PROCEDURE — 86850 RBC ANTIBODY SCREEN: CPT

## 2020-05-03 PROCEDURE — 1220000000 HC SEMI PRIVATE OB R&B

## 2020-05-03 PROCEDURE — 85027 COMPLETE CBC AUTOMATED: CPT

## 2020-05-03 PROCEDURE — 80307 DRUG TEST PRSMV CHEM ANLYZR: CPT

## 2020-05-03 PROCEDURE — 86901 BLOOD TYPING SEROLOGIC RH(D): CPT

## 2020-05-03 PROCEDURE — 86900 BLOOD TYPING SEROLOGIC ABO: CPT

## 2020-05-03 PROCEDURE — 3E033VJ INTRODUCTION OF OTHER HORMONE INTO PERIPHERAL VEIN, PERCUTANEOUS APPROACH: ICD-10-PCS | Performed by: OBSTETRICS & GYNECOLOGY

## 2020-05-03 RX ORDER — SODIUM CHLORIDE 0.9 % (FLUSH) 0.9 %
10 SYRINGE (ML) INJECTION PRN
Status: DISCONTINUED | OUTPATIENT
Start: 2020-05-03 | End: 2020-05-05 | Stop reason: HOSPADM

## 2020-05-03 RX ORDER — SODIUM CHLORIDE, SODIUM LACTATE, POTASSIUM CHLORIDE, CALCIUM CHLORIDE 600; 310; 30; 20 MG/100ML; MG/100ML; MG/100ML; MG/100ML
INJECTION, SOLUTION INTRAVENOUS CONTINUOUS
Status: DISCONTINUED | OUTPATIENT
Start: 2020-05-03 | End: 2020-05-05 | Stop reason: HOSPADM

## 2020-05-03 RX ORDER — SODIUM CHLORIDE 0.9 % (FLUSH) 0.9 %
10 SYRINGE (ML) INJECTION EVERY 12 HOURS SCHEDULED
Status: DISCONTINUED | OUTPATIENT
Start: 2020-05-03 | End: 2020-05-05 | Stop reason: HOSPADM

## 2020-05-03 RX ORDER — DOCUSATE SODIUM 100 MG/1
100 CAPSULE, LIQUID FILLED ORAL 2 TIMES DAILY
Status: DISCONTINUED | OUTPATIENT
Start: 2020-05-03 | End: 2020-05-04

## 2020-05-03 RX ORDER — ONDANSETRON 2 MG/ML
4 INJECTION INTRAMUSCULAR; INTRAVENOUS EVERY 6 HOURS PRN
Status: DISCONTINUED | OUTPATIENT
Start: 2020-05-03 | End: 2020-05-05 | Stop reason: HOSPADM

## 2020-05-03 RX ADMIN — Medication 1 MILLI-UNITS/MIN: at 22:00

## 2020-05-03 RX ADMIN — SODIUM CHLORIDE, POTASSIUM CHLORIDE, SODIUM LACTATE AND CALCIUM CHLORIDE: 600; 310; 30; 20 INJECTION, SOLUTION INTRAVENOUS at 23:02

## 2020-05-03 RX ADMIN — SODIUM CHLORIDE, POTASSIUM CHLORIDE, SODIUM LACTATE AND CALCIUM CHLORIDE: 600; 310; 30; 20 INJECTION, SOLUTION INTRAVENOUS at 20:30

## 2020-05-04 ENCOUNTER — ANESTHESIA EVENT (OUTPATIENT)
Dept: LABOR AND DELIVERY | Age: 30
End: 2020-05-04
Payer: COMMERCIAL

## 2020-05-04 ENCOUNTER — ANESTHESIA (OUTPATIENT)
Dept: LABOR AND DELIVERY | Age: 30
End: 2020-05-04
Payer: COMMERCIAL

## 2020-05-04 PROCEDURE — 2500000003 HC RX 250 WO HCPCS: Performed by: NURSE ANESTHETIST, CERTIFIED REGISTERED

## 2020-05-04 PROCEDURE — 3700000025 EPIDURAL BLOCK: Performed by: NURSE ANESTHETIST, CERTIFIED REGISTERED

## 2020-05-04 PROCEDURE — 6370000000 HC RX 637 (ALT 250 FOR IP): Performed by: OBSTETRICS & GYNECOLOGY

## 2020-05-04 PROCEDURE — 2580000003 HC RX 258: Performed by: OBSTETRICS & GYNECOLOGY

## 2020-05-04 PROCEDURE — 6360000002 HC RX W HCPCS: Performed by: NURSE ANESTHETIST, CERTIFIED REGISTERED

## 2020-05-04 PROCEDURE — 7200000001 HC VAGINAL DELIVERY

## 2020-05-04 PROCEDURE — 59400 OBSTETRICAL CARE: CPT | Performed by: OBSTETRICS & GYNECOLOGY

## 2020-05-04 PROCEDURE — 1220000000 HC SEMI PRIVATE OB R&B

## 2020-05-04 PROCEDURE — 6360000002 HC RX W HCPCS: Performed by: OBSTETRICS & GYNECOLOGY

## 2020-05-04 RX ORDER — SODIUM CHLORIDE, SODIUM LACTATE, POTASSIUM CHLORIDE, CALCIUM CHLORIDE 600; 310; 30; 20 MG/100ML; MG/100ML; MG/100ML; MG/100ML
INJECTION, SOLUTION INTRAVENOUS CONTINUOUS
Status: DISCONTINUED | OUTPATIENT
Start: 2020-05-04 | End: 2020-05-05 | Stop reason: HOSPADM

## 2020-05-04 RX ORDER — ROPIVACAINE HYDROCHLORIDE 2 MG/ML
INJECTION, SOLUTION EPIDURAL; INFILTRATION; PERINEURAL PRN
Status: DISCONTINUED | OUTPATIENT
Start: 2020-05-04 | End: 2020-05-04 | Stop reason: SDUPTHER

## 2020-05-04 RX ORDER — OXYCODONE HYDROCHLORIDE AND ACETAMINOPHEN 5; 325 MG/1; MG/1
1 TABLET ORAL EVERY 4 HOURS PRN
Status: DISCONTINUED | OUTPATIENT
Start: 2020-05-04 | End: 2020-05-05 | Stop reason: HOSPADM

## 2020-05-04 RX ORDER — FENTANYL CITRATE 50 UG/ML
INJECTION, SOLUTION INTRAMUSCULAR; INTRAVENOUS PRN
Status: DISCONTINUED | OUTPATIENT
Start: 2020-05-04 | End: 2020-05-04 | Stop reason: SDUPTHER

## 2020-05-04 RX ORDER — IBUPROFEN 400 MG/1
800 TABLET ORAL EVERY 8 HOURS PRN
Status: DISCONTINUED | OUTPATIENT
Start: 2020-05-04 | End: 2020-05-05 | Stop reason: HOSPADM

## 2020-05-04 RX ORDER — SODIUM CHLORIDE 0.9 % (FLUSH) 0.9 %
10 SYRINGE (ML) INJECTION PRN
Status: DISCONTINUED | OUTPATIENT
Start: 2020-05-04 | End: 2020-05-05 | Stop reason: HOSPADM

## 2020-05-04 RX ORDER — OXYCODONE HYDROCHLORIDE AND ACETAMINOPHEN 5; 325 MG/1; MG/1
2 TABLET ORAL EVERY 4 HOURS PRN
Status: DISCONTINUED | OUTPATIENT
Start: 2020-05-04 | End: 2020-05-05 | Stop reason: HOSPADM

## 2020-05-04 RX ORDER — LIDOCAINE HYDROCHLORIDE 10 MG/ML
INJECTION, SOLUTION EPIDURAL; INFILTRATION; INTRACAUDAL; PERINEURAL PRN
Status: DISCONTINUED | OUTPATIENT
Start: 2020-05-04 | End: 2020-05-04 | Stop reason: SDUPTHER

## 2020-05-04 RX ORDER — FLUOXETINE 10 MG/1
20 CAPSULE ORAL DAILY
Status: DISCONTINUED | OUTPATIENT
Start: 2020-05-04 | End: 2020-05-05 | Stop reason: HOSPADM

## 2020-05-04 RX ORDER — DOCUSATE SODIUM 100 MG/1
100 CAPSULE, LIQUID FILLED ORAL 2 TIMES DAILY
Status: DISCONTINUED | OUTPATIENT
Start: 2020-05-04 | End: 2020-05-05 | Stop reason: HOSPADM

## 2020-05-04 RX ORDER — ROPIVACAINE HYDROCHLORIDE 2 MG/ML
INJECTION, SOLUTION EPIDURAL; INFILTRATION; PERINEURAL CONTINUOUS PRN
Status: DISCONTINUED | OUTPATIENT
Start: 2020-05-04 | End: 2020-05-04 | Stop reason: SDUPTHER

## 2020-05-04 RX ORDER — SODIUM CHLORIDE 0.9 % (FLUSH) 0.9 %
10 SYRINGE (ML) INJECTION EVERY 12 HOURS SCHEDULED
Status: DISCONTINUED | OUTPATIENT
Start: 2020-05-04 | End: 2020-05-05 | Stop reason: HOSPADM

## 2020-05-04 RX ORDER — LIDOCAINE HYDROCHLORIDE AND EPINEPHRINE 15; 5 MG/ML; UG/ML
INJECTION, SOLUTION EPIDURAL PRN
Status: DISCONTINUED | OUTPATIENT
Start: 2020-05-04 | End: 2020-05-04 | Stop reason: SDUPTHER

## 2020-05-04 RX ORDER — ACETAMINOPHEN 325 MG/1
650 TABLET ORAL EVERY 4 HOURS PRN
Status: DISCONTINUED | OUTPATIENT
Start: 2020-05-04 | End: 2020-05-05 | Stop reason: HOSPADM

## 2020-05-04 RX ADMIN — IBUPROFEN 800 MG: 400 TABLET ORAL at 13:22

## 2020-05-04 RX ADMIN — DOCUSATE SODIUM 100 MG: 100 CAPSULE, LIQUID FILLED ORAL at 20:58

## 2020-05-04 RX ADMIN — BENZOCAINE AND LEVOMENTHOL: 200; 5 SPRAY TOPICAL at 21:14

## 2020-05-04 RX ADMIN — FENTANYL CITRATE 100 MCG: 50 INJECTION INTRAMUSCULAR; INTRAVENOUS at 12:19

## 2020-05-04 RX ADMIN — IBUPROFEN 800 MG: 400 TABLET ORAL at 20:58

## 2020-05-04 RX ADMIN — ROPIVACAINE HYDROCHLORIDE 10 ML/HR: 2 INJECTION, SOLUTION EPIDURAL; INFILTRATION; PERINEURAL at 12:24

## 2020-05-04 RX ADMIN — LIDOCAINE HYDROCHLORIDE AND EPINEPHRINE 2 ML: 15; 5 INJECTION, SOLUTION EPIDURAL at 12:18

## 2020-05-04 RX ADMIN — BUTORPHANOL TARTRATE 1 MG: 2 INJECTION, SOLUTION INTRAMUSCULAR; INTRAVENOUS at 10:35

## 2020-05-04 RX ADMIN — LIDOCAINE HYDROCHLORIDE 3 ML: 10 INJECTION, SOLUTION EPIDURAL; INFILTRATION; INTRACAUDAL; PERINEURAL at 12:08

## 2020-05-04 RX ADMIN — SODIUM CHLORIDE, POTASSIUM CHLORIDE, SODIUM LACTATE AND CALCIUM CHLORIDE: 600; 310; 30; 20 INJECTION, SOLUTION INTRAVENOUS at 06:58

## 2020-05-04 RX ADMIN — FLUOXETINE 20 MG: 10 CAPSULE ORAL at 21:14

## 2020-05-04 RX ADMIN — LIDOCAINE HYDROCHLORIDE 2 ML: 10 INJECTION, SOLUTION EPIDURAL; INFILTRATION; INTRACAUDAL; PERINEURAL at 12:11

## 2020-05-04 RX ADMIN — LIDOCAINE HYDROCHLORIDE AND EPINEPHRINE 3 ML: 15; 5 INJECTION, SOLUTION EPIDURAL at 12:16

## 2020-05-04 RX ADMIN — ROPIVACAINE HYDROCHLORIDE 8 ML: 2 INJECTION, SOLUTION EPIDURAL; INFILTRATION at 12:19

## 2020-05-04 ASSESSMENT — PAIN SCALES - GENERAL
PAINLEVEL_OUTOF10: 5
PAINLEVEL_OUTOF10: 4
PAINLEVEL_OUTOF10: 5

## 2020-05-04 NOTE — FLOWSHEET NOTE
Ambulated to bathroom. Strong steady gait. Josy care done. Pt voided large amt. Pt amubalted to 213. Strong steady gait.

## 2020-05-04 NOTE — ANESTHESIA POSTPROCEDURE EVALUATION
Department of Anesthesiology  Postprocedure Note    Patient: Pat Cross  MRN: [id number]  YOB: 1990  Date of evaluation: 5/4/2020  Time:  12:55 PM     Procedure Summary     Date:  05/04/20 Room / Location:      Anesthesia Start:  1205 Anesthesia Stop:  [phone]    Procedure:  Labor Analgesia Diagnosis:      Scheduled Providers:   Responsible Provider:  Unk Reasons, APRN - CRNA    Anesthesia Type:  epidural ASA Status:  3          Anesthesia Type: epidural    Cong Phase I:      Cong Phase II:      Last vitals: Reviewed and per EMR flowsheets.        Anesthesia Post Evaluation    Patient location during evaluation: bedside  Patient participation: complete - patient participated  Level of consciousness: awake and alert  Pain score: 0  Airway patency: patent  Nausea & Vomiting: no nausea and no vomiting  Complications: no  Cardiovascular status: hemodynamically stable  Respiratory status: acceptable  Hydration status: euvolemic

## 2020-05-04 NOTE — H&P
OBSTETRICAL HISTORY AND PHYSICAL    Provider:  Amanda Sun   Date: 2020  Time: 1:35 PM    Patient Name: Oliva Stanley  Patient : 1990  Room/Bed: [facility]0217-    Primary Care Physician: KIMBERLEY West CNP      Chief Complaint:  contractions    HPI: Oliva Stanley is a 30 y.o. [id number] at 39w1d weeks who presents for elective IOL. Contractions: Yes  Rupture Of Membranes: No  Bleeding: No    Pregnancy Risk factors:  Patient Active Problem List   Diagnosis    Allergic rhinitis, seasonal    Missed ab    Supervision of pregnancy with history of abortive outcome    Abnormal ultrasound of gallbladder    Anxiety    Depression with anxiety    Cramping affecting pregnancy, antepartum    HTN (hypertension)    37 weeks gestation of pregnancy    PIH (pregnancy induced hypertension), antepartum    Hypertension    39 weeks gestation of pregnancy    Spider veins of both lower extremities    Right leg pain    Varicose veins of both lower extremities    Varicose veins of bilateral lower extremities with other complications       Allergies: Allergies as of 2020    (No Known Allergies)       Medications:  No current facility-administered medications on file prior to encounter.       Current Outpatient Medications on File Prior to Encounter   Medication Sig Dispense Refill    Prenatal Vit-Fe Fumarate-FA (PRENATAL VITAMINS) 28-0.8 MG TABS Take 1 tablet by mouth daily 30 tablet 11    FLUoxetine (PROZAC) 20 MG capsule Take 1 capsule by mouth daily 30 capsule 11           Past Medical History:   Diagnosis Date    Chiari malformation     Depression     History of chicken pox     Neurologic disorder     has not had one since 5th grade    Recurrent acute otitis media     as a child, required tubes, then L TM reconstruction    Seizures ([address]     absent, as a child        Past Surgical History:   Procedure Laterality Date    CHOLECYSTECTOMY      DILATION AND CURETTAGE  13 Attends Mu-ism service: Not on file     Active member of club or organization: Not on file     Attends meetings of clubs or organizations: Not on file     Relationship status: Not on file    Intimate partner violence     Fear of current or ex partner: Not on file     Emotionally abused: Not on file     Physically abused: Not on file     Forced sexual activity: Not on file   Other Topics Concern    Not on file   Social History Narrative    Not on file       Review Of Systems:  A comprehensive review of systems was negative except for what was noted in the HPI. Physical Exam:  Vitals:    05/04/20 0931 05/04/20 1001 05/04/20 1031 05/04/20 1101   BP: 132/78 134/89 132/85 133/87   Pulse: 86 71 70 76   Resp:       Temp:       TempSrc:       SpO2:       Height:             General appearance:  awake, alert, cooperative, no apparent distress, and appears stated age  Neurologic:  Awake, alert, oriented to name, place and time. Motor is 5 out of 5 bilaterally.   Lungs:  No increased work of breathing, good air exchange, clear to auscultation bilaterally, no crackles or wheezing  Heart:  Normal apical impulse, regular rate and rhythm  Abdomen: Gravid, soft, non-tender, non-distended    Pelvic Exam:  Cervix Check:   DILATION:  2 cm   EFFACEMENT:   50%   STATION:  -2 cm   CONSISTENCY:  med   POSITION:  mid        Lab Results:   Blood Type/Rh:    ABO/Rh   Date Value Ref Range Status   05/03/2020 A NEG  Final     Antibody Screen:    Antibody Screen   Date Value Ref Range Status   05/03/2020 NEG  Final   05/02/2014 No Irregular Antibodies Found  Final     Hemoglobin:   Hemoglobin   Date Value Ref Range Status   05/03/2020 11.3 (L) 12.0 - 16.0 g/dL Final     Hematocrit:   Hematocrit   Date Value Ref Range Status   05/03/2020 33.0 (L) 37.0 - 47.0 % Final     Platelet Count:   Platelets   Date Value Ref Range Status   05/03/2020 231 130 - 400 K/uL Final       Assessment:  Camrina Diaz is a 30 y.o. female  [id number] At 36w3d

## 2020-05-04 NOTE — ANESTHESIA PRE PROCEDURE
Depression with anxiety F41.8    Cramping affecting pregnancy, antepartum O26.899, R10.9    HTN (hypertension) I10    37 weeks gestation of pregnancy Z3A.37    PIH (pregnancy induced hypertension), antepartum O13.9    Hypertension I10    39 weeks gestation of pregnancy Z3A.39    Spider veins of both lower extremities I83.93    Right leg pain M79.604    Varicose veins of both lower extremities I83.93    Varicose veins of bilateral lower extremities with other complications Y89.313       Past Medical History:        Diagnosis Date    Chiari malformation     Depression     History of chicken pox     Neurologic disorder     has not had one since 5th grade    Recurrent acute otitis media     as a child, required tubes, then L TM reconstruction    Seizures ([address]     absent, as a child        Past Surgical History:        Procedure Laterality Date    CHOLECYSTECTOMY      DILATION AND CURETTAGE  13    INNER EAR SURGERY      L ear TM reconstruction    TYMPANOSTOMY TUBE PLACEMENT         Social History:    Social History     Tobacco Use    Smoking status: Former Smoker     Packs/day: 0.50     Years: 3.00     Pack years: 1.50     Types: Cigarettes     Last attempt to quit: 2013     Years since quittin.7    Smokeless tobacco: Never Used   Substance Use Topics    Alcohol use:  No                                Counseling given: Not Answered      Vital Signs (Current):   Vitals:    20 0931 20 1001 20 1031 20 1101   BP: 132/78 134/89 132/85 133/87   Pulse: 86 71 70 76   Resp:       Temp:       TempSrc:       SpO2:       Height:                                                  BP Readings from Last 3 Encounters:   20 133/87   20 137/81   20 135/80       NPO Status: Time of last liquid consumption: 1930                        Time of last solid consumption: 1800                        Date of last liquid consumption: 20 Date of last solid food consumption: 05/03/20    BMI:   Wt Readings from Last 3 Encounters:   04/23/20 (!) 325 lb (147.4 kg)   04/17/20 (!) 324 lb (147 kg)   04/07/20 (!) 320 lb (145.2 kg)     Body mass index is 47.99 kg/m². CBC:   Lab Results   Component Value Date    WBC 11.4 05/03/2020    RBC 3.64 05/03/2020    HGB 11.3 05/03/2020    HCT 33.0 05/03/2020    MCV 90.7 05/03/2020    RDW 14.0 05/03/2020     05/03/2020       CMP:   Lab Results   Component Value Date     04/08/2020    K 3.8 04/08/2020     04/08/2020    CO2 19 04/08/2020    BUN 10 04/08/2020    CREATININE 0.5 04/08/2020    LABGLOM >60 04/08/2020    GLUCOSE 103 04/08/2020    PROT 6.3 04/08/2020    PROT 6.4 03/14/2013    CALCIUM 9.0 04/08/2020    BILITOT 0.3 04/08/2020    ALKPHOS 77 04/08/2020    AST 11 04/08/2020    ALT 8 04/08/2020       POC Tests: No results for input(s): POCGLU, POCNA, POCK, POCCL, POCBUN, POCHEMO, POCHCT in the last 72 hours.     Coags:   Lab Results   Component Value Date    PROTIME 13.3 01/28/2014    INR 1.06 01/28/2014       HCG (If Applicable):   Lab Results   Component Value Date    PREGTESTUR neg 08/19/2013        ABGs: No results found for: PHART, PO2ART, TSX0FRK, LZW1ZSQ, BEART, N8UENGUG     Type & Screen (If Applicable):  No results found for: LABABO, [address]    Anesthesia Evaluation  Patient summary reviewed and Nursing notes reviewed no history of anesthetic complications:   Airway: Mallampati: III  TM distance: >3 FB   Neck ROM: full  Mouth opening: > = 3 FB Dental: normal exam         Pulmonary:Negative Pulmonary ROS and normal exam                               Cardiovascular:  Exercise tolerance: good (>4 METS),   (+) hypertension:,                   Neuro/Psych:   (+) psychiatric history:            GI/Hepatic/Renal:   (+) morbid obesity          Endo/Other: Negative Endo/Other ROS                    Abdominal:   (+) obese,         Vascular:                                        Anesthesia

## 2020-05-05 VITALS
HEIGHT: 69 IN | SYSTOLIC BLOOD PRESSURE: 101 MMHG | OXYGEN SATURATION: 97 % | RESPIRATION RATE: 16 BRPM | HEART RATE: 74 BPM | DIASTOLIC BLOOD PRESSURE: 61 MMHG | TEMPERATURE: 97.9 F | BODY MASS INDEX: 47.99 KG/M2

## 2020-05-05 LAB
BASOPHILS ABSOLUTE: 0 K/UL (ref 0–0.2)
BASOPHILS RELATIVE PERCENT: 0.2 % (ref 0–1)
EOSINOPHILS ABSOLUTE: 0.1 K/UL (ref 0–0.6)
EOSINOPHILS RELATIVE PERCENT: 1.4 % (ref 0–5)
HCT VFR BLD CALC: 30.5 % (ref 37–47)
HEMOGLOBIN: 10.1 G/DL (ref 12–16)
IMMATURE GRANULOCYTES #: 0.1 K/UL
LYMPHOCYTES ABSOLUTE: 2.3 K/UL (ref 1.1–4.5)
LYMPHOCYTES RELATIVE PERCENT: 22.8 % (ref 20–40)
MCH RBC QN AUTO: 30.9 PG (ref 27–31)
MCHC RBC AUTO-ENTMCNC: 33.1 G/DL (ref 33–37)
MCV RBC AUTO: 93.3 FL (ref 81–99)
MONOCYTES ABSOLUTE: 0.7 K/UL (ref 0–0.9)
MONOCYTES RELATIVE PERCENT: 6.5 % (ref 0–10)
NEUTROPHILS ABSOLUTE: 6.8 K/UL (ref 1.5–7.5)
NEUTROPHILS RELATIVE PERCENT: 67.7 % (ref 50–65)
PDW BLD-RTO: 14.3 % (ref 11.5–14.5)
PLATELET # BLD: 185 K/UL (ref 130–400)
PMV BLD AUTO: 12.1 FL (ref 9.4–12.3)
RBC # BLD: 3.27 M/UL (ref 4.2–5.4)
WBC # BLD: 10 K/UL (ref 4.8–10.8)

## 2020-05-05 PROCEDURE — 99232 SBSQ HOSP IP/OBS MODERATE 35: CPT | Performed by: NURSE PRACTITIONER

## 2020-05-05 PROCEDURE — 6370000000 HC RX 637 (ALT 250 FOR IP): Performed by: OBSTETRICS & GYNECOLOGY

## 2020-05-05 PROCEDURE — 85025 COMPLETE CBC W/AUTO DIFF WBC: CPT

## 2020-05-05 PROCEDURE — 36415 COLL VENOUS BLD VENIPUNCTURE: CPT

## 2020-05-05 RX ORDER — IBUPROFEN 800 MG/1
800 TABLET ORAL EVERY 8 HOURS PRN
Qty: 90 TABLET | Refills: 0 | Status: SHIPPED | OUTPATIENT
Start: 2020-05-05 | End: 2021-04-21

## 2020-05-05 RX ADMIN — IBUPROFEN 800 MG: 400 TABLET ORAL at 06:30

## 2020-05-05 RX ADMIN — DOCUSATE SODIUM 100 MG: 100 CAPSULE, LIQUID FILLED ORAL at 08:08

## 2020-05-05 ASSESSMENT — PAIN SCALES - GENERAL: PAINLEVEL_OUTOF10: 5

## 2020-05-05 NOTE — DISCHARGE SUMMARY
Subjective:      Postpartum Day 1: Vaginal Delivery     Patient is a [id number] The patient feels well. The patient denies emotional concerns. Pain is well controlled with current medications. The baby iswell. Baby is feeding via breast. Urinary output is adequate. The patient is ambulating well. The patient is tolerating a normal diet. Flatus has been passed.     Objective:      Patient Vitals for the past 8 hrs:    BP Temp Temp src Pulse Resp SpO2   05/05/20 0649 125/80 98.1 °F (36.7 °C) Temporal 75 15 97 %   05/05/20 0135 134/84 97.8 °F (36.6 °C) Temporal 68 16 --      General:    alert, appears stated age and cooperative   Bowel Sounds:  active   Lochia:  appropriate   Uterine Fundus:   firm   Episiotomy:  healing well, no significant drainage, no dehiscence, no significant erythema   DVT Evaluation:  No evidence of DVT seen on physical exam.            Lab Results   Component Value Date     WBC 10.0 05/05/2020     HGB 10.1 (L) 05/05/2020     HCT 30.5 (L) 05/05/2020     MCV 93.3 05/05/2020      05/05/2020      Assessment:      Status post vaginal delivery. doing well post vaginal     Plan:      Continue current care. Pt requesting discharge home. Mother discharge home in good condition and f/u in 2 weeks for a virtual visit. Sooner with any problems.

## 2020-05-07 RX ORDER — FLUOXETINE HYDROCHLORIDE 40 MG/1
40 CAPSULE ORAL DAILY
Qty: 30 CAPSULE | Refills: 3 | Status: SHIPPED | OUTPATIENT
Start: 2020-05-07 | End: 2021-01-07 | Stop reason: DRUGHIGH

## 2020-06-03 RX ORDER — CLINDAMYCIN HYDROCHLORIDE 300 MG/1
300 CAPSULE ORAL 2 TIMES DAILY
Qty: 14 CAPSULE | Refills: 0 | Status: SHIPPED | OUTPATIENT
Start: 2020-06-03 | End: 2020-06-10

## 2020-09-24 ENCOUNTER — NURSE ONLY (OUTPATIENT)
Dept: PRIMARY CARE CLINIC | Age: 30
End: 2020-09-24
Payer: COMMERCIAL

## 2020-09-24 LAB
BASOPHILS ABSOLUTE: 0 K/UL (ref 0–0.2)
BASOPHILS RELATIVE PERCENT: 0.4 % (ref 0–1)
EOSINOPHILS ABSOLUTE: 0.2 K/UL (ref 0–0.6)
EOSINOPHILS RELATIVE PERCENT: 3 % (ref 0–5)
HCT VFR BLD CALC: 36.9 % (ref 37–47)
HEMOGLOBIN: 12.2 G/DL (ref 12–16)
IMMATURE GRANULOCYTES #: 0 K/UL
IRON SATURATION: 19 % (ref 14–50)
IRON: 56 UG/DL (ref 37–145)
LYMPHOCYTES ABSOLUTE: 2.6 K/UL (ref 1.1–4.5)
LYMPHOCYTES RELATIVE PERCENT: 38.1 % (ref 20–40)
MCH RBC QN AUTO: 29.1 PG (ref 27–31)
MCHC RBC AUTO-ENTMCNC: 33.1 G/DL (ref 33–37)
MCV RBC AUTO: 88.1 FL (ref 81–99)
MONOCYTES ABSOLUTE: 0.4 K/UL (ref 0–0.9)
MONOCYTES RELATIVE PERCENT: 6.3 % (ref 0–10)
NEUTROPHILS ABSOLUTE: 3.5 K/UL (ref 1.5–7.5)
NEUTROPHILS RELATIVE PERCENT: 51.9 % (ref 50–65)
PDW BLD-RTO: 12.7 % (ref 11.5–14.5)
PLATELET # BLD: 261 K/UL (ref 130–400)
PMV BLD AUTO: 12.2 FL (ref 9.4–12.3)
RBC # BLD: 4.19 M/UL (ref 4.2–5.4)
TOTAL IRON BINDING CAPACITY: 294 UG/DL (ref 250–400)
TSH SERPL DL<=0.05 MIU/L-ACNC: 1.46 UIU/ML (ref 0.27–4.2)
WBC # BLD: 6.7 K/UL (ref 4.8–10.8)

## 2020-09-24 PROCEDURE — 36415 COLL VENOUS BLD VENIPUNCTURE: CPT | Performed by: NURSE PRACTITIONER

## 2020-09-30 ENCOUNTER — OFFICE VISIT (OUTPATIENT)
Dept: NEUROLOGY | Age: 30
End: 2020-09-30
Payer: COMMERCIAL

## 2020-09-30 VITALS
HEIGHT: 69 IN | WEIGHT: 293 LBS | HEART RATE: 84 BPM | BODY MASS INDEX: 43.4 KG/M2 | SYSTOLIC BLOOD PRESSURE: 110 MMHG | DIASTOLIC BLOOD PRESSURE: 77 MMHG

## 2020-09-30 PROBLEM — M54.50 LOW BACK PAIN: Status: ACTIVE | Noted: 2020-09-30

## 2020-09-30 PROCEDURE — 99203 OFFICE O/P NEW LOW 30 MIN: CPT | Performed by: PSYCHIATRY & NEUROLOGY

## 2020-09-30 RX ORDER — METHYLPREDNISOLONE 4 MG/1
TABLET ORAL
Qty: 1 KIT | Refills: 2 | Status: SHIPPED | OUTPATIENT
Start: 2020-09-30 | End: 2020-10-06

## 2020-09-30 RX ORDER — METAXALONE 800 MG/1
800 TABLET ORAL 3 TIMES DAILY PRN
Qty: 90 TABLET | Refills: 5 | Status: SHIPPED | OUTPATIENT
Start: 2020-09-30 | End: 2020-10-30

## 2020-09-30 NOTE — PROGRESS NOTES
Chief Complaint   Patient presents with    New Patient     low back pain and leg pain        Denisse Ernst is a 35y.o. year old female who is seen for evaluation of low back pain following an epidural with the birth of her child in 5/20. The patient indicates it was quite difficult to get the needle into her epidural space and multiple attempts were undertaken. The next day she began to mid low back pain at the site of the puncture. She does have pain in her legs as well since then. There is no weakness, numbness or bowel or bladder issues. She has tried over the counter medications with little benefit. This can flair up at times.     Active Ambulatory Problems     Diagnosis Date Noted    Allergic rhinitis, seasonal 04/04/2013    Missed ab 05/01/2013    Supervision of pregnancy with history of abortive outcome 09/20/2013    Abnormal ultrasound of gallbladder 01/31/2014    Anxiety 01/10/2017    Depression with anxiety 05/01/2017    Cramping affecting pregnancy, antepartum 08/24/2018    HTN (hypertension) 10/10/2018    37 weeks gestation of pregnancy 10/16/2018    PIH (pregnancy induced hypertension), antepartum 10/24/2018    Hypertension 10/24/2018    39 weeks gestation of pregnancy 10/29/2018    Spider veins of both lower extremities 06/27/2019    Right leg pain 06/27/2019    Varicose veins of both lower extremities 06/27/2019    Varicose veins of bilateral lower extremities with other complications 70/28/6906    Low back pain 09/30/2020     Resolved Ambulatory Problems     Diagnosis Date Noted    Sore throat 12/19/2011    Supervision of normal first pregnancy 04/04/2013    Epigastric pain 01/31/2014    Nausea & vomiting 01/31/2014    Hematemesis 01/31/2014    Diarrhea 01/31/2014     Past Medical History:   Diagnosis Date    Chiari malformation     Depression     History of chicken pox     Neurologic disorder     Recurrent acute otitis media     Seizures (HCC)        Past Surgical History:   Procedure Laterality Date    CHOLECYSTECTOMY      DILATION AND CURETTAGE  13    INNER EAR SURGERY  2009    L ear TM reconstruction    TYMPANOSTOMY TUBE PLACEMENT         Family History   Problem Relation Age of Onset    High Blood Pressure Mother     Depression Mother     Colon Polyps Father     Colon Polyps Maternal Grandfather     Colon Cancer Neg Hx        No Known Allergies    Social History     Socioeconomic History    Marital status:      Spouse name: Not on file    Number of children: Not on file    Years of education: Not on file    Highest education level: Not on file   Occupational History    Not on file   Social Needs    Financial resource strain: Not on file    Food insecurity     Worry: Not on file     Inability: Not on file    Transportation needs     Medical: Not on file     Non-medical: Not on file   Tobacco Use    Smoking status: Former Smoker     Packs/day: 0.50     Years: 3.00     Pack years: 1.50     Types: Cigarettes     Last attempt to quit: 2013     Years since quittin.1    Smokeless tobacco: Never Used   Substance and Sexual Activity    Alcohol use: No    Drug use: No    Sexual activity: Yes     Partners: Male   Lifestyle    Physical activity     Days per week: Not on file     Minutes per session: Not on file    Stress: Not on file   Relationships    Social connections     Talks on phone: Not on file     Gets together: Not on file     Attends Gnosticist service: Not on file     Active member of club or organization: Not on file     Attends meetings of clubs or organizations: Not on file     Relationship status: Not on file    Intimate partner violence     Fear of current or ex partner: Not on file     Emotionally abused: Not on file     Physically abused: Not on file     Forced sexual activity: Not on file   Other Topics Concern    Not on file   Social History Narrative    Not on file     Review of Systems     Constitutional - No completeness. She was given a medrol dose pack and will be started on Skelaxin. She is to call following testing to determine what further management is warranted. Plan    No orders of the defined types were placed in this encounter. Orders Placed This Encounter   Medications    methylPREDNISolone (MEDROL, MARIA T,) 4 MG tablet     Sig: Take by mouth. Dispense:  1 kit     Refill:  2    metaxalone (SKELAXIN) 800 MG tablet     Sig: Take 1 tablet by mouth 3 times daily as needed for Pain     Dispense:  90 tablet     Refill:  5       Return if symptoms worsen or fail to improve. EMR Dragon/transcription disclaimer:Significant part of this  encounter note is electronic transcription/translation of spoken language to printed text. The electronic translation of spoken language may be erroneous, or at times, nonsensical words or phrases may be inadvertently transcribed.  Although I have reviewed the note for such errors, some may still exist.

## 2020-10-22 ENCOUNTER — HOSPITAL ENCOUNTER (OUTPATIENT)
Dept: MRI IMAGING | Age: 30
Discharge: HOME | End: 2020-10-22
Payer: COMMERCIAL

## 2020-10-22 PROCEDURE — 72148 MRI LUMBAR SPINE W/O DYE: CPT

## 2020-10-23 RX ORDER — GABAPENTIN 300 MG/1
300 CAPSULE ORAL 3 TIMES DAILY
Qty: 90 CAPSULE | Refills: 2 | Status: SHIPPED | OUTPATIENT
Start: 2020-10-23 | End: 2021-04-21

## 2020-11-03 PROBLEM — I10 HTN (HYPERTENSION): Status: RESOLVED | Noted: 2018-10-10 | Resolved: 2020-11-03

## 2020-12-08 ENCOUNTER — TELEPHONE (OUTPATIENT)
Dept: PRIMARY CARE CLINIC | Age: 30
End: 2020-12-08

## 2020-12-08 RX ORDER — CIPROFLOXACIN 250 MG/1
250 TABLET, FILM COATED ORAL 2 TIMES DAILY
Qty: 14 TABLET | Refills: 0 | Status: SHIPPED | OUTPATIENT
Start: 2020-12-08 | End: 2020-12-15

## 2020-12-08 NOTE — TELEPHONE ENCOUNTER
Patient thinks she may have a UTI and is requesting medication to be called in. (Patient uses [facility].)     Patient complains of urgency, unable to completely empty bladder, burning. She states that she has tried to urinate around 14 times this morning. Onset when patient woke up today. Please call patient back at [phone] with advice.  eduardo

## 2021-01-07 ENCOUNTER — OFFICE VISIT (OUTPATIENT)
Dept: PRIMARY CARE CLINIC | Age: 31
End: 2021-01-07
Payer: COMMERCIAL

## 2021-01-07 VITALS
WEIGHT: 282 LBS | SYSTOLIC BLOOD PRESSURE: 100 MMHG | HEIGHT: 69 IN | HEART RATE: 97 BPM | DIASTOLIC BLOOD PRESSURE: 72 MMHG | TEMPERATURE: 97 F | RESPIRATION RATE: 16 BRPM | BODY MASS INDEX: 41.77 KG/M2 | OXYGEN SATURATION: 98 %

## 2021-01-07 DIAGNOSIS — Z13.220 ENCOUNTER FOR SCREENING FOR LIPID DISORDER: ICD-10-CM

## 2021-01-07 DIAGNOSIS — Z00.00 WELL ADULT EXAM: Primary | ICD-10-CM

## 2021-01-07 DIAGNOSIS — F41.8 DEPRESSION WITH ANXIETY: ICD-10-CM

## 2021-01-07 DIAGNOSIS — Z13.1 SCREENING FOR DIABETES MELLITUS: ICD-10-CM

## 2021-01-07 DIAGNOSIS — R39.89 SENSATION OF PRESSURE IN BLADDER AREA: ICD-10-CM

## 2021-01-07 LAB
APPEARANCE FLUID: CLEAR
BILIRUBIN, POC: NORMAL
BLOOD URINE, POC: NORMAL
CLARITY, POC: CLEAR
COLOR, POC: YELLOW
GLUCOSE URINE, POC: NORMAL
KETONES, POC: NORMAL
LEUKOCYTE EST, POC: NORMAL
NITRITE, POC: NORMAL
PH, POC: 5
PROTEIN, POC: NORMAL
SPECIFIC GRAVITY, POC: 1010
UROBILINOGEN, POC: 0.2

## 2021-01-07 PROCEDURE — 99395 PREV VISIT EST AGE 18-39: CPT | Performed by: NURSE PRACTITIONER

## 2021-01-07 PROCEDURE — 81002 URINALYSIS NONAUTO W/O SCOPE: CPT | Performed by: NURSE PRACTITIONER

## 2021-01-07 RX ORDER — BUPROPION HYDROCHLORIDE 150 MG/1
150 TABLET ORAL EVERY MORNING
Qty: 30 TABLET | Refills: 3 | Status: SHIPPED | OUTPATIENT
Start: 2021-01-07 | End: 2021-04-21

## 2021-01-07 RX ORDER — FLUOXETINE HYDROCHLORIDE 20 MG/1
20 CAPSULE ORAL DAILY
Qty: 30 CAPSULE | Refills: 5 | Status: SHIPPED | OUTPATIENT
Start: 2021-01-07 | End: 2021-05-27 | Stop reason: DRUGHIGH

## 2021-01-07 ASSESSMENT — ENCOUNTER SYMPTOMS
EYES NEGATIVE: 1
GASTROINTESTINAL NEGATIVE: 1
RESPIRATORY NEGATIVE: 1

## 2021-01-07 NOTE — PATIENT INSTRUCTIONS
Start the medication, wellbutrin. You must take this medication daily. It will take about 10 days for you to notice a difference. decrease the prozac to 20. If depression worsens or no emotion stop the wellbutrin and call me. The most common side effect is some nausea but this should subside in a few days. After 10-14 days if you want to stop the prozac you can. We can always restart. Counseling with Nancy Juarez   Return for fasting labs, black coffee and water.

## 2021-01-07 NOTE — PROGRESS NOTES
[address] [facility]  [address] [place] [address]  [address] [place] [address]  Dept: [phone]  Dept Fax: [phone]  Loc: [phone]    Marice Galeazzi is a 35 y.o. female who presents today for her medical conditions/complaints as noted below. Marice Galeazzi is c/o of Annual Exam        HPI:     HPI   Chief Complaint   Patient presents with    Annual Exam    Has had a vasectomy. she had recent menses. She does see gyn for pap. She has been on the prozac for years. She feels it may need to be changed. She feels a little more anxiety and panic. She has any suicidal thoughts . Her father  unexpectedly a couple years ago and she thinks she never really processed that very well.   Past Medical History:   Diagnosis Date    Chiari malformation     Depression     History of chicken pox     Neurologic disorder     has not had one since 5th grade    Recurrent acute otitis media     as a child, required tubes, then L TM reconstruction    Seizures ([address]     absent, as a child       Past Surgical History:   Procedure Laterality Date    CHOLECYSTECTOMY      DILATION AND CURETTAGE  13    INNER EAR SURGERY  2009    L ear TM reconstruction    TYMPANOSTOMY TUBE PLACEMENT         Vitals 2021 3135   SYSTOLIC [phone] 778 - 182   DIASTOLIC 72 77 61 80 - 84   Pulse 97 84 74 75 - 68   Temp 97 - 97.9 98.1 - 97.8   Resp 16 - 16 15 - 16   SpO2 98 - - 97 - -   Weight 282 lb 325 lb - - - -   Height 5' 9\" 5' 9\" - - - -   Body mass index 41.64 kg/m2 47.99 kg/m2 - - - -   Pain Level - - - - 5 -   Some recent data might be hidden       Family History   Problem Relation Age of Onset    High Blood Pressure Mother     Depression Mother     Colon Polyps Father     Colon Polyps Maternal Grandfather     Colon Cancer Neg Hx        Social History     Tobacco Use    Smoking status: Former Smoker     Packs/day: 0.50     Years: 3.00 Pupils: Pupils are equal, round, and reactive to light. Neck:      Musculoskeletal: Normal range of motion. Cardiovascular:      Rate and Rhythm: Normal rate and regular rhythm. Heart sounds: Normal heart sounds. Pulmonary:      Effort: Pulmonary effort is normal.      Breath sounds: Normal breath sounds. Abdominal:      General: Bowel sounds are normal.      Tenderness: There is no abdominal tenderness. Genitourinary:     Comments: Declined  exam had done at GYN  Skin:     General: Skin is warm and dry. Neurological:      Mental Status: She is alert and oriented to person, place, and time. Psychiatric:         Behavior: Behavior normal.         Thought Content: Thought content normal.         Judgment: Judgment normal.       /72   Pulse 97   Temp 97 °F (36.1 °C) (Temporal)   Resp 16   Ht 5' 9\" (1.753 m)   Wt 282 lb (127.9 kg)   SpO2 98%   Breastfeeding No   BMI 41.64 kg/m²   Results for POC orders placed in visit on 01/07/21   POCT Urinalysis no Micro   Result Value Ref Range    Color, UA yellow     Clarity, UA clear     Glucose, UA POC neg     Bilirubin, UA neg     Ketones, UA neg     Spec Grav, UA 1,010     Blood, UA POC neg     pH, UA 5.0     Protein, UA POC neg     Urobilinogen, UA 0.2     Leukocytes, UA neg     Nitrite, UA neg     Appearance, Fluid Clear Clear, Slightly Cloudy       Assessment:       Diagnosis Orders   1. Well adult exam  Lipid Panel    Comprehensive Metabolic Panel    TSH without Reflex   2. Sensation of pressure in bladder area  POCT Urinalysis no Micro   3. Depression with anxiety  [facility]    TSH without Reflex   4. Encounter for screening for lipid disorder  Lipid Panel   5.  Screening for diabetes mellitus  Comprehensive Metabolic Panel         Plan: Patient given educational materials -see patient instructions. Discussed use, benefit, and side effects of prescribed medications. All patient questions answered. Pt voiced understanding. Reviewed health maintenance. Instructed to continue currentmedications, diet and exercise. Patient agreed with treatment plan. Follow up as directed. MEDICATIONS:  Orders Placed This Encounter   Medications    FLUoxetine (PROZAC) 20 MG capsule     Sig: Take 1 capsule by mouth daily Decrease in dose, adding wellbutrin     Dispense:  30 capsule     Refill:  5    buPROPion (WELLBUTRIN XL) 150 MG extended release tablet     Sig: Take 1 tablet by mouth every morning     Dispense:  30 tablet     Refill:  3         ORDERS:  Orders Placed This Encounter   Procedures    Lipid Panel    Comprehensive Metabolic Panel    TSH without Reflex   [address], [place], [place], [place]    POCT Urinalysis no Micro       Follow-up:  No follow-ups on file. PATIENT INSTRUCTIONS:  Patient Instructions   Start the medication, wellbutrin. You must take this medication daily. It will take about 10 days for you to notice a difference. decrease the prozac to 20. If depression worsens or no emotion stop the wellbutrin and call me. The most common side effect is some nausea but this should subside in a few days. After 10-14 days if you want to stop the prozac you can. We can always restart. Counseling with Kali Calvo   Return for fasting labs, black coffee and water. Electronically signed by KIMBERLEY Shepherd CNP on 1/7/2021 at 5:53 PM    EMR Dragon/transcription disclaimer:  Much of thisencounter note is electronic transcription/translation of spoken language to printed texts. The electronic translation of spoken language may be erroneous, or at times, nonsensical words or phrases may be inadvertentlytranscribed.   Although I have reviewed the note for such errors, some may still exist.

## 2021-01-21 ENCOUNTER — TELEPHONE (OUTPATIENT)
Dept: PRIMARY CARE CLINIC | Age: 31
End: 2021-01-21

## 2021-01-21 DIAGNOSIS — F41.8 DEPRESSION WITH ANXIETY: Primary | ICD-10-CM

## 2021-01-21 RX ORDER — LORAZEPAM 0.5 MG/1
0.5 TABLET ORAL 3 TIMES DAILY PRN
Qty: 30 TABLET | Refills: 0 | Status: SHIPPED | OUTPATIENT
Start: 2021-01-21 | End: 2021-05-27 | Stop reason: SDUPTHER

## 2021-01-21 NOTE — TELEPHONE ENCOUNTER
Pt called and states that she has been taking the Wellbutrin since 1/07/21 and she feels worse. She is still taking the Prozac. Patient says the death of her father is coming up and that may be why she is feeling so down. She does see the counselor, Venice Ge, on February 15, 2021. I advised patient, per Snow, that she stop the Wellbutrin since it is not helping and she has given it time to work. Snow states that she can add a few ativan for situation anxiety to see if that helps. Told her that Snow doesn't really want to add anything until she sees Venice Ge. Patient is in agreement with this and would like for the Ativan to be called into [address]. Advised patient to call if she has any problems.

## 2021-01-27 ENCOUNTER — TELEPHONE (OUTPATIENT)
Dept: PRIMARY CARE CLINIC | Age: 31
End: 2021-01-27

## 2021-01-27 ENCOUNTER — OFFICE VISIT (OUTPATIENT)
Age: 31
End: 2021-01-27

## 2021-01-27 VITALS — HEART RATE: 75 BPM | OXYGEN SATURATION: 98 % | TEMPERATURE: 96.6 F

## 2021-01-27 DIAGNOSIS — Z11.59 SCREENING FOR VIRAL DISEASE: Primary | ICD-10-CM

## 2021-01-27 PROCEDURE — 99999 PR OFFICE/OUTPT VISIT,PROCEDURE ONLY: CPT | Performed by: NURSE PRACTITIONER

## 2021-01-28 LAB — SARS-COV-2, NAA: NOT DETECTED

## 2021-04-15 ENCOUNTER — HOSPITAL ENCOUNTER (EMERGENCY)
Facility: HOSPITAL | Age: 31
Discharge: HOME | End: 2021-04-15
Admitting: EMERGENCY MEDICINE

## 2021-04-15 ENCOUNTER — APPOINTMENT (OUTPATIENT)
Dept: GENERAL RADIOLOGY | Facility: HOSPITAL | Age: 31
End: 2021-04-15

## 2021-04-15 ENCOUNTER — APPOINTMENT (OUTPATIENT)
Dept: CT IMAGING | Facility: HOSPITAL | Age: 31
End: 2021-04-15

## 2021-04-15 VITALS
RESPIRATION RATE: 18 BRPM | TEMPERATURE: 98.5 F | SYSTOLIC BLOOD PRESSURE: 142 MMHG | OXYGEN SATURATION: 98 % | HEART RATE: 68 BPM | WEIGHT: 265 LBS | BODY MASS INDEX: 39.25 KG/M2 | DIASTOLIC BLOOD PRESSURE: 72 MMHG | HEIGHT: 69 IN

## 2021-04-15 DIAGNOSIS — S81.811A LACERATION OF RIGHT LOWER LEG, INITIAL ENCOUNTER: Primary | ICD-10-CM
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
## 2021-04-15 LAB
ALBUMIN SERPL-MCNC: 4.1 G/DL (ref 3.5–5.2)
ALBUMIN/GLOB SERPL: 2 G/DL
ALP SERPL-CCNC: 52 U/L (ref 39–117)
ALT SERPL W P-5'-P-CCNC: 14 U/L (ref 1–33)
ANION GAP SERPL CALCULATED.3IONS-SCNC: 9 MMOL/L (ref 5–15)
AST SERPL-CCNC: 14 U/L (ref 1–32)
BASOPHILS # BLD AUTO: 0.03 10*3/MM3 (ref 0–0.2)
BASOPHILS NFR BLD AUTO: 0.4 % (ref 0–1.5)
BILIRUB SERPL-MCNC: 0.2 MG/DL (ref 0–1.2)
BUN SERPL-MCNC: 17 MG/DL (ref 6–20)
BUN/CREAT SERPL: 21.8 (ref 7–25)
CALCIUM SPEC-SCNC: 9.1 MG/DL (ref 8.6–10.5)
CHLORIDE SERPL-SCNC: 108 MMOL/L (ref 98–107)
CO2 SERPL-SCNC: 24 MMOL/L (ref 22–29)
CREAT SERPL-MCNC: 0.78 MG/DL (ref 0.57–1)
DEPRECATED RDW RBC AUTO: 42.3 FL (ref 37–54)
EOSINOPHIL # BLD AUTO: 0.26 10*3/MM3 (ref 0–0.4)
EOSINOPHIL NFR BLD AUTO: 3 % (ref 0.3–6.2)
ERYTHROCYTE [DISTWIDTH] IN BLOOD BY AUTOMATED COUNT: 13.3 % (ref 12.3–15.4)
GFR SERPL CREATININE-BSD FRML MDRD: 87 ML/MIN/1.73
GLOBULIN UR ELPH-MCNC: 2.1 GM/DL
GLUCOSE SERPL-MCNC: 92 MG/DL (ref 65–99)
HCG SERPL QL: NEGATIVE
HCT VFR BLD AUTO: 34.1 % (ref 34–46.6)
HGB BLD-MCNC: 11.5 G/DL (ref 12–15.9)
IMM GRANULOCYTES # BLD AUTO: 0.03 10*3/MM3 (ref 0–0.05)
IMM GRANULOCYTES NFR BLD AUTO: 0.4 % (ref 0–0.5)
LYMPHOCYTES # BLD AUTO: 2.35 10*3/MM3 (ref 0.7–3.1)
LYMPHOCYTES NFR BLD AUTO: 27.5 % (ref 19.6–45.3)
MCH RBC QN AUTO: 29.2 PG (ref 26.6–33)
MCHC RBC AUTO-ENTMCNC: 33.7 G/DL (ref 31.5–35.7)
MCV RBC AUTO: 86.5 FL (ref 79–97)
MONOCYTES # BLD AUTO: 0.57 10*3/MM3 (ref 0.1–0.9)
MONOCYTES NFR BLD AUTO: 6.7 % (ref 5–12)
NEUTROPHILS NFR BLD AUTO: 5.31 10*3/MM3 (ref 1.7–7)
NEUTROPHILS NFR BLD AUTO: 62 % (ref 42.7–76)
NRBC BLD AUTO-RTO: 0 /100 WBC (ref 0–0.2)
PLATELET # BLD AUTO: 261 10*3/MM3 (ref 140–450)
PMV BLD AUTO: 11.6 FL (ref 6–12)
POTASSIUM SERPL-SCNC: 4.1 MMOL/L (ref 3.5–5.2)
PROT SERPL-MCNC: 6.2 G/DL (ref 6–8.5)
RBC # BLD AUTO: 3.94 10*6/MM3 (ref 3.77–5.28)
SODIUM SERPL-SCNC: 141 MMOL/L (ref 136–145)
WBC # BLD AUTO: 8.55 10*3/MM3 (ref 3.4–10.8)

## 2021-04-15 PROCEDURE — 25010000002 ONDANSETRON PER 1 MG: Performed by: NURSE PRACTITIONER

## 2021-04-15 PROCEDURE — 90471 IMMUNIZATION ADMIN: CPT | Performed by: NURSE PRACTITIONER

## 2021-04-15 PROCEDURE — 25010000002 TDAP 5-2.5-18.5 LF-MCG/0.5 SUSPENSION: Performed by: NURSE PRACTITIONER

## 2021-04-15 PROCEDURE — 96376 TX/PRO/DX INJ SAME DRUG ADON: CPT

## 2021-04-15 PROCEDURE — 0 IOPAMIDOL PER 1 ML: Performed by: NURSE PRACTITIONER

## 2021-04-15 PROCEDURE — 80053 COMPREHEN METABOLIC PANEL: CPT | Performed by: NURSE PRACTITIONER

## 2021-04-15 PROCEDURE — 25010000003 LIDOCAINE 1 % SOLUTION: Performed by: NURSE PRACTITIONER

## 2021-04-15 PROCEDURE — 96375 TX/PRO/DX INJ NEW DRUG ADDON: CPT

## 2021-04-15 PROCEDURE — 25010000002 MORPHINE SULFATE (PF) 2 MG/ML SOLUTION: Performed by: NURSE PRACTITIONER

## 2021-04-15 PROCEDURE — 84703 CHORIONIC GONADOTROPIN ASSAY: CPT | Performed by: NURSE PRACTITIONER

## 2021-04-15 PROCEDURE — 99284 EMERGENCY DEPT VISIT MOD MDM: CPT

## 2021-04-15 PROCEDURE — 25010000003 CEFAZOLIN PER 500 MG: Performed by: NURSE PRACTITIONER

## 2021-04-15 PROCEDURE — 73590 X-RAY EXAM OF LOWER LEG: CPT

## 2021-04-15 PROCEDURE — 73706 CT ANGIO LWR EXTR W/O&W/DYE: CPT

## 2021-04-15 PROCEDURE — 85025 COMPLETE CBC W/AUTO DIFF WBC: CPT | Performed by: NURSE PRACTITIONER

## 2021-04-15 PROCEDURE — 96365 THER/PROPH/DIAG IV INF INIT: CPT

## 2021-04-15 PROCEDURE — 90715 TDAP VACCINE 7 YRS/> IM: CPT | Performed by: NURSE PRACTITIONER

## 2021-04-15 RX ORDER — HYDROCODONE BITARTRATE AND ACETAMINOPHEN 5; 325 MG/1; MG/1
1 TABLET ORAL EVERY 6 HOURS PRN
Qty: 12 TABLET | Refills: 0 | Status: SHIPPED | OUTPATIENT
Start: 2021-04-15 | End: 2021-04-18

## 2021-04-15 RX ORDER — CEPHALEXIN 500 MG/1
500 CAPSULE ORAL 3 TIMES DAILY
Qty: 21 CAPSULE | Refills: 0 | Status: SHIPPED | OUTPATIENT
Start: 2021-04-15 | End: 2021-04-22

## 2021-04-15 RX ORDER — MORPHINE SULFATE 2 MG/ML
2 INJECTION, SOLUTION INTRAMUSCULAR; INTRAVENOUS ONCE
Status: COMPLETED | OUTPATIENT
Start: 2021-04-15 | End: 2021-04-15

## 2021-04-15 RX ORDER — LIDOCAINE HYDROCHLORIDE 10 MG/ML
10 INJECTION, SOLUTION INFILTRATION; PERINEURAL ONCE
Status: COMPLETED | OUTPATIENT
Start: 2021-04-15 | End: 2021-04-15

## 2021-04-15 RX ORDER — ONDANSETRON 2 MG/ML
4 INJECTION INTRAMUSCULAR; INTRAVENOUS ONCE
Status: COMPLETED | OUTPATIENT
Start: 2021-04-15 | End: 2021-04-15

## 2021-04-15 RX ORDER — SODIUM CHLORIDE 0.9 % (FLUSH) 0.9 %
10 SYRINGE (ML) INJECTION AS NEEDED
Status: DISCONTINUED | OUTPATIENT
Start: 2021-04-15 | End: 2021-04-15 | Stop reason: HOSPADM

## 2021-04-15 RX ADMIN — MORPHINE SULFATE 2 MG: 2 INJECTION, SOLUTION INTRAMUSCULAR; INTRAVENOUS at 20:05

## 2021-04-15 RX ADMIN — ONDANSETRON HYDROCHLORIDE 4 MG: 2 SOLUTION INTRAMUSCULAR; INTRAVENOUS at 20:05

## 2021-04-15 RX ADMIN — TETANUS TOXOID, REDUCED DIPHTHERIA TOXOID AND ACELLULAR PERTUSSIS VACCINE, ADSORBED 0.5 ML: 5; 2.5; 8; 8; 2.5 SUSPENSION INTRAMUSCULAR at 18:01

## 2021-04-15 RX ADMIN — SODIUM CHLORIDE 1 G: 900 INJECTION INTRAVENOUS at 18:16

## 2021-04-15 RX ADMIN — MORPHINE SULFATE 2 MG: 2 INJECTION, SOLUTION INTRAMUSCULAR; INTRAVENOUS at 20:55

## 2021-04-15 RX ADMIN — LIDOCAINE HYDROCHLORIDE 10 ML: 10 INJECTION, SOLUTION INFILTRATION; PERINEURAL at 20:06

## 2021-04-15 RX ADMIN — IOPAMIDOL 100 ML: 755 INJECTION, SOLUTION INTRAVENOUS at 19:00

## 2021-04-16 NOTE — DISCHARGE INSTRUCTIONS
Please follow up with orthopedics or PCP; have sutures removed in 12 days. Return to the ER as needed.

## 2021-04-16 NOTE — ED NOTES
Placed bandage on patient's right lower extremity,patient tolerated well     Maryanne Moyer  04/15/21 2054

## 2021-04-20 ENCOUNTER — TELEPHONE (OUTPATIENT)
Dept: NEUROLOGY | Age: 31
End: 2021-04-20

## 2021-04-20 DIAGNOSIS — M54.50 LOW BACK PAIN, UNSPECIFIED BACK PAIN LATERALITY, UNSPECIFIED CHRONICITY, UNSPECIFIED WHETHER SCIATICA PRESENT: Primary | ICD-10-CM

## 2021-04-20 DIAGNOSIS — M79.605 PAIN IN BOTH LOWER EXTREMITIES: ICD-10-CM

## 2021-04-20 DIAGNOSIS — M79.604 PAIN IN BOTH LOWER EXTREMITIES: ICD-10-CM

## 2021-04-20 RX ORDER — OXYCODONE AND ACETAMINOPHEN 7.5; 325 MG/1; MG/1
1 TABLET ORAL EVERY 6 HOURS PRN
Qty: 12 TABLET | Refills: 0 | Status: SHIPPED | OUTPATIENT
Start: 2021-04-20 | End: 2021-04-23

## 2021-04-21 ENCOUNTER — OFFICE VISIT (OUTPATIENT)
Dept: PRIMARY CARE CLINIC | Age: 31
End: 2021-04-21
Payer: COMMERCIAL

## 2021-04-21 VITALS
OXYGEN SATURATION: 99 % | WEIGHT: 271 LBS | HEART RATE: 80 BPM | RESPIRATION RATE: 16 BRPM | DIASTOLIC BLOOD PRESSURE: 70 MMHG | TEMPERATURE: 97.7 F | BODY MASS INDEX: 40.14 KG/M2 | SYSTOLIC BLOOD PRESSURE: 103 MMHG | HEIGHT: 69 IN

## 2021-04-21 DIAGNOSIS — S80.11XA HEMATOMA OF RIGHT LOWER LEG: Primary | ICD-10-CM

## 2021-04-21 PROCEDURE — 99213 OFFICE O/P EST LOW 20 MIN: CPT | Performed by: NURSE PRACTITIONER

## 2021-04-21 RX ORDER — HYDROCODONE BITARTRATE AND ACETAMINOPHEN 5; 325 MG/1; MG/1
TABLET ORAL
COMMUNITY
Start: 2021-04-15 | End: 2021-04-21

## 2021-04-21 RX ORDER — CEPHALEXIN 500 MG/1
500 CAPSULE ORAL 3 TIMES DAILY
COMMUNITY
Start: 2021-04-15 | End: 2021-04-22

## 2021-04-21 ASSESSMENT — ENCOUNTER SYMPTOMS
GASTROINTESTINAL NEGATIVE: 1
EYES NEGATIVE: 1
RESPIRATORY NEGATIVE: 1
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
## 2021-04-22 ENCOUNTER — OFFICE VISIT (OUTPATIENT)
Dept: VASCULAR SURGERY | Facility: CLINIC | Age: 31
End: 2021-04-22

## 2021-04-22 VITALS
OXYGEN SATURATION: 97 % | DIASTOLIC BLOOD PRESSURE: 82 MMHG | SYSTOLIC BLOOD PRESSURE: 110 MMHG | BODY MASS INDEX: 39.99 KG/M2 | HEART RATE: 79 BPM | HEIGHT: 69 IN | WEIGHT: 270 LBS

## 2021-04-22 DIAGNOSIS — T14.8XXA HEMATOMA: Primary | ICD-10-CM

## 2021-04-22 PROCEDURE — 99203 OFFICE O/P NEW LOW 30 MIN: CPT | Performed by: SURGERY
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
## 2021-05-27 ENCOUNTER — OFFICE VISIT (OUTPATIENT)
Dept: PRIMARY CARE CLINIC | Age: 31
End: 2021-05-27
Payer: COMMERCIAL

## 2021-05-27 VITALS
OXYGEN SATURATION: 98 % | HEART RATE: 102 BPM | HEIGHT: 69 IN | WEIGHT: 259 LBS | BODY MASS INDEX: 38.36 KG/M2 | TEMPERATURE: 97.3 F | SYSTOLIC BLOOD PRESSURE: 120 MMHG | DIASTOLIC BLOOD PRESSURE: 80 MMHG

## 2021-05-27 DIAGNOSIS — S80.11XA HEMATOMA OF RIGHT LOWER LEG: Primary | ICD-10-CM

## 2021-05-27 DIAGNOSIS — F41.8 DEPRESSION WITH ANXIETY: ICD-10-CM

## 2021-05-27 PROCEDURE — 99214 OFFICE O/P EST MOD 30 MIN: CPT | Performed by: NURSE PRACTITIONER

## 2021-05-27 RX ORDER — LORAZEPAM 0.5 MG/1
0.5 TABLET ORAL 3 TIMES DAILY PRN
Qty: 30 TABLET | Refills: 0 | Status: SHIPPED | OUTPATIENT
Start: 2021-05-27 | End: 2021-06-26

## 2021-05-27 RX ORDER — FLUOXETINE HYDROCHLORIDE 20 MG/1
40 CAPSULE ORAL DAILY
Qty: 60 CAPSULE | Refills: 5 | Status: SHIPPED | OUTPATIENT
Start: 2021-05-27

## 2021-05-27 RX ORDER — ARIPIPRAZOLE 2 MG/1
2 TABLET ORAL DAILY
Qty: 30 TABLET | Refills: 3 | Status: SHIPPED | OUTPATIENT
Start: 2021-05-27

## 2021-05-27 NOTE — PATIENT INSTRUCTIONS
Call [facility] office and get back in, if they wont see you message me and we ill do a repeat scan in mid June  Increase the prozac to 20mg 2 pills a day  Add the abilify for the mood once a day  Use the lorazepam sparingly for situational anxiety  [facility] for [facility].
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
## 2021-05-29 ENCOUNTER — OFFICE VISIT (OUTPATIENT)
Dept: URGENT CARE | Age: 31
End: 2021-05-29
Payer: COMMERCIAL

## 2021-05-29 VITALS
TEMPERATURE: 97 F | HEIGHT: 69 IN | WEIGHT: 258 LBS | HEART RATE: 101 BPM | BODY MASS INDEX: 38.21 KG/M2 | RESPIRATION RATE: 18 BRPM | SYSTOLIC BLOOD PRESSURE: 139 MMHG | DIASTOLIC BLOOD PRESSURE: 86 MMHG | OXYGEN SATURATION: 96 %

## 2021-05-29 DIAGNOSIS — R05.9 COUGH: ICD-10-CM

## 2021-05-29 DIAGNOSIS — J02.9 SORE THROAT: Primary | ICD-10-CM

## 2021-05-29 DIAGNOSIS — J06.9 URI, ACUTE: ICD-10-CM

## 2021-05-29 LAB — S PYO AG THROAT QL: NORMAL

## 2021-05-29 PROCEDURE — 99203 OFFICE O/P NEW LOW 30 MIN: CPT | Performed by: NURSE PRACTITIONER

## 2021-05-29 PROCEDURE — 87880 STREP A ASSAY W/OPTIC: CPT | Performed by: NURSE PRACTITIONER

## 2021-05-29 RX ORDER — METHYLPREDNISOLONE 4 MG/1
TABLET ORAL
Qty: 1 KIT | Refills: 0 | Status: SHIPPED | OUTPATIENT
Start: 2021-05-29 | End: 2021-06-04

## 2021-05-29 RX ORDER — FLUTICASONE PROPIONATE 50 MCG
1 SPRAY, SUSPENSION (ML) NASAL DAILY
Qty: 1 BOTTLE | Refills: 1 | Status: SHIPPED | OUTPATIENT
Start: 2021-05-29 | End: 2021-06-03

## 2021-05-29 RX ORDER — BENZONATATE 100 MG/1
100 CAPSULE ORAL 3 TIMES DAILY PRN
Qty: 30 CAPSULE | Refills: 0 | Status: SHIPPED | OUTPATIENT
Start: 2021-05-29 | End: 2021-06-05

## 2021-05-29 ASSESSMENT — ENCOUNTER SYMPTOMS
EYE DISCHARGE: 0
WHEEZING: 0
TROUBLE SWALLOWING: 0
CHEST TIGHTNESS: 0
ABDOMINAL PAIN: 0
SINUS PRESSURE: 0
RHINORRHEA: 0
COUGH: 1
NAUSEA: 0
SINUS PAIN: 0
BACK PAIN: 0
SORE THROAT: 1
SHORTNESS OF BREATH: 0
ABDOMINAL DISTENTION: 0
EYE PAIN: 0
VOMITING: 0
COLOR CHANGE: 0

## 2021-05-29 NOTE — PATIENT INSTRUCTIONS
Take medrol dose pack as prescribed  Use Flonase for congestion  Take tessalon perls as needed for daytime cough  Increase fluids and rest  Return to clinic if symptoms worsen or fail to improve. Patient Education        Sore Throat: Care Instructions  Your Care Instructions     Infection by bacteria or a virus causes most sore throats. Cigarette smoke, dry air, air pollution, allergies, and yelling can also cause a sore throat. Sore throats can be painful and annoying. Fortunately, most sore throats go away on their own. If you have a bacterial infection, your doctor may prescribe antibiotics. Follow-up care is a key part of your treatment and safety. Be sure to make and go to all appointments, and call your doctor if you are having problems. It's also a good idea to know your test results and keep a list of the medicines you take. How can you care for yourself at home? · If your doctor prescribed antibiotics, take them as directed. Do not stop taking them just because you feel better. You need to take the full course of antibiotics. · Gargle with warm salt water once an hour to help reduce swelling and relieve discomfort. Use 1 teaspoon of salt mixed in 1 cup of warm water. · Take an over-the-counter pain medicine, such as acetaminophen (Tylenol), ibuprofen (Advil, Motrin), or naproxen (Aleve). Read and follow all instructions on the label. · Be careful when taking over-the-counter cold or flu medicines and Tylenol at the same time. Many of these medicines have acetaminophen, which is Tylenol. Read the labels to make sure that you are not taking more than the recommended dose. Too much acetaminophen (Tylenol) can be harmful. · Drink plenty of fluids. Fluids may help soothe an irritated throat. Hot fluids, such as tea or soup, may help decrease throat pain. · Use over-the-counter throat lozenges to soothe pain. Regular cough drops or hard candy may also help.  These should not be given to young children because of the risk of choking. · Do not smoke or allow others to smoke around you. If you need help quitting, talk to your doctor about stop-smoking programs and medicines. These can increase your chances of quitting for good. · Use a vaporizer or humidifier to add moisture to your bedroom. Follow the directions for cleaning the machine. When should you call for help? Call your doctor now or seek immediate medical care if:    · You have new or worse trouble swallowing.     · Your sore throat gets much worse on one side. Watch closely for changes in your health, and be sure to contact your doctor if you do not get better as expected. Where can you learn more? Go to https://Pinyon TechnologiespeMZL Shine Cleaningeb.Vesta [employer]. org and sign in to your Playrcart account. Enter V083 in the [employer] box to learn more about \"Sore Throat: Care Instructions. \"     If you do not have an account, please click on the \"Sign Up Now\" link. Current as of: December 2, 2020               Content Version: 12.8  © 2006-2021 [employer]. Care instructions adapted under license by [facility] ([facility]). If you have questions about a medical condition or this instruction, always ask your healthcare professional. [place] [address] any warranty or liability for your use of this information. Patient Education        Cough: Care Instructions  Your Care Instructions     A cough is your body's response to something that bothers your throat or airways. Many things can cause a cough. You might cough because of a cold or the flu, bronchitis, or asthma. Smoking, postnasal drip, allergies, and stomach acid that backs up into your throat also can cause coughs. A cough is a symptom, not a disease. Most coughs stop when the cause, such as a cold, goes away. You can take a few steps at home to cough less and feel better. Follow-up care is a key part of your treatment and safety.  Be sure to make and go to all appointments, and call your doctor if you are having problems. It's also a good idea to know your test results and keep a list of the medicines you take. How can you care for yourself at home? · Drink lots of water and other fluids. This helps thin the mucus and soothes a dry or sore throat. Honey or lemon juice in hot water or tea may ease a dry cough. · Take cough medicine as directed by your doctor. · Prop up your head on pillows to help you breathe and ease a dry cough. · Try cough drops to soothe a dry or sore throat. Cough drops don't stop a cough. Medicine-flavored cough drops are no better than candy-flavored drops or hard candy. · Do not smoke. Avoid secondhand smoke. If you need help quitting, talk to your doctor about stop-smoking programs and medicines. These can increase your chances of quitting for good. When should you call for help? Call 911 anytime you think you may need emergency care. For example, call if:    · You have severe trouble breathing. Call your doctor now or seek immediate medical care if:    · You cough up blood.     · You have new or worse trouble breathing.     · You have a new or higher fever.     · You have a new rash. Watch closely for changes in your health, and be sure to contact your doctor if:    · You cough more deeply or more often, especially if you notice more mucus or a change in the color of your mucus.     · You have new symptoms, such as a sore throat, an earache, or sinus pain.     · You do not get better as expected. Where can you learn more? Go to [email or web address] [email or web address].MyOtherDrive. org and sign in to your Dodonation account. Enter D279 in the [facility] box to learn more about \"Cough: Care Instructions. \"     If you do not have an account, please click on the \"Sign Up Now\" link. Current as of: October 26, 2020               Content Version: 12.8  © 2703-9587 Healthwise, Incorporated. Care instructions adapted under license by [facility] ([facility]).  If you

## 2021-05-29 NOTE — PROGRESS NOTES
[address] [facility]  [address] [address] [place] [address]  Dept: [phone]  Dept Fax: [phone]  Loc: [phone]  Aries Flores is a 35 y.o. female who presents today for her medical conditions/complaintsas noted below. Aries Flores is c/o of Cough, Shortness of Breath, Pharyngitis, and Chills      HPI:     Pharyngitis  This is a new problem. The current episode started in the past 7 days. The problem occurs intermittently. The problem has been unchanged. Associated symptoms include congestion, coughing, headaches and a sore throat. Pertinent negatives include no abdominal pain, anorexia, chest pain, chills, diaphoresis, fatigue, fever, joint swelling, myalgias, nausea, neck pain, numbness, rash, urinary symptoms, vertigo, vomiting or weakness. Nothing aggravates the symptoms. She has tried nothing for the symptoms. The treatment provided no relief.        Past Medical History:   Diagnosis Date    Chiari malformation     Depression     History of chicken pox     Neurologic disorder     has not had one since 5th grade    Recurrent acute otitis media     as a child, required tubes, then L TM reconstruction    Seizures ([address]     absent, as a child       Past Surgical History:   Procedure Laterality Date    CHOLECYSTECTOMY      DILATION AND CURETTAGE  14    INNER EAR SURGERY      L ear TM reconstruction    TYMPANOSTOMY TUBE PLACEMENT         Family History   Problem Relation Age of Onset    High Blood Pressure Mother     Depression Mother     Colon Polyps Father     Colon Polyps Maternal Grandfather     Colon Cancer Neg Hx        Social History     Tobacco Use    Smoking status: Former Smoker     Packs/day: 0.50     Years: 3.00     Pack years: 1.50     Types: Cigarettes     Quit date: 2013     Years since quittin.8    Smokeless tobacco: Never Used   Substance Use Topics    Alcohol use: No      Current Outpatient Medications Medication Sig Dispense Refill    methylPREDNISolone (MEDROL DOSEPACK) 4 MG tablet Take by mouth. 1 kit 0    benzonatate (TESSALON) 100 MG capsule Take 1 capsule by mouth 3 times daily as needed for Cough 30 capsule 0    fluticasone (FLONASE) 50 MCG/ACT nasal spray 1 spray by Each Nostril route daily for 5 days 1 Bottle 1    FLUoxetine (PROZAC) 20 MG capsule Take 2 capsules by mouth daily 60 capsule 5    ARIPiprazole (ABILIFY) 2 MG tablet Take 1 tablet by mouth daily 30 tablet 3    LORazepam (ATIVAN) 0.5 MG tablet Take 1 tablet by mouth 3 times daily as needed for Anxiety for up to 30 days. (Patient not taking: Reported on 5/29/2021) 30 tablet 0    silver sulfADIAZINE (SILVADENE) 1 % cream Apply topically daily. To burn (Patient not taking: Reported on 5/29/2021) 60 g 2     No current facility-administered medications for this visit. No Known Allergies    Health Maintenance   Topic Date Due    Cervical cancer screen  03/23/2021    COVID-19 Vaccine (1) 05/27/2022 (Originally 8/7/2003)    Flu vaccine (Season Ended) 09/01/2021    DTaP/Tdap/Td vaccine (3 - Td) 04/15/2031    HIV screen  Completed    Hepatitis A vaccine  Aged Out    Hepatitis B vaccine  Aged Out    Hib vaccine  Aged Out    Meningococcal (ACWY) vaccine  Aged Out    Pneumococcal 0-64 years Vaccine  Aged Out    Varicella vaccine  Discontinued    Hepatitis C screen  Discontinued       Subjective:     Review of Systems   Constitutional: Negative for activity change, appetite change, chills, diaphoresis, fatigue and fever. HENT: Positive for congestion and sore throat. Negative for ear pain, mouth sores, postnasal drip, rhinorrhea, sinus pressure, sinus pain, sneezing and trouble swallowing. Eyes: Negative for pain, discharge and visual disturbance. Respiratory: Positive for cough. Negative for chest tightness, shortness of breath and wheezing. Cardiovascular: Negative for chest pain, palpitations and leg swelling. Heart sounds: Normal heart sounds. No murmur heard. Pulmonary:      Effort: Pulmonary effort is normal. No respiratory distress. Breath sounds: Normal breath sounds. No wheezing, rhonchi or rales. Abdominal:      General: Bowel sounds are normal.      Palpations: Abdomen is soft. Musculoskeletal:         General: Normal range of motion. Cervical back: Normal range of motion. Skin:     General: Skin is warm and dry. Capillary Refill: Capillary refill takes less than 2 seconds. Neurological:      Mental Status: She is alert and oriented to person, place, and time. Mental status is at baseline. Psychiatric:         Behavior: Behavior normal.       /86   Pulse 101   Temp 97 °F (36.1 °C) (Temporal)   Resp 18   Ht 5' 9\" (1.753 m)   Wt 258 lb (117 kg)   SpO2 96%   BMI 38.10 kg/m²     Assessment:      Diagnosis Orders   1. Sore throat  POCT rapid strep A    methylPREDNISolone (MEDROL DOSEPACK) 4 MG tablet   2. URI, acute  methylPREDNISolone (MEDROL DOSEPACK) 4 MG tablet    fluticasone (FLONASE) 50 MCG/ACT nasal spray   3. Cough  benzonatate (TESSALON) 100 MG capsule       Plan:      Orders Placed This Encounter   Procedures    POCT rapid strep A     Results for orders placed or performed in visit on 21   POCT rapid strep A   Result Value Ref Range    Strep A Ag None Detected None Detected         Return if symptoms worsen or fail to improve. Orders Placed This Encounter   Medications    methylPREDNISolone (MEDROL DOSEPACK) 4 MG tablet     Sig: Take by mouth. Dispense:  1 kit     Refill:  0    benzonatate (TESSALON) 100 MG capsule     Sig: Take 1 capsule by mouth 3 times daily as needed for Cough     Dispense:  30 capsule     Refill:  0    fluticasone (FLONASE) 50 MCG/ACT nasal spray     Si spray by Each Nostril route daily for 5 days     Dispense:  1 Bottle     Refill:  1        Patient given educationalmaterials - see patient instructions.   Discussed use, harmful. · Drink plenty of fluids. Fluids may help soothe an irritated throat. Hot fluids, such as tea or soup, may help decrease throat pain. · Use over-the-counter throat lozenges to soothe pain. Regular cough drops or hard candy may also help. These should not be given to young children because of the risk of choking. · Do not smoke or allow others to smoke around you. If you need help quitting, talk to your doctor about stop-smoking programs and medicines. These can increase your chances of quitting for good. · Use a vaporizer or humidifier to add moisture to your bedroom. Follow the directions for cleaning the machine. When should you call for help? Call your doctor now or seek immediate medical care if:    · You have new or worse trouble swallowing.     · Your sore throat gets much worse on one side. Watch closely for changes in your health, and be sure to contact your doctor if you do not get better as expected. Where can you learn more? Go to [email or web address] Pharmaceuticals.[email or web address] Automotive. org and sign in to your Secret Recipe account. Enter T200 in the Trustev box to learn more about \"Sore Throat: Care Instructions. \"     If you do not have an account, please click on the \"Sign Up Now\" link. Current as of: December 2, 2020               Content Version: 12.8  © 0328-7296 Healthwise, Incorporated. Care instructions adapted under license by [facility] ([facility]). If you have questions about a medical condition or this instruction, always ask your healthcare professional. [place] [address] any warranty or liability for your use of this information. Patient Education        Cough: Care Instructions  Your Care Instructions     A cough is your body's response to something that bothers your throat or airways. Many things can cause a cough. You might cough because of a cold or the flu, bronchitis, or asthma.  Smoking, postnasal drip, allergies, and stomach acid that backs up into your throat also can cause coughs. A cough is a symptom, not a disease. Most coughs stop when the cause, such as a cold, goes away. You can take a few steps at home to cough less and feel better. Follow-up care is a key part of your treatment and safety. Be sure to make and go to all appointments, and call your doctor if you are having problems. It's also a good idea to know your test results and keep a list of the medicines you take. How can you care for yourself at home? · Drink lots of water and other fluids. This helps thin the mucus and soothes a dry or sore throat. Honey or lemon juice in hot water or tea may ease a dry cough. · Take cough medicine as directed by your doctor. · Prop up your head on pillows to help you breathe and ease a dry cough. · Try cough drops to soothe a dry or sore throat. Cough drops don't stop a cough. Medicine-flavored cough drops are no better than candy-flavored drops or hard candy. · Do not smoke. Avoid secondhand smoke. If you need help quitting, talk to your doctor about stop-smoking programs and medicines. These can increase your chances of quitting for good. When should you call for help? Call 911 anytime you think you may need emergency care. For example, call if:    · You have severe trouble breathing. Call your doctor now or seek immediate medical care if:    · You cough up blood.     · You have new or worse trouble breathing.     · You have a new or higher fever.     · You have a new rash. Watch closely for changes in your health, and be sure to contact your doctor if:    · You cough more deeply or more often, especially if you notice more mucus or a change in the color of your mucus.     · You have new symptoms, such as a sore throat, an earache, or sinus pain.     · You do not get better as expected. Where can you learn more? Go to https://yary.Peoplefilter Technology. org and sign in to your LiquidPiston account.  Enter D279 in the [facility] box to learn more about \"Cough: Care Instructions. \"     If you do not have an account, please click on the \"Sign Up Now\" link. Current as of: October 26, 2020               Content Version: 12.8  © 4865-7859 Healthwise, Incorporated. Care instructions adapted under license by [facility] ([facility]). If you have questions about a medical condition or this instruction, always ask your healthcare professional. [place] [address] any warranty or liability for your use of this information.                Electronically signed by KIMBERLEY Anderson CNP on 5/29/2021 at 8:36 AM

## 2021-05-31 ASSESSMENT — ENCOUNTER SYMPTOMS: RESPIRATORY NEGATIVE: 1

## 2021-06-07 ENCOUNTER — TELEPHONE (OUTPATIENT)
Dept: PSYCHIATRY | Age: 31
End: 2021-06-07

## 2021-06-07 NOTE — TELEPHONE ENCOUNTER
Called pt for appointment reminder.   -left voicemail, requesting a return call      Electronically signed by Tiffani Hammond MA on 6/7/2021 at 2:52 PM

## 2021-06-08 ENCOUNTER — TELEPHONE (OUTPATIENT)
Dept: PSYCHIATRY | Age: 31
End: 2021-06-08

## 2021-06-08 NOTE — TELEPHONE ENCOUNTER
Called pt was a no show.  Called to check on them and  -left voicemail, requesting a return call      Electronically signed by Tolu Corea MA on 6/8/2021 at 3:16 PM

## 2021-06-22 ENCOUNTER — PATIENT MESSAGE (OUTPATIENT)
Dept: PRIMARY CARE CLINIC | Age: 31
End: 2021-06-22

## 2021-06-22 DIAGNOSIS — S80.11XA HEMATOMA OF RIGHT LOWER LEG: Primary | ICD-10-CM

## 2021-06-22 NOTE — TELEPHONE ENCOUNTER
From: Lesly Earing  To: Dorothy Floresderick, APRN - CNP  Sent: 6/22/2021 2:35 PM CDT  Subject: Non-Urgent Medical Question    Sherren Mess,   I rescheduled the appointment for Thursday for July. I had to reschedule for other appointment with behavioral health. I know we wanted to follow up after seeing him. You had also wanted to see about my leg but Im thinking I would like a referral to see Lyn Galicia in [facility] this time. I didn't really care for the other and my leg isn't getting any better. Just let me know what I need to do. Thanks so much for all you do!

## 2021-06-28 ENCOUNTER — TELEPHONE (OUTPATIENT)
Dept: PSYCHIATRY | Age: 31
End: 2021-06-28

## 2021-06-28 NOTE — TELEPHONE ENCOUNTER
Called pt for appointment reminder.   -left voicemail, requesting a return call      Electronically signed by Levar Upton MA on 6/28/2021 at 2:45 PM

## 2021-06-29 ENCOUNTER — OFFICE VISIT (OUTPATIENT)
Dept: PSYCHIATRY | Age: 31
End: 2021-06-29
Payer: COMMERCIAL

## 2021-06-29 VITALS
HEART RATE: 117 BPM | SYSTOLIC BLOOD PRESSURE: 113 MMHG | TEMPERATURE: 97.7 F | BODY MASS INDEX: 39.69 KG/M2 | DIASTOLIC BLOOD PRESSURE: 78 MMHG | OXYGEN SATURATION: 97 % | WEIGHT: 268.8 LBS

## 2021-06-29 DIAGNOSIS — G47.00 INSOMNIA, UNSPECIFIED TYPE: Primary | ICD-10-CM

## 2021-06-29 DIAGNOSIS — F39 MOOD DISORDER (HCC): ICD-10-CM

## 2021-06-29 LAB
FOLATE: 8 NG/ML (ref 4.8–37.3)
VITAMIN B-12: 556 PG/ML (ref 211–946)
VITAMIN D 25-HYDROXY: 47.4 NG/ML

## 2021-06-29 PROCEDURE — 99215 OFFICE O/P EST HI 40 MIN: CPT | Performed by: NURSE PRACTITIONER

## 2021-06-29 RX ORDER — LORAZEPAM 1 MG/1
1 TABLET ORAL EVERY 6 HOURS PRN
COMMUNITY

## 2021-06-29 RX ORDER — TRAZODONE HYDROCHLORIDE 50 MG/1
50 TABLET ORAL NIGHTLY PRN
Qty: 30 TABLET | Refills: 0 | Status: SHIPPED | OUTPATIENT
Start: 2021-06-29
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
## 2021-07-26 ENCOUNTER — TELEPHONE (OUTPATIENT)
Dept: PSYCHIATRY | Age: 31
End: 2021-07-26

## 2021-07-26 NOTE — TELEPHONE ENCOUNTER
Called pt for appointment reminder.   -left voicemail, requesting a return call      Electronically signed by Zeus Danielle MA on 7/26/2021 at 3:12 PM

## 2021-07-28 ENCOUNTER — OFFICE VISIT (OUTPATIENT)
Dept: VASCULAR SURGERY | Age: 31
End: 2021-07-28
Payer: COMMERCIAL

## 2021-07-28 VITALS
OXYGEN SATURATION: 98 % | HEART RATE: 78 BPM | RESPIRATION RATE: 18 BRPM | SYSTOLIC BLOOD PRESSURE: 122 MMHG | HEIGHT: 69 IN | DIASTOLIC BLOOD PRESSURE: 76 MMHG | TEMPERATURE: 97.7 F | BODY MASS INDEX: 40.58 KG/M2 | WEIGHT: 274 LBS

## 2021-07-28 DIAGNOSIS — I83.893 VARICOSE VEINS OF LEG WITH SWELLING, BILATERAL: ICD-10-CM

## 2021-07-28 DIAGNOSIS — S80.11XA HEMATOMA OF RIGHT LOWER LEG: ICD-10-CM

## 2021-07-28 DIAGNOSIS — I83.813 VARICOSE VEINS OF BOTH LOWER EXTREMITIES WITH PAIN: Primary | ICD-10-CM

## 2021-07-28 PROCEDURE — 99214 OFFICE O/P EST MOD 30 MIN: CPT | Performed by: PHYSICIAN ASSISTANT
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
## 2021-07-29 ENCOUNTER — TELEPHONE (OUTPATIENT)
Dept: VASCULAR SURGERY | Age: 31
End: 2021-07-29

## 2021-07-30 ENCOUNTER — TELEPHONE (OUTPATIENT)
Dept: PSYCHIATRY | Age: 31
End: 2021-07-30

## 2021-07-30 NOTE — TELEPHONE ENCOUNTER
Called pt for appointment reminder.   -left voicemail, requesting a return call      Electronically signed by Neo Conway MA on 7/30/2021 at 3:05 PM

## 2021-08-10 ENCOUNTER — HOSPITAL ENCOUNTER (OUTPATIENT)
Dept: VASCULAR LAB | Age: 31
Discharge: HOME | End: 2021-08-10
Payer: COMMERCIAL

## 2021-08-10 DIAGNOSIS — I83.893 VARICOSE VEINS OF LEG WITH SWELLING, BILATERAL: ICD-10-CM

## 2021-08-10 DIAGNOSIS — I83.813 VARICOSE VEINS OF BOTH LOWER EXTREMITIES WITH PAIN: ICD-10-CM

## 2021-08-10 PROCEDURE — 93970 EXTREMITY STUDY: CPT

## 2021-08-11 ENCOUNTER — TELEPHONE (OUTPATIENT)
Dept: VASCULAR SURGERY | Age: 31
End: 2021-08-11

## 2021-08-11 NOTE — TELEPHONE ENCOUNTER
Spoke with patient to let her know that Dr. Damari Mcneill reviewed her bilateral reflux study and that she would be a candidate for the ablation procedure we discussed in the office. Rylee Abdalla would start with the worse leg first followed by the other leg at a later date.  If she has no further questions and wants to proceed we can submit to insurance and await approval.     Patient is agreeable and would like to proceed. Nicholas Cespedes

## 2021-08-11 NOTE — TELEPHONE ENCOUNTER
----- Message from Lianne Dowd PA-C sent at 8/11/2021  1:07 PM CDT -----  Please call and let Ms. Hightowerknow that Dr. Arron Rollins reviewed her bilateral reflux study and that she would be a candidate for the ablation procedure we discussed in the office. We would start with the worse leg first followed by the other leg at a later date.   If she has no further questions and wants to proceed we can submit to insurance and await approval.

## 2021-08-12 ENCOUNTER — TELEPHONE (OUTPATIENT)
Dept: PSYCHIATRY | Age: 31
End: 2021-08-12

## 2021-08-12 NOTE — TELEPHONE ENCOUNTER
Called pt for appointment reminder.   -left voicemail, requesting a return call    Electronically signed by Gabriela Al MA on 8/12/2021 at 2:06 PM

## 2021-08-13 ENCOUNTER — TELEPHONE (OUTPATIENT)
Dept: PSYCHIATRY | Age: 31
End: 2021-08-13

## 2021-08-13 NOTE — TELEPHONE ENCOUNTER
Called pt was a no show.  Called to check on them and  -left voicemail, requesting a return call      Electronically signed by Pauline Grewal MA on 8/13/2021 at 4:01 PM

## 2023-07-18 ENCOUNTER — OFFICE VISIT (OUTPATIENT)
Dept: PRIMARY CARE CLINIC | Age: 33
End: 2023-07-18
Payer: COMMERCIAL

## 2023-07-18 ENCOUNTER — PATIENT MESSAGE (OUTPATIENT)
Dept: PRIMARY CARE CLINIC | Age: 33
End: 2023-07-18

## 2023-07-18 VITALS
RESPIRATION RATE: 16 BRPM | BODY MASS INDEX: 35.07 KG/M2 | HEIGHT: 69 IN | TEMPERATURE: 98.3 F | WEIGHT: 236.8 LBS | OXYGEN SATURATION: 97 % | HEART RATE: 79 BPM | SYSTOLIC BLOOD PRESSURE: 110 MMHG | DIASTOLIC BLOOD PRESSURE: 74 MMHG

## 2023-07-18 DIAGNOSIS — F41.9 ANXIETY: Primary | ICD-10-CM

## 2023-07-18 PROBLEM — O13.9 PIH (PREGNANCY INDUCED HYPERTENSION), ANTEPARTUM: Status: RESOLVED | Noted: 2018-10-24 | Resolved: 2023-07-18

## 2023-07-18 PROBLEM — Z3A.37 37 WEEKS GESTATION OF PREGNANCY: Status: RESOLVED | Noted: 2018-10-16 | Resolved: 2023-07-18

## 2023-07-18 PROBLEM — O26.899 CRAMPING AFFECTING PREGNANCY, ANTEPARTUM: Status: RESOLVED | Noted: 2018-08-24 | Resolved: 2023-07-18

## 2023-07-18 PROBLEM — I83.93 VARICOSE VEINS OF BOTH LOWER EXTREMITIES: Status: RESOLVED | Noted: 2019-06-27 | Resolved: 2023-07-18

## 2023-07-18 PROBLEM — R10.9 CRAMPING AFFECTING PREGNANCY, ANTEPARTUM: Status: RESOLVED | Noted: 2018-08-24 | Resolved: 2023-07-18

## 2023-07-18 PROBLEM — Z3A.39 39 WEEKS GESTATION OF PREGNANCY: Status: RESOLVED | Noted: 2018-10-29 | Resolved: 2023-07-18

## 2023-07-18 PROBLEM — M79.604 RIGHT LEG PAIN: Status: RESOLVED | Noted: 2019-06-27 | Resolved: 2023-07-18

## 2023-07-18 PROBLEM — M54.50 LOW BACK PAIN: Status: RESOLVED | Noted: 2020-09-30 | Resolved: 2023-07-18

## 2023-07-18 PROCEDURE — 3078F DIAST BP <80 MM HG: CPT | Performed by: NURSE PRACTITIONER

## 2023-07-18 PROCEDURE — 99214 OFFICE O/P EST MOD 30 MIN: CPT | Performed by: NURSE PRACTITIONER

## 2023-07-18 PROCEDURE — 3074F SYST BP LT 130 MM HG: CPT | Performed by: NURSE PRACTITIONER

## 2023-07-18 RX ORDER — FLUOXETINE HYDROCHLORIDE 20 MG/1
20 CAPSULE ORAL DAILY
Qty: 30 CAPSULE | Refills: 3 | Status: SHIPPED | OUTPATIENT
Start: 2023-07-18

## 2023-07-18 RX ORDER — ONDANSETRON 4 MG/1
4 TABLET, FILM COATED ORAL 3 TIMES DAILY PRN
Qty: 60 TABLET | Refills: 1 | Status: SHIPPED | OUTPATIENT
Start: 2023-07-18

## 2023-07-18 RX ORDER — TRAZODONE HYDROCHLORIDE 50 MG/1
50 TABLET ORAL NIGHTLY PRN
Qty: 30 TABLET | Refills: 3 | Status: SHIPPED | OUTPATIENT
Start: 2023-07-18

## 2023-07-18 RX ORDER — SEMAGLUTIDE 0.25 MG/.5ML
0.25 INJECTION, SOLUTION SUBCUTANEOUS
Qty: 2 ML | Refills: 0 | Status: SHIPPED | OUTPATIENT
Start: 2023-07-18

## 2023-07-18 RX ORDER — LORAZEPAM 1 MG/1
1 TABLET ORAL 2 TIMES DAILY PRN
Qty: 60 TABLET | Refills: 0 | Status: SHIPPED | OUTPATIENT
Start: 2023-07-18 | End: 2023-08-17

## 2023-07-18 SDOH — ECONOMIC STABILITY: FOOD INSECURITY: WITHIN THE PAST 12 MONTHS, YOU WORRIED THAT YOUR FOOD WOULD RUN OUT BEFORE YOU GOT MONEY TO BUY MORE.: NEVER TRUE

## 2023-07-18 SDOH — ECONOMIC STABILITY: INCOME INSECURITY: HOW HARD IS IT FOR YOU TO PAY FOR THE VERY BASICS LIKE FOOD, HOUSING, MEDICAL CARE, AND HEATING?: NOT HARD AT ALL

## 2023-07-18 SDOH — ECONOMIC STABILITY: HOUSING INSECURITY
IN THE LAST 12 MONTHS, WAS THERE A TIME WHEN YOU DID NOT HAVE A STEADY PLACE TO SLEEP OR SLEPT IN A SHELTER (INCLUDING NOW)?: NO

## 2023-07-18 SDOH — ECONOMIC STABILITY: FOOD INSECURITY: WITHIN THE PAST 12 MONTHS, THE FOOD YOU BOUGHT JUST DIDN'T LAST AND YOU DIDN'T HAVE MONEY TO GET MORE.: NEVER TRUE

## 2023-07-18 ASSESSMENT — PATIENT HEALTH QUESTIONNAIRE - PHQ9
2. FEELING DOWN, DEPRESSED OR HOPELESS: 0
SUM OF ALL RESPONSES TO PHQ QUESTIONS 1-9: 0
SUM OF ALL RESPONSES TO PHQ QUESTIONS 1-9: 0
4. FEELING TIRED OR HAVING LITTLE ENERGY: 0
SUM OF ALL RESPONSES TO PHQ QUESTIONS 1-9: 0
8. MOVING OR SPEAKING SO SLOWLY THAT OTHER PEOPLE COULD HAVE NOTICED. OR THE OPPOSITE, BEING SO FIGETY OR RESTLESS THAT YOU HAVE BEEN MOVING AROUND A LOT MORE THAN USUAL: 0
6. FEELING BAD ABOUT YOURSELF - OR THAT YOU ARE A FAILURE OR HAVE LET YOURSELF OR YOUR FAMILY DOWN: 0
7. TROUBLE CONCENTRATING ON THINGS, SUCH AS READING THE NEWSPAPER OR WATCHING TELEVISION: 0
5. POOR APPETITE OR OVEREATING: 0
3. TROUBLE FALLING OR STAYING ASLEEP: 0
1. LITTLE INTEREST OR PLEASURE IN DOING THINGS: 0
9. THOUGHTS THAT YOU WOULD BE BETTER OFF DEAD, OR OF HURTING YOURSELF: 0
SUM OF ALL RESPONSES TO PHQ9 QUESTIONS 1 & 2: 0
10. IF YOU CHECKED OFF ANY PROBLEMS, HOW DIFFICULT HAVE THESE PROBLEMS MADE IT FOR YOU TO DO YOUR WORK, TAKE CARE OF THINGS AT HOME, OR GET ALONG WITH OTHER PEOPLE: 0
SUM OF ALL RESPONSES TO PHQ QUESTIONS 1-9: 0

## 2023-07-18 NOTE — PROGRESS NOTES
[facility]  [facility] [address] [address] [address]  [address] [place] [address]  Dept: [phone]  Dept Fax: [phone]  Loc: [phone]    Lesley Cortés is a 33 y.o. female who presents today for her medical conditions/complaints as noted below. Lesley Cortés is c/o of Anxiety (Wants to talk about meds for anxiety and depression)        HPI:     HPI   Chief Complaint   Patient presents with    Anxiety     Wants to talk about meds for anxiety and depression   She was on Prozac 40 mg a day and Abilify but stopped those several months ago because she felt she was better. She took Ativan as needed for anxiety. She has been diet and exercising and lost about 25 pounds on her own through calorie counting. She is interested in a weight loss medication that several of her friends are taking semaglutide. She thinks now that she probably needs to be back on her Prozac and may need some Ativan on occasion. She said that she seems overwhelmed at times she has 3 small kids and her and her  were at both work full-time.   Past Medical History:   Diagnosis Date    Chiari malformation     Depression     History of chicken pox     Neurologic disorder     has not had one since 5th grade    Recurrent acute otitis media     as a child, required tubes, then L TM reconstruction    Seizures ([address])     absent, as a child       Past Surgical History:   Procedure Laterality Date    CHOLECYSTECTOMY      DILATION AND CURETTAGE  05/02/13    INNER EAR SURGERY  2009    L ear TM reconstruction    TYMPANOSTOMY TUBE PLACEMENT         Vitals 7/18/2023 7/28/2021 7/28/2021 6/29/2021 5/29/2021 2/80/0835   SYSTOLIC 493 [phone]   DIASTOLIC 74 76 79 78 86 80   Site - Left Upper Arm Right Upper Arm - - Left Upper Arm   Position - - - - - -   Cuff Size - - - - - -   Pulse 79 - 78 117 101 102   Temp 98.3 - 97.7 97.7 97 97.3   Resp 16 - 18 - 18 -   SpO2 97 - 98 97 96 98   Weight 236 lb 12.8 oz - 274 lb 268 lb

## 2023-07-18 NOTE — PATIENT INSTRUCTIONS
The is a weight loss medication that is a once a week injection. You start at the 0.25 subcu injection weekly for a month and then you can go up to the 0.5 mg the second month. You will need to call for prescription for this. It does continue to go up in dose as long as you are tolerating the side effects  You may use Zofran for nausea  You must eat small meals and do not overeat  You can start something for acid reflux if you are experiencing it and not on anything  Make sure you are not getting constipated you may use magnesium 250 mg daily over-the-counter or stool softeners and if no bowel movement in 2 days MiraLAX. Start the medication, prozac. You must take this medication daily. It will take about 10 days for you to notice a difference. If depression worsens or no emotion stop the medication and call me. The most common side effect is some nausea but this should subside in a few days. Refer to [facility]  Trazodone for sleep and ativan if needed. Natural ways to deal with stress is counseling, yoga, mediation ,exercise , and cleaning up diet to avoid process foods.

## 2023-07-19 ENCOUNTER — TELEPHONE (OUTPATIENT)
Dept: PSYCHIATRY | Age: 33
End: 2023-07-19

## 2023-07-19 NOTE — TELEPHONE ENCOUNTER
Called pt to schedule an appt from a referral    No answer and LVM.     Electronically signed by Joe Cruz MA on 7/19/2023 at 3:29 PM

## 2023-07-24 ENCOUNTER — TELEPHONE (OUTPATIENT)
Dept: PSYCHIATRY | Age: 33
End: 2023-07-24

## 2023-07-24 NOTE — TELEPHONE ENCOUNTER
Called pt to schedule an appt from a referral    No answer and LVM.     Electronically signed by Ky Kenney MA on 7/24/2023 at 10:45 AM

## 2023-07-24 NOTE — TELEPHONE ENCOUNTER
Pt called back to schedule an appt from a referral     Scheduled with Rashaad Kilpatrick for 08/03/23 @ 3.     Electronically signed by Saúl Christiansen MA on 7/24/2023 at 11:45 AM

## 2023-07-26 ENCOUNTER — OFFICE VISIT (OUTPATIENT)
Dept: PRIMARY CARE CLINIC | Age: 33
End: 2023-07-26

## 2023-07-26 VITALS
HEIGHT: 69 IN | RESPIRATION RATE: 16 BRPM | BODY MASS INDEX: 34.97 KG/M2 | HEART RATE: 94 BPM | OXYGEN SATURATION: 97 % | TEMPERATURE: 98.5 F | SYSTOLIC BLOOD PRESSURE: 124 MMHG | DIASTOLIC BLOOD PRESSURE: 84 MMHG

## 2023-07-26 DIAGNOSIS — L02.412 ABSCESS OF LEFT AXILLA: Primary | ICD-10-CM

## 2023-07-26 RX ORDER — OXYCODONE HYDROCHLORIDE AND ACETAMINOPHEN 5; 325 MG/1; MG/1
1 TABLET ORAL EVERY 6 HOURS PRN
Qty: 12 TABLET | Refills: 0 | Status: SHIPPED | OUTPATIENT
Start: 2023-07-26 | End: 2023-07-29

## 2023-07-26 RX ORDER — KETOROLAC TROMETHAMINE 30 MG/ML
60 INJECTION, SOLUTION INTRAMUSCULAR; INTRAVENOUS ONCE
Status: COMPLETED | OUTPATIENT
Start: 2023-07-26 | End: 2023-07-26

## 2023-07-26 RX ORDER — CLINDAMYCIN HYDROCHLORIDE 300 MG/1
300 CAPSULE ORAL 3 TIMES DAILY
Qty: 30 CAPSULE | Refills: 0 | Status: SHIPPED | OUTPATIENT
Start: 2023-07-26 | End: 2023-08-05

## 2023-07-26 RX ADMIN — KETOROLAC TROMETHAMINE 60 MG: 30 INJECTION, SOLUTION INTRAMUSCULAR; INTRAVENOUS at 14:57

## 2023-07-26 NOTE — PATIENT INSTRUCTIONS
Warm moist compresses  Start the clindamycin tonight  If redness worsening go to the emergency room  If still quite red tomorrow we can do a second shot of Rocephin here in the office  Pull the packing out tomorrow I would like you to try to leave it in 12 hours if you can tolerate it. Pain pills as needed every 4 hours. If pain is really bad you can do ice to your arm.

## 2023-07-27 ENCOUNTER — HOSPITAL ENCOUNTER (OUTPATIENT)
Facility: HOSPITAL | Age: 33
Setting detail: OBSERVATION
Discharge: HOME | End: 2023-07-29
Attending: STUDENT IN AN ORGANIZED HEALTH CARE EDUCATION/TRAINING PROGRAM | Admitting: FAMILY MEDICINE
Payer: COMMERCIAL

## 2023-07-27 ENCOUNTER — NURSE ONLY (OUTPATIENT)
Dept: PRIMARY CARE CLINIC | Age: 33
End: 2023-07-27
Payer: COMMERCIAL

## 2023-07-27 DIAGNOSIS — L02.91 ABSCESS: Primary | ICD-10-CM

## 2023-07-27 DIAGNOSIS — R50.9 FEVER, UNSPECIFIED FEVER CAUSE: ICD-10-CM

## 2023-07-27 DIAGNOSIS — L02.412 ABSCESS OF LEFT AXILLA: Primary | ICD-10-CM

## 2023-07-27 DIAGNOSIS — L03.90 CELLULITIS, UNSPECIFIED CELLULITIS SITE: ICD-10-CM

## 2023-07-27 LAB
ALBUMIN SERPL-MCNC: 3.9 G/DL (ref 3.5–5.2)
ALBUMIN/GLOB SERPL: 1.6 G/DL
ALP SERPL-CCNC: 61 U/L (ref 39–117)
ALT SERPL W P-5'-P-CCNC: 29 U/L (ref 1–33)
ANION GAP SERPL CALCULATED.3IONS-SCNC: 12 MMOL/L (ref 5–15)
AST SERPL-CCNC: 24 U/L (ref 1–32)
BASOPHILS # BLD AUTO: 0.02 10*3/MM3 (ref 0–0.2)
BASOPHILS NFR BLD AUTO: 0.2 % (ref 0–1.5)
BILIRUB SERPL-MCNC: 0.5 MG/DL (ref 0–1.2)
BUN SERPL-MCNC: 8 MG/DL (ref 6–20)
BUN/CREAT SERPL: 11.1 (ref 7–25)
CALCIUM SPEC-SCNC: 8.8 MG/DL (ref 8.6–10.5)
CHLORIDE SERPL-SCNC: 105 MMOL/L (ref 98–107)
CO2 SERPL-SCNC: 20 MMOL/L (ref 22–29)
CREAT SERPL-MCNC: 0.72 MG/DL (ref 0.57–1)
D-LACTATE SERPL-SCNC: 0.7 MMOL/L (ref 0.5–2)
DEPRECATED RDW RBC AUTO: 45.2 FL (ref 37–54)
EGFRCR SERPLBLD CKD-EPI 2021: 114.8 ML/MIN/1.73
EOSINOPHIL # BLD AUTO: 0.14 10*3/MM3 (ref 0–0.4)
EOSINOPHIL NFR BLD AUTO: 1.4 % (ref 0.3–6.2)
ERYTHROCYTE [DISTWIDTH] IN BLOOD BY AUTOMATED COUNT: 13.9 % (ref 12.3–15.4)
ERYTHROCYTE [SEDIMENTATION RATE] IN BLOOD: 26 MM/HR (ref 0–20)
GLOBULIN UR ELPH-MCNC: 2.5 GM/DL
GLUCOSE SERPL-MCNC: 103 MG/DL (ref 65–99)
HCG SERPL QL: NEGATIVE
HCT VFR BLD AUTO: 32.9 % (ref 34–46.6)
HGB BLD-MCNC: 10.4 G/DL (ref 12–15.9)
HOLD SPECIMEN: NORMAL
IMM GRANULOCYTES # BLD AUTO: 0.05 10*3/MM3 (ref 0–0.05)
IMM GRANULOCYTES NFR BLD AUTO: 0.5 % (ref 0–0.5)
LYMPHOCYTES # BLD AUTO: 1.3 10*3/MM3 (ref 0.7–3.1)
LYMPHOCYTES NFR BLD AUTO: 13.1 % (ref 19.6–45.3)
MCH RBC QN AUTO: 28.1 PG (ref 26.6–33)
MCHC RBC AUTO-ENTMCNC: 31.6 G/DL (ref 31.5–35.7)
MCV RBC AUTO: 88.9 FL (ref 79–97)
MONOCYTES # BLD AUTO: 0.73 10*3/MM3 (ref 0.1–0.9)
MONOCYTES NFR BLD AUTO: 7.4 % (ref 5–12)
NEUTROPHILS NFR BLD AUTO: 7.67 10*3/MM3 (ref 1.7–7)
NEUTROPHILS NFR BLD AUTO: 77.4 % (ref 42.7–76)
NRBC BLD AUTO-RTO: 0 /100 WBC (ref 0–0.2)
PLATELET # BLD AUTO: 220 10*3/MM3 (ref 140–450)
PMV BLD AUTO: 11.6 FL (ref 6–12)
POTASSIUM SERPL-SCNC: 4.3 MMOL/L (ref 3.5–5.2)
PROCALCITONIN SERPL-MCNC: 0.05 NG/ML (ref 0–0.25)
PROT SERPL-MCNC: 6.4 G/DL (ref 6–8.5)
RBC # BLD AUTO: 3.7 10*6/MM3 (ref 3.77–5.28)
SODIUM SERPL-SCNC: 137 MMOL/L (ref 136–145)
WBC NRBC COR # BLD: 9.91 10*3/MM3 (ref 3.4–10.8)
WHOLE BLOOD HOLD COAG: NORMAL
WHOLE BLOOD HOLD SPECIMEN: NORMAL

## 2023-07-27 PROCEDURE — 99284 EMERGENCY DEPT VISIT MOD MDM: CPT

## 2023-07-27 PROCEDURE — 84145 PROCALCITONIN (PCT): CPT | Performed by: STUDENT IN AN ORGANIZED HEALTH CARE EDUCATION/TRAINING PROGRAM

## 2023-07-27 PROCEDURE — 96365 THER/PROPH/DIAG IV INF INIT: CPT

## 2023-07-27 PROCEDURE — 96376 TX/PRO/DX INJ SAME DRUG ADON: CPT

## 2023-07-27 PROCEDURE — 25010000002 ONDANSETRON PER 1 MG: Performed by: STUDENT IN AN ORGANIZED HEALTH CARE EDUCATION/TRAINING PROGRAM

## 2023-07-27 PROCEDURE — 84703 CHORIONIC GONADOTROPIN ASSAY: CPT | Performed by: STUDENT IN AN ORGANIZED HEALTH CARE EDUCATION/TRAINING PROGRAM

## 2023-07-27 PROCEDURE — 83605 ASSAY OF LACTIC ACID: CPT | Performed by: STUDENT IN AN ORGANIZED HEALTH CARE EDUCATION/TRAINING PROGRAM

## 2023-07-27 PROCEDURE — 36415 COLL VENOUS BLD VENIPUNCTURE: CPT

## 2023-07-27 PROCEDURE — 25010000002 HYDROMORPHONE PER 4 MG: Performed by: STUDENT IN AN ORGANIZED HEALTH CARE EDUCATION/TRAINING PROGRAM

## 2023-07-27 PROCEDURE — 96372 THER/PROPH/DIAG INJ SC/IM: CPT | Performed by: NURSE PRACTITIONER

## 2023-07-27 PROCEDURE — 80053 COMPREHEN METABOLIC PANEL: CPT | Performed by: STUDENT IN AN ORGANIZED HEALTH CARE EDUCATION/TRAINING PROGRAM

## 2023-07-27 PROCEDURE — 96375 TX/PRO/DX INJ NEW DRUG ADDON: CPT

## 2023-07-27 PROCEDURE — 25010000002 CLINDAMYCIN PER 300 MG

## 2023-07-27 PROCEDURE — 85652 RBC SED RATE AUTOMATED: CPT | Performed by: STUDENT IN AN ORGANIZED HEALTH CARE EDUCATION/TRAINING PROGRAM

## 2023-07-27 PROCEDURE — 87040 BLOOD CULTURE FOR BACTERIA: CPT | Performed by: STUDENT IN AN ORGANIZED HEALTH CARE EDUCATION/TRAINING PROGRAM

## 2023-07-27 PROCEDURE — 85025 COMPLETE CBC W/AUTO DIFF WBC: CPT | Performed by: STUDENT IN AN ORGANIZED HEALTH CARE EDUCATION/TRAINING PROGRAM

## 2023-07-27 RX ORDER — TRAZODONE HYDROCHLORIDE 50 MG/1
50 TABLET ORAL NIGHTLY
COMMUNITY

## 2023-07-27 RX ORDER — HYDROMORPHONE HYDROCHLORIDE 1 MG/ML
0.5 INJECTION, SOLUTION INTRAMUSCULAR; INTRAVENOUS; SUBCUTANEOUS ONCE
Status: COMPLETED | OUTPATIENT
Start: 2023-07-27 | End: 2023-07-27

## 2023-07-27 RX ORDER — CLINDAMYCIN PHOSPHATE 600 MG/50ML
600 INJECTION, SOLUTION INTRAVENOUS ONCE
Status: COMPLETED | OUTPATIENT
Start: 2023-07-27 | End: 2023-07-28

## 2023-07-27 RX ORDER — LORAZEPAM 1 MG/1
1 TABLET ORAL 2 TIMES DAILY
COMMUNITY

## 2023-07-27 RX ORDER — CLINDAMYCIN HYDROCHLORIDE 300 MG/1
300 CAPSULE ORAL 3 TIMES DAILY
COMMUNITY

## 2023-07-27 RX ORDER — ACETAMINOPHEN 500 MG
1000 TABLET ORAL ONCE
Status: COMPLETED | OUTPATIENT
Start: 2023-07-27 | End: 2023-07-27

## 2023-07-27 RX ORDER — ONDANSETRON 2 MG/ML
4 INJECTION INTRAMUSCULAR; INTRAVENOUS ONCE
Status: COMPLETED | OUTPATIENT
Start: 2023-07-27 | End: 2023-07-27

## 2023-07-27 RX ORDER — SODIUM CHLORIDE 0.9 % (FLUSH) 0.9 %
10 SYRINGE (ML) INJECTION AS NEEDED
Status: DISCONTINUED | OUTPATIENT
Start: 2023-07-27 | End: 2023-07-29 | Stop reason: HOSPADM

## 2023-07-27 RX ADMIN — ACETAMINOPHEN 1000 MG: 500 TABLET, FILM COATED ORAL at 22:26

## 2023-07-27 RX ADMIN — Medication 10 ML: at 23:33

## 2023-07-27 RX ADMIN — HYDROMORPHONE HYDROCHLORIDE 0.5 MG: 1 INJECTION, SOLUTION INTRAMUSCULAR; INTRAVENOUS; SUBCUTANEOUS at 23:32

## 2023-07-27 RX ADMIN — HYDROMORPHONE HYDROCHLORIDE 0.5 MG: 1 INJECTION, SOLUTION INTRAMUSCULAR; INTRAVENOUS; SUBCUTANEOUS at 22:27

## 2023-07-27 RX ADMIN — Medication 10 ML: at 22:28

## 2023-07-27 RX ADMIN — SODIUM CHLORIDE 600 MG: 9 INJECTION, SOLUTION INTRAVENOUS at 23:08

## 2023-07-27 RX ADMIN — ONDANSETRON 4 MG: 2 INJECTION INTRAMUSCULAR; INTRAVENOUS at 22:27

## 2023-07-28 ENCOUNTER — APPOINTMENT (OUTPATIENT)
Dept: ULTRASOUND IMAGING | Facility: HOSPITAL | Age: 33
End: 2023-07-28
Payer: COMMERCIAL

## 2023-07-28 PROBLEM — E66.813 CLASS 3 SEVERE OBESITY WITH BODY MASS INDEX (BMI) OF 40.0 TO 44.9 IN ADULT: Status: ACTIVE | Noted: 2023-07-28

## 2023-07-28 PROBLEM — L03.90 CELLULITIS: Status: ACTIVE | Noted: 2023-07-28

## 2023-07-28 PROBLEM — L02.419 AXILLARY ABSCESS: Status: ACTIVE | Noted: 2023-07-28

## 2023-07-28 PROBLEM — E66.01 CLASS 3 SEVERE OBESITY WITH BODY MASS INDEX (BMI) OF 40.0 TO 44.9 IN ADULT: Status: ACTIVE | Noted: 2023-07-28

## 2023-07-28 PROBLEM — F32.A ANXIETY AND DEPRESSION: Status: ACTIVE | Noted: 2023-07-28

## 2023-07-28 PROBLEM — E66.9 OBESITY: Status: ACTIVE | Noted: 2023-07-28

## 2023-07-28 PROBLEM — F41.9 ANXIETY AND DEPRESSION: Status: ACTIVE | Noted: 2023-07-28

## 2023-07-28 LAB
ANION GAP SERPL CALCULATED.3IONS-SCNC: 7 MMOL/L (ref 5–15)
BUN SERPL-MCNC: 6 MG/DL (ref 6–20)
BUN/CREAT SERPL: 8.6 (ref 7–25)
CALCIUM SPEC-SCNC: 8.5 MG/DL (ref 8.6–10.5)
CHLORIDE SERPL-SCNC: 106 MMOL/L (ref 98–107)
CO2 SERPL-SCNC: 25 MMOL/L (ref 22–29)
CREAT SERPL-MCNC: 0.7 MG/DL (ref 0.57–1)
DEPRECATED RDW RBC AUTO: 47.5 FL (ref 37–54)
EGFRCR SERPLBLD CKD-EPI 2021: 118.7 ML/MIN/1.73
ERYTHROCYTE [DISTWIDTH] IN BLOOD BY AUTOMATED COUNT: 14.2 % (ref 12.3–15.4)
GLUCOSE SERPL-MCNC: 92 MG/DL (ref 65–99)
HCT VFR BLD AUTO: 30.6 % (ref 34–46.6)
HGB BLD-MCNC: 9.4 G/DL (ref 12–15.9)
HOLD SPECIMEN: NORMAL
MCH RBC QN AUTO: 27.9 PG (ref 26.6–33)
MCHC RBC AUTO-ENTMCNC: 30.7 G/DL (ref 31.5–35.7)
MCV RBC AUTO: 90.8 FL (ref 79–97)
PLATELET # BLD AUTO: 191 10*3/MM3 (ref 140–450)
PMV BLD AUTO: 11.5 FL (ref 6–12)
POTASSIUM SERPL-SCNC: 4 MMOL/L (ref 3.5–5.2)
RBC # BLD AUTO: 3.37 10*6/MM3 (ref 3.77–5.28)
SODIUM SERPL-SCNC: 138 MMOL/L (ref 136–145)
WBC NRBC COR # BLD: 7.57 10*3/MM3 (ref 3.4–10.8)

## 2023-07-28 PROCEDURE — 25010000002 VANCOMYCIN PER 500 MG: Performed by: FAMILY MEDICINE

## 2023-07-28 PROCEDURE — 25010000002 HYDROMORPHONE PER 4 MG: Performed by: NURSE PRACTITIONER

## 2023-07-28 PROCEDURE — G0378 HOSPITAL OBSERVATION PER HR: HCPCS

## 2023-07-28 PROCEDURE — 85027 COMPLETE CBC AUTOMATED: CPT | Performed by: NURSE PRACTITIONER

## 2023-07-28 PROCEDURE — 25010000002 VANCOMYCIN 1 G RECONSTITUTED SOLUTION 1 EACH VIAL: Performed by: FAMILY MEDICINE

## 2023-07-28 PROCEDURE — 25010000002 CEFTRIAXONE PER 250 MG: Performed by: NURSE PRACTITIONER

## 2023-07-28 PROCEDURE — 96367 TX/PROPH/DG ADDL SEQ IV INF: CPT

## 2023-07-28 PROCEDURE — 80048 BASIC METABOLIC PNL TOTAL CA: CPT | Performed by: NURSE PRACTITIONER

## 2023-07-28 PROCEDURE — 76882 US LMTD JT/FCL EVL NVASC XTR: CPT

## 2023-07-28 PROCEDURE — 96376 TX/PRO/DX INJ SAME DRUG ADON: CPT

## 2023-07-28 PROCEDURE — 25010000002 VANCOMYCIN 10 G RECONSTITUTED SOLUTION: Performed by: FAMILY MEDICINE

## 2023-07-28 RX ORDER — TRAZODONE HYDROCHLORIDE 50 MG/1
50 TABLET ORAL NIGHTLY
Status: DISCONTINUED | OUTPATIENT
Start: 2023-07-28 | End: 2023-07-29 | Stop reason: HOSPADM

## 2023-07-28 RX ORDER — SODIUM CHLORIDE 0.9 % (FLUSH) 0.9 %
10 SYRINGE (ML) INJECTION EVERY 12 HOURS SCHEDULED
Status: DISCONTINUED | OUTPATIENT
Start: 2023-07-28 | End: 2023-07-29 | Stop reason: HOSPADM

## 2023-07-28 RX ORDER — ONDANSETRON 4 MG/1
4 TABLET, FILM COATED ORAL EVERY 8 HOURS PRN
COMMUNITY

## 2023-07-28 RX ORDER — BISACODYL 5 MG/1
5 TABLET, DELAYED RELEASE ORAL DAILY PRN
Status: DISCONTINUED | OUTPATIENT
Start: 2023-07-28 | End: 2023-07-29 | Stop reason: HOSPADM

## 2023-07-28 RX ORDER — SODIUM CHLORIDE 9 MG/ML
40 INJECTION, SOLUTION INTRAVENOUS AS NEEDED
Status: DISCONTINUED | OUTPATIENT
Start: 2023-07-28 | End: 2023-07-29 | Stop reason: HOSPADM

## 2023-07-28 RX ORDER — ACETAMINOPHEN 325 MG/1
650 TABLET ORAL EVERY 4 HOURS PRN
Status: DISCONTINUED | OUTPATIENT
Start: 2023-07-28 | End: 2023-07-29 | Stop reason: HOSPADM

## 2023-07-28 RX ORDER — VANCOMYCIN/0.9 % SOD CHLORIDE 1.5G/250ML
1500 PLASTIC BAG, INJECTION (ML) INTRAVENOUS EVERY 12 HOURS
Status: DISCONTINUED | OUTPATIENT
Start: 2023-07-28 | End: 2023-07-29 | Stop reason: HOSPADM

## 2023-07-28 RX ORDER — HYDROMORPHONE HYDROCHLORIDE 1 MG/ML
0.5 INJECTION, SOLUTION INTRAMUSCULAR; INTRAVENOUS; SUBCUTANEOUS EVERY 4 HOURS PRN
Status: DISCONTINUED | OUTPATIENT
Start: 2023-07-28 | End: 2023-07-29

## 2023-07-28 RX ORDER — POLYETHYLENE GLYCOL 3350 17 G/17G
17 POWDER, FOR SOLUTION ORAL DAILY PRN
Status: DISCONTINUED | OUTPATIENT
Start: 2023-07-28 | End: 2023-07-29 | Stop reason: HOSPADM

## 2023-07-28 RX ORDER — SODIUM CHLORIDE 0.9 % (FLUSH) 0.9 %
10 SYRINGE (ML) INJECTION AS NEEDED
Status: DISCONTINUED | OUTPATIENT
Start: 2023-07-28 | End: 2023-07-29 | Stop reason: HOSPADM

## 2023-07-28 RX ORDER — OXYCODONE AND ACETAMINOPHEN 7.5; 325 MG/1; MG/1
1 TABLET ORAL EVERY 6 HOURS PRN
Status: DISCONTINUED | OUTPATIENT
Start: 2023-07-28 | End: 2023-07-29 | Stop reason: HOSPADM

## 2023-07-28 RX ORDER — BISACODYL 10 MG
10 SUPPOSITORY, RECTAL RECTAL DAILY PRN
Status: DISCONTINUED | OUTPATIENT
Start: 2023-07-28 | End: 2023-07-29 | Stop reason: HOSPADM

## 2023-07-28 RX ORDER — SODIUM CHLORIDE 9 MG/ML
50 INJECTION, SOLUTION INTRAVENOUS CONTINUOUS
Status: DISCONTINUED | OUTPATIENT
Start: 2023-07-28 | End: 2023-07-29 | Stop reason: HOSPADM

## 2023-07-28 RX ORDER — FLUOXETINE HYDROCHLORIDE 20 MG/1
20 CAPSULE ORAL DAILY
Status: DISCONTINUED | OUTPATIENT
Start: 2023-07-28 | End: 2023-07-29 | Stop reason: HOSPADM

## 2023-07-28 RX ORDER — ONDANSETRON 4 MG/1
4 TABLET, FILM COATED ORAL EVERY 8 HOURS PRN
Status: DISCONTINUED | OUTPATIENT
Start: 2023-07-28 | End: 2023-07-29 | Stop reason: HOSPADM

## 2023-07-28 RX ORDER — LORAZEPAM 1 MG/1
1 TABLET ORAL 2 TIMES DAILY PRN
Status: DISCONTINUED | OUTPATIENT
Start: 2023-07-28 | End: 2023-07-29 | Stop reason: HOSPADM

## 2023-07-28 RX ORDER — AMOXICILLIN 250 MG
2 CAPSULE ORAL 2 TIMES DAILY
Status: DISCONTINUED | OUTPATIENT
Start: 2023-07-28 | End: 2023-07-29 | Stop reason: HOSPADM

## 2023-07-28 RX ORDER — NALOXONE HCL 0.4 MG/ML
0.4 VIAL (ML) INJECTION
Status: DISCONTINUED | OUTPATIENT
Start: 2023-07-28 | End: 2023-07-29

## 2023-07-28 RX ADMIN — HYDROMORPHONE HYDROCHLORIDE 0.5 MG: 1 INJECTION, SOLUTION INTRAMUSCULAR; INTRAVENOUS; SUBCUTANEOUS at 06:13

## 2023-07-28 RX ADMIN — CEFTRIAXONE SODIUM 2000 MG: 2 INJECTION, POWDER, FOR SOLUTION INTRAMUSCULAR; INTRAVENOUS at 21:08

## 2023-07-28 RX ADMIN — VANCOMYCIN HYDROCHLORIDE 2500 MG: 500 INJECTION, POWDER, LYOPHILIZED, FOR SOLUTION INTRAVENOUS at 02:05

## 2023-07-28 RX ADMIN — VANCOMYCIN HYDROCHLORIDE 1500 MG: 10 INJECTION, POWDER, LYOPHILIZED, FOR SOLUTION INTRAVENOUS at 14:37

## 2023-07-28 RX ADMIN — HYDROMORPHONE HYDROCHLORIDE 0.5 MG: 1 INJECTION, SOLUTION INTRAMUSCULAR; INTRAVENOUS; SUBCUTANEOUS at 18:09

## 2023-07-28 RX ADMIN — FLUOXETINE HYDROCHLORIDE 20 MG: 20 CAPSULE ORAL at 10:03

## 2023-07-28 RX ADMIN — TRAZODONE HYDROCHLORIDE 50 MG: 50 TABLET ORAL at 02:04

## 2023-07-28 RX ADMIN — HYDROMORPHONE HYDROCHLORIDE 0.5 MG: 1 INJECTION, SOLUTION INTRAMUSCULAR; INTRAVENOUS; SUBCUTANEOUS at 14:03

## 2023-07-28 RX ADMIN — CEFTRIAXONE SODIUM 2000 MG: 2 INJECTION, POWDER, FOR SOLUTION INTRAMUSCULAR; INTRAVENOUS at 01:26

## 2023-07-28 RX ADMIN — Medication 10 ML: at 10:04

## 2023-07-28 RX ADMIN — Medication 10 ML: at 01:28

## 2023-07-28 RX ADMIN — DOCUSATE SODIUM 50 MG AND SENNOSIDES 8.6 MG 2 TABLET: 8.6; 5 TABLET, FILM COATED ORAL at 21:07

## 2023-07-28 RX ADMIN — TRAZODONE HYDROCHLORIDE 50 MG: 50 TABLET ORAL at 23:37

## 2023-07-28 RX ADMIN — HYDROMORPHONE HYDROCHLORIDE 0.5 MG: 1 INJECTION, SOLUTION INTRAMUSCULAR; INTRAVENOUS; SUBCUTANEOUS at 10:07

## 2023-07-28 RX ADMIN — Medication 10 ML: at 21:08

## 2023-07-28 RX ADMIN — HYDROMORPHONE HYDROCHLORIDE 0.5 MG: 1 INJECTION, SOLUTION INTRAMUSCULAR; INTRAVENOUS; SUBCUTANEOUS at 01:27

## 2023-07-28 RX ADMIN — DOCUSATE SODIUM 50 MG AND SENNOSIDES 8.6 MG 2 TABLET: 8.6; 5 TABLET, FILM COATED ORAL at 10:03

## 2023-07-28 RX ADMIN — SODIUM CHLORIDE 50 ML/HR: 9 INJECTION, SOLUTION INTRAVENOUS at 01:28

## 2023-07-28 RX ADMIN — HYDROMORPHONE HYDROCHLORIDE 0.5 MG: 1 INJECTION, SOLUTION INTRAMUSCULAR; INTRAVENOUS; SUBCUTANEOUS at 23:33

## 2023-07-28 ASSESSMENT — ENCOUNTER SYMPTOMS: COLOR CHANGE: 1

## 2023-07-28 NOTE — ED PROVIDER NOTES
Subjective   History of Present Illness  Patient is a 32-year-old female that presents to the ER after failed oral antibiotic treatment of 2 abscesses located under the left axillary area.  Patient was seen by primary care provider on  and  patient was provided 2 injections of IM Rocephin and oral clindamycin.  Wounds were I and D at PCPs office patient stated she tolerated well, culture and sensitivity was obtained while at PCPs office patient sees a [facility] primary care provider.  Patient has 2 incision sites on posterior area of left upper arm.  Mishaps increased, patient has had fever and general malaise.       Review of Systems   Constitutional:  Positive for chills, fatigue and fever.   Skin:  Positive for wound.        2 wounds posterior upper underarm, drainage and erythema around site     Past Medical History:   Diagnosis Date    Anxiety     Depression        No Known Allergies    Past Surgical History:   Procedure Laterality Date    CHOLECYSTECTOMY WITH INTRAOPERATIVE CHOLANGIOGRAM N/A 2019    Procedure: CHOLECYSTECTOMY LAPAROSCOPIC INTRAOPERATIVE CHOLANGIOGRAM;  Surgeon: Cabrera Enriquez MD;  Location: [facility];  Service: General    DILATATION AND CURETTAGE      INNER EAR SURGERY         History reviewed. No pertinent family history.    Social History     Socioeconomic History    Marital status:    Tobacco Use    Smoking status: Former     Types: Cigarettes     Quit date: 2016     Years since quittin.4    Smokeless tobacco: Never   Substance and Sexual Activity    Alcohol use: No    Drug use: No    Sexual activity: Defer           Objective   Physical Exam  Vitals and nursing note reviewed.   Constitutional:       General: She is not in acute distress.     Appearance: Normal appearance. She is ill-appearing. She is not toxic-appearing or diaphoretic.   HENT:      Head: Normocephalic and atraumatic.      Right Ear: External ear normal.      Left Ear: External ear normal.       Nose: Nose normal.      Mouth/Throat:      Mouth: Mucous membranes are moist.   Eyes:      General:         Right eye: No discharge.         Left eye: No discharge.      Extraocular Movements: Extraocular movements intact.      Conjunctiva/sclera: Conjunctivae normal.   Cardiovascular:      Rate and Rhythm: Normal rate.   Pulmonary:      Effort: Pulmonary effort is normal. No respiratory distress.      Breath sounds: No rhonchi.   Abdominal:      General: Abdomen is flat.      Tenderness: There is no abdominal tenderness. There is no guarding or rebound.   Musculoskeletal:         General: No deformity or signs of injury.      Cervical back: Normal range of motion.   Skin:     General: Skin is warm.      Coloration: Skin is not jaundiced.      Findings: Erythema and lesion present.      Comments: 2 wounds posterior upper left arm, purulent drainage from both wounds erythema around surrounding site, area is hot to touch.  See photos on chart.  Wounds are open wound bed is pink.  Approximately 1 cm in length to both wounds   Neurological:      Mental Status: She is alert and oriented to person, place, and time. Mental status is at baseline.   Psychiatric:         Mood and Affect: Mood normal.         Behavior: Behavior normal.         Thought Content: Thought content normal.         Judgment: Judgment normal.       Procedures           ED Course  ED Course as of 07/28/23 0038   Thu Jul 27, 2023   2334 Sed Rate(!): 26  Spoke with patient and spouse about possibility of consulting general surgeon due to failed outpatient antibiotic therapy.  They state they want to discuss with them understaffed before they make a decision. [HS]      ED Course User Index  [HS] Trupti Torres APRN                                           Medical Decision Making  Mey Valle is a 32 y.o. female who presents to the ED for after failed oral antibiotic treatment of 2 abscesses located under the left axillary area.  Patient was  seen by primary care provider on 7/26 and 7/27 patient was provided 2 injections of IM Rocephin and oral clindamycin.  Wounds were I and D at PCPs office patient stated she tolerated well, culture and sensitivity was obtained while at PCPs office patient sees a [facility] primary care provider.  Patient has 2 incision sites on posterior area of left upper arm.  Mishaps increased, patient has had fever and general malaise.   .     Patient was non-toxic appearing on arrival. No acute distress was noted. Past medical history, surgical history, and medication regimen reviewed.     Vital signs remained hemodynamically stable although patient was febrile.     External documents reviewed: Attempted to review [facility] records, wound culture and symptom sensitivity is not in records.     Patient's presentation raises suspicion for differentials including, but not limited to cellulitis, hydradenitis suppurativa, wound infection.     Given this, Mey was placed on the monitor and IV access was obtained as needed. Laboratory studies and imaging studies were ordered.     Please refer to ED course for labs and imaging results.  Labs Reviewed  COMPREHENSIVE METABOLIC PANEL - Abnormal; Notable for the following components:     Glucose                       103 (*)                CO2                           20.0 (*)            All other components within normal limits         Narrative: GFR Normal >60                  Chronic Kidney Disease <60                  Kidney Failure <15                    SEDIMENTATION RATE - Abnormal; Notable for the following components:     Sed Rate                      26 (*)              All other components within normal limits  CBC WITH AUTO DIFFERENTIAL - Abnormal; Notable for the following components:     RBC                           3.70 (*)               Hemoglobin                    10.4 (*)               Hematocrit                    32.9 (*)               Neutrophil %               "    77.4 (*)               Lymphocyte %                  13.1 (*)               Neutrophils, Absolute         7.67 (*)            All other components within normal limits  LACTIC ACID, PLASMA - Normal  HCG, SERUM, QUALITATIVE - Normal  PROCALCITONIN - Normal         Narrative: As a Marker for Sepsis (Non-Neonates):                                    1. <0.5 ng/mL represents a low risk of severe sepsis and/or septic shock.                  2. >2 ng/mL represents a high risk of severe sepsis and/or septic shock.                                    As a Marker for Lower Respiratory Tract Infections that require antibiotic therapy:                                    PCT on Admission    Antibiotic Therapy       6-12 Hrs later                                    >0.5                Strongly Recommended                  >0.25 - <0.5        Recommended                   0.1 - 0.25          Discouraged              Remeasure/reassess PCT                  <0.1                Strongly Discouraged     Remeasure/reassess PCT                                    As 28 day mortality risk marker: \"Change in Procalcitonin Result\" (>80% or <=80%) if Day 0 (or Day 1) and Day 4 values are available. Refer to http://www.[email or web address] [email or web address]-pct-calculator.com                                    Change in PCT <=80%                  A decrease of PCT levels below or equal to 80% defines a positive change in PCT test result representing a higher risk for 28-day all-cause mortality of patients diagnosed with severe sepsis for septic shock.                                    Change in PCT >80%                  A decrease of PCT levels of more than 80% defines a negative change in PCT result representing a lower risk for 28-day all-cause mortality of patients diagnosed with severe sepsis or septic shock.                     BLOOD CULTURE  BLOOD CULTURE  RAINBOW DRAW         Narrative: The following orders were created for panel order [place] Draw.              "     Procedure                               Abnormality         Status                                     ---------                               -----------         ------                                     Green Top (Gel)[[id number]]                                  Final result                               Lavender Top[[id number]]                                     Final result                               Red Top[[id number]]                                                                                     Sanchez Top[[id number]]                                         In process                                 Light Blue Top[[id number]]                                   Final result                                                 Please view results for these tests on the individual orders.  CBC AND DIFFERENTIAL         Narrative: The following orders were created for panel order CBC & Differential.                  Procedure                               Abnormality         Status                                     ---------                               -----------         ------                                     CBC Auto Differential[[id number]]        Abnormal            Final result                                                 Please view results for these tests on the individual orders.  GREEN TOP  LAVENDER TOP  LIGHT BLUE TOP  GRAY TOP       Medications  sodium chloride 0.9 % flush 10 mL (10 mL Intravenous Given 7/27/23 2333)  ondansetron (ZOFRAN) injection 4 mg (4 mg Intravenous Given 7/27/23 2227)  acetaminophen (TYLENOL) tablet 1,000 mg (1,000 mg Oral Given 7/27/23 2226)  HYDROmorphone (DILAUDID) injection 0.5 mg (0.5 mg Intravenous Given 7/27/23 2227)  clindamycin (CLEOCIN) 600 mg in sodium chloride 0.9% 50 mL IVPB (premix) (0 mg Intravenous Stopped 7/28/23 0010)  HYDROmorphone (DILAUDID) injection 0.5 mg (0.5 mg Intravenous Given 7/27/23 2332) On re-evaluation, patient remained hemodynamically  stable.       Given findings described above, patient's presentation is likely consistent with cellulitis abscess.   Discussion with consultants: Hospitalist for possible admission    Shared decision making: Spoke with patient and spouse about admission for IV antibiotics they are agreeable to the plan      Code status and discussions: Full code    I reassessed the patient and discussed the findings of the work up so far with everyone in the room. I said that the next step in treatment would be admission to the hospital for further workup and care. I also said that there is always some diagnostic uncertainty in the ER, that symptoms may change, and new things may be found after being admitted. The hospitalist service assumed primary care of the patient and the patient was admitted to their service in stable condition.      Problems Addressed:  Abscess: complicated acute illness or injury  Cellulitis, unspecified cellulitis site: complicated acute illness or injury  Fever, unspecified fever cause: complicated acute illness or injury    Amount and/or Complexity of Data Reviewed  Labs: ordered. Decision-making details documented in ED Course.    Risk  OTC drugs.  Prescription drug management.  Decision regarding hospitalization.        Final diagnoses:   Abscess   Fever, unspecified fever cause   Cellulitis, unspecified cellulitis site       ED Disposition  ED Disposition       ED Disposition   Decision to Admit    Condition   --    Comment   Level of Care: Telemetry [5]   Diagnosis: Cellulitis [402915]   Admitting Physician: ARMOND GLASS [[id number]]   Attending Physician: ARMOND GLASS [[id number]]   Certification: I Certify That Inpatient Hospital Services Are Medically Necessary For Greater Than 2 Midnights                 No follow-up provider specified.       Medication List      No changes were made to your prescriptions during this visit.            Trupti Torres, APRN  07/28/23 0038

## 2023-07-28 NOTE — ED NOTES
"Nursing report ED to floor  Mey Valle  32 y.o.  female    HPI:   Chief Complaint   Patient presents with    Abscess       Admitting doctor:   Lucius Mckeon MD    Consulting provider(s):  Consults       No orders found for last 30 day(s).             Admitting diagnosis:   The primary encounter diagnosis was Abscess. Diagnoses of Fever, unspecified fever cause and Cellulitis, unspecified cellulitis site were also pertinent to this visit.    Code status:   Current Code Status       Date Active Code Status Order ID Comments User Context       7/28/2023 0021 CPR (Attempt to Resuscitate) [id number]  Lynsey Mehta, [facility] ED        Question Answer    Code Status (Patient has no pulse and is not breathing) CPR (Attempt to Resuscitate)    Medical Interventions (Patient has pulse or is breathing) Full Support    Release to patient Routine Release                    Allergies:   Patient has no known allergies.    Intake and Output  No intake or output data in the 24 hours ending 07/28/23 0037    Weight:       07/27/23 2206   Weight: 128 kg (282 lb)       Most recent vitals:   Vitals:    07/27/23 2206 07/27/23 2333 07/28/23 0035   BP: 127/97 132/76 147/64   Pulse: 88 78 78   Resp: 18 20 20   Temp: (!) 100.7 °F (38.2 °C)  99.1 °F (37.3 °C)   TempSrc: Oral     SpO2: 100% 96% 95%   Weight: 128 kg (282 lb)     Height: 175.3 cm (69\")       Oxygen Therapy: .    Active LDAs/IV Access:   Lines, Drains & Airways       Active LDAs       Name Placement date Placement time Site Days    Peripheral IV 07/27/23 2219 Right Antecubital 07/27/23 2219  Antecubital  less than 1                    Labs (abnormal labs have a star):   Labs Reviewed   COMPREHENSIVE METABOLIC PANEL - Abnormal; Notable for the following components:       Result Value    Glucose 103 (*)     CO2 20.0 (*)     All other components within normal limits    Narrative:     GFR Normal >60  Chronic Kidney Disease <60  Kidney Failure <15     SEDIMENTATION " "RATE - Abnormal; Notable for the following components:    Sed Rate 26 (*)     All other components within normal limits   CBC WITH AUTO DIFFERENTIAL - Abnormal; Notable for the following components:    RBC 3.70 (*)     Hemoglobin 10.4 (*)     Hematocrit 32.9 (*)     Neutrophil % 77.4 (*)     Lymphocyte % 13.1 (*)     Neutrophils, Absolute 7.67 (*)     All other components within normal limits   LACTIC ACID, PLASMA - Normal   HCG, SERUM, QUALITATIVE - Normal   PROCALCITONIN - Normal    Narrative:     As a Marker for Sepsis (Non-Neonates):    1. <0.5 ng/mL represents a low risk of severe sepsis and/or septic shock.  2. >2 ng/mL represents a high risk of severe sepsis and/or septic shock.    As a Marker for Lower Respiratory Tract Infections that require antibiotic therapy:    PCT on Admission    Antibiotic Therapy       6-12 Hrs later    >0.5                Strongly Recommended  >0.25 - <0.5        Recommended   0.1 - 0.25          Discouraged              Remeasure/reassess PCT  <0.1                Strongly Discouraged     Remeasure/reassess PCT    As 28 day mortality risk marker: \"Change in Procalcitonin Result\" (>80% or <=80%) if Day 0 (or Day 1) and Day 4 values are available. Refer to http://www.[employer]-pct-calculator.com    Change in PCT <=80%  A decrease of PCT levels below or equal to 80% defines a positive change in PCT test result representing a higher risk for 28-day all-cause mortality of patients diagnosed with severe sepsis for septic shock.    Change in PCT >80%  A decrease of PCT levels of more than 80% defines a negative change in PCT result representing a lower risk for 28-day all-cause mortality of patients diagnosed with severe sepsis or septic shock.      BLOOD CULTURE   BLOOD CULTURE   RAINBOW DRAW    Narrative:     The following orders were created for panel order [place] Draw.  Procedure                               Abnormality         Status                     ---------                        "        -----------         ------                     Green Top (Gel)[[id number]]                                  Final result               Lavender Top[[id number]]                                     Final result               Red Top[[id number]]                                                                     Sanchez Top[[id number]]                                         In process                 Light Blue Top[[id number]]                                   Final result                 Please view results for these tests on the individual orders.   CBC AND DIFFERENTIAL    Narrative:     The following orders were created for panel order CBC & Differential.  Procedure                               Abnormality         Status                     ---------                               -----------         ------                     CBC Auto Differential[[id number]]        Abnormal            Final result                 Please view results for these tests on the individual orders.   GREEN TOP   LAVENDER TOP   LIGHT BLUE TOP   GRAY TOP       Meds given in ED:   Medications   sodium chloride 0.9 % flush 10 mL (10 mL Intravenous Given 7/27/23 2333)   ondansetron (ZOFRAN) injection 4 mg (4 mg Intravenous Given 7/27/23 2227)   acetaminophen (TYLENOL) tablet 1,000 mg (1,000 mg Oral Given 7/27/23 2226)   HYDROmorphone (DILAUDID) injection 0.5 mg (0.5 mg Intravenous Given 7/27/23 2227)   clindamycin (CLEOCIN) 600 mg in sodium chloride 0.9% 50 mL IVPB (premix) (0 mg Intravenous Stopped 7/28/23 0010)   HYDROmorphone (DILAUDID) injection 0.5 mg (0.5 mg Intravenous Given 7/27/23 2332)           NIH Stroke Scale:       Isolation/Infection(s):  No active isolations   No active infections     COVID Testing  Collected .  Resulted .    Nursing report ED to floor:  Mental status: .  Ambulatory status: .  Precautions: .    ED nurse phone extentsion- ..

## 2023-07-28 NOTE — PROGRESS NOTES
[facility] Medicine Services  INPATIENT PROGRESS NOTE    Patient Name: Mey Valle  Date of Admission: 7/27/2023  Today's Date: 07/28/23  Length of Stay: 0  Primary Care Physician: Lela Daugherty APRN    Subjective   Chief Complaint: Left axilla pain  HPI   Ms. Valle presented to [facility] ER on 7/27/2023 with left axilla pain.  Patient noted small knot with left axilla pain 3 days prior to admission.   noted that lesion was size of nickel and quickly increased to golf ball size.  Patient saw her primary care provider on 7/26 and incision and drainage performed in office.  Patient received Rocephin and Toradol injection and was started on oral clindamycin.  Wound was packed.  She returned to primary care office on 7/27 for additional Rocephin injection.  Patient reported pain continued to worsen and area was not improving.  She had fever of 100.7 upon arrival to ER.  Patient reported chills at home but had not been taking her temperature.  She reported she tried a new deodorant prior to onset of symptoms but unable to recall any injuries.  She does shave her armpits regularly.  WBC 9.9, sed rate 26.  Clindamycin and Dilaudid given in ER.    Today  Patient lying in bed.   in room.  No oxygen in use.  Patient reports severe tenderness left.  2 open areas where I&D performed by PCP.  Area of firm 12 cm x 4 cm.  One 3 cm hardened region under axilla.  General surgery consulted.  Rocephin and vancomycin ordered on admission.    Review of Systems   Constitutional:  Positive for chills and fatigue.   HENT:  Negative for congestion and trouble swallowing.    Eyes:  Negative for photophobia and visual disturbance.   Respiratory:  Negative for cough, shortness of breath and wheezing.    Cardiovascular:  Negative for chest pain, palpitations and leg swelling.   Gastrointestinal:  Negative for constipation, diarrhea, nausea and vomiting.   Endocrine:  Negative for cold intolerance, heat intolerance and polyuria.   Genitourinary:  Negative for dysuria, frequency and urgency.   Musculoskeletal:  Negative for gait problem.   Skin:  Positive for wound (Left axilla).   Allergic/Immunologic: Negative for immunocompromised state.   Neurological:  Positive for weakness. Negative for light-headedness.   Hematological:  Negative for adenopathy. Does not bruise/bleed easily.   Psychiatric/Behavioral:  Negative for agitation, behavioral problems and confusion.       All pertinent negatives and positives are as above. All other systems have been reviewed and are negative unless otherwise stated.     Objective    Temp:  [97.9 °F (36.6 °C)-100.7 °F (38.2 °C)] 98.2 °F (36.8 °C)  Heart Rate:  [67-88] 71  Resp:  [16-20] 16  BP: (104-147)/(54-97) 114/54  Physical Exam  Vitals and nursing note reviewed.   Constitutional:       Appearance: She is obese.      Comments: Lying in bed, no oxygen in use.   in room.   HENT:      Head: Normocephalic and atraumatic.      Nose: No congestion.      Mouth/Throat:      Pharynx: Oropharynx is clear. No oropharyngeal exudate or posterior oropharyngeal erythema.   Eyes:      Extraocular Movements: Extraocular movements intact.      Pupils: Pupils are equal, round, and reactive to light.   Cardiovascular:      Rate and Rhythm: Normal rate and regular rhythm.   Pulmonary:      Breath sounds: No wheezing, rhonchi or rales.   Abdominal:      Palpations: Abdomen is soft.      Tenderness: There is no abdominal tenderness.   Genitourinary:     Comments: Voiding  Musculoskeletal:      Cervical back: Normal range of motion and neck supple.   Skin:     Comments:  Left axilla with 2 small linear incisions from previous outpatient I&D. Scant amount of serous drainage noted. Surrounding erythema and induration noted.  12 cm x 4 cm.  3 cm hardened region lower part of axilla.  Area of erythema marked for comparison.   Neurological:      General: No focal  deficit present.      Mental Status: She is alert and oriented to person, place, and time.   Psychiatric:         Mood and Affect: Mood normal.         Behavior: Behavior normal.         Thought Content: Thought content normal.         Judgment: Judgment normal.     Results Review:  I have reviewed the labs, radiology results, and diagnostic studies.    Laboratory Data:   Results from last 7 days   Lab Units 07/28/23  0440 07/27/23  2219   WBC 10*3/mm3 7.57 9.91   HEMOGLOBIN g/dL 9.4* 10.4*   HEMATOCRIT % 30.6* 32.9*   PLATELETS 10*3/mm3 191 220     Results from last 7 days   Lab Units 07/28/23  0440 07/27/23  2219   SODIUM mmol/L 138 137   POTASSIUM mmol/L 4.0 4.3   CHLORIDE mmol/L 106 105   CO2 mmol/L 25.0 20.0*   BUN mg/dL 6 8   CREATININE mg/dL 0.70 0.72   CALCIUM mg/dL 8.5* 8.8   BILIRUBIN mg/dL  --  0.5   ALK PHOS U/L  --  61   ALT (SGPT) U/L  --  29   AST (SGOT) U/L  --  24   GLUCOSE mg/dL 92 103*     Culture Data:   No results found for: BLOODCX, URINECX, WOUNDCX, MRSACX, RESPCX, STOOLCX    Radiology Data:   Imaging Results (Last 24 Hours)       Procedure Component Value Units Date/Time    US Axilla Left [[id number]] Collected: 07/28/23 1031     Updated: 07/28/23 1035    Narrative:      HISTORY: Left axillary inflammation     Left axilla ultrasound: Sonographic imaging left axilla was performed  beneath the site of the incision.     There is regional edema and inflammatory change. No discrete drainable  abscess collection at this time.       Impression:      1. No drainable abscess collection identified within the left axilla.  Diffuse edema and inflammation.  This report was finalized on 07/28/2023 10:32 by Dr. Ana María Nelson MD.          Scheduled medications:  cefTRIAXone, 2,000 mg, Intravenous, Q24H  FLUoxetine, 20 mg, Oral, Daily  senna-docusate sodium, 2 tablet, Oral, BID  sodium chloride, 10 mL, Intravenous, Q12H  traZODone, 50 mg, Oral, Nightly  vancomycin, 1,500 mg, Intravenous, Q12H           I  have reviewed the patient's current medications.     Assessment/Plan   Assessment  Active Hospital Problems    Diagnosis     **Cellulitis left axilla     Left axillary abscess     Class 3 severe obesity with body mass index (BMI) of 40.0 to 44.9 in adult     Anxiety and depression      Treatment Plan  1.  Cellulitis left axilla.  Patient had small knot left axilla 3 days prior to admission nickel in size progressed to golf ball size.  Primary care provider performed I&D on 7/26.  Patient received Rocephin and started on oral clindamycin.  Wound packed.  Patient returned to PCP on 7/27 for additional Rocephin injection.  Patient reported fever at home with no improvement.  Clindamycin given in ER.  Rocephin and vancomycin continued.    2.  Ultrasound left axilla reports no drainable abscess collection identified within the left axilla.  General surgery consulted and Dr. Enriquez notes 2 areas open and drain with serous type fluid extruding.  No axillary abscess on ultrasound.  Cellulitis improved with vancomycin and Rocephin.  No surgical intervention at this time.  Elevate left arm.  Pack open areas with iodoform gauze.    3.  Blood cultures no growth.    4.  Anxiety with depression.  Continue Prozac and trazodone.    5.  CBC and BMP in AM.    Medical Decision Making  Number and Complexity of problems: 3  1 acute illness cellulitis left axilla: Acute, moderate complexity  Anxiety and depression: Chronic, low complexity  Class III obesity with BMI 41.7.  Chronic, low complexity    Differential Diagnosis: None    Conditions and Status        Condition is improving.     [facility] Data  External documents reviewed: Reviewed primary care provider office visit 7/26/2023 and 7/27/2023.  Cardiac tracing (EKG, telemetry) interpretation: None  Radiology interpretation: Reviewed ultrasound left axilla  Labs reviewed:   BMP 7/28/2023.  Repeat BMP in AM.  CBC 7/28/2023.  Repeat CBC in AM.  Blood cultures pending    Any tests that were  considered but not ordered: None     Decision rules/scores evaluated (example [id number], Wells, etc): None     Discussed with: Dr. Cho, patient, and  Sg.     Care Planning  Shared decision making: Dr. Cho, patient, and  Sg.  Patient agrees to IV antibiotics, elevation left upper extremity, ultrasound left axilla, general surgery consultation.  Code status and discussions: Full code  Patient surrogate decision maker is her , Sg.    Disposition  Social Determinants of Health that impact treatment or disposition: None  I expect the patient to be discharged to home in 2 days.     Electronically signed by RYLEE Valdez, 07/28/23, 15:05 CDT.

## 2023-07-28 NOTE — CONSULTS
"Pharmacy Dosing Service  Pharmacokinetics  Vancomycin Initial Evaluation  Assessment/Action/Plan:  Loading dose: 2,500 mg  Current Order: Vancomycin 1,500 mg IVPB every 12 hours  Current end date/final dose: 7/31/23 at 0200  Levels: None ordered at this time  Additional antimicrobial agent(s): Rocephin    Vancomycin dosage initiated based on population pharmacokinetic parameters. Pharmacy will continue to follow daily and adjust dose accordingly.     Subjective:  Mey Valle is a 32 y.o. female with a Vancomycin \"Pharmacy to Dose\" consult for the treatment of SSTI (Left axillary abscess) , day 1 of treatment.    AUC Model Data:  Loading dose: 2500 mg at 02:00 07/28/2023.  Regimen: 1500 mg IV every 12 hours.  Start time: 14:00 on 07/28/2023  Exposure target: AUC24 (range)400-600 mg/L.hr   AUC24,ss: 538 mg/L.hr  PAUC*: 71 %  Ctrough,ss: 13.6 mg/L  Pconc*: 33 %  Tox.: 9 %    Objective:  Ht: 175.3 cm (69\"); Wt: 128 kg (282 lb)  Estimated Creatinine Clearance: 162.5 mL/min (by C-G formula based on SCr of 0.72 mg/dL).   Creatinine   Date Value Ref Range Status   07/27/2023 0.72 0.57 - 1.00 mg/dL Final      Lab Results   Component Value Date    WBC 9.91 07/27/2023      Baseline culture results:  Microbiology Results (last 10 days)       ** No results found for the last 240 hours. **            Shin Ramirez, PharmD  07/28/23 01:34 CDT    "

## 2023-07-28 NOTE — CONSULTS
Patient Care Team:  Lela Daugherty APRN as PCP - General (Family Medicine)    Chief complaint left axillary cellulitis    Subjective     Subjective .     History of present illness: Ms. Martínez is a very nice 32-year-old female who follows with Dr. Manas Rivera nurse practitioner and primary care.  She has a medical history significant for anxiety and depression and presented to the emergency room on Thursday, the 28th with complaints of left axillary pain and cellulitis.  With the above problems she was admitted she had 2 areas opened and drained but the cellulitis was present and she is subsequently admitted general surgery asked to see and evaluate and to assure no drainable abscess.  The areas seem to be much improved there was a line of cellulitis that was marked 1 day prior and this is much better on the vancomycin treatment that she is on.  Ultrasound has been completed showing there is no drainable abscess noted.      Past surgical history is significant for cholecystectomy and cholangiography, D&C, inner ear surgery.    Medical problems significant for anxiety and depression.    She quit smoking 7 years prior, does not use smokeless tobacco, no drinking and no drug use.    No known drug allergies.    Medications Prozac 20 mg daily, Ativan 1 mg 2 times a day as needed.  She was on Cleocin 300 mg to 3 times a day and does a real 50 mg q. night Percocet No. 5 for the pain in the axilla.  Review of Systems  Pertinent items are noted in HPI, all other systems reviewed and negative    History  Past Medical History:   Diagnosis Date    Anxiety     Depression    ,   Past Surgical History:   Procedure Laterality Date    CHOLECYSTECTOMY WITH INTRAOPERATIVE CHOLANGIOGRAM N/A 2/21/2019    Procedure: CHOLECYSTECTOMY LAPAROSCOPIC INTRAOPERATIVE CHOLANGIOGRAM;  Surgeon: Cabrera Enriquez MD;  Location: [facility];  Service: General    DILATATION AND CURETTAGE      INNER EAR SURGERY     , History  reviewed. No pertinent family history.,   Social History     Tobacco Use    Smoking status: Former     Types: Cigarettes     Quit date: 2016     Years since quittin.4    Smokeless tobacco: Never   Vaping Use    Vaping Use: Never used   Substance Use Topics    Alcohol use: Not Currently    Drug use: No   ,   Medications Prior to Admission   Medication Sig Dispense Refill Last Dose    clindamycin (CLEOCIN) 300 MG capsule Take 1 capsule by mouth 3 (Three) Times a Day.   2023    FLUoxetine (PROzac) 20 MG capsule Take 1 capsule by mouth Daily.   Past Week    LORazepam (ATIVAN) 1 MG tablet Take 1 tablet by mouth 2 (Two) Times a Day.   2023    oxyCODONE-acetaminophen (PERCOCET) 5-325 MG per tablet Take 1 tablet by mouth Every 6 (Six) Hours As Needed. Prescribed 3 day supply only   2023    traZODone (DESYREL) 50 MG tablet Take 1 tablet by mouth Every Night.   2023    ondansetron (ZOFRAN) 4 MG tablet Take 1 tablet by mouth Every 8 (Eight) Hours As Needed for Nausea or Vomiting.       Semaglutide-Weight Management 0.25 MG/0.5ML solution auto-injector Inject 0.5 mL under the skin into the appropriate area as directed Every 7 (Seven) Days. Tuesday      , Scheduled Meds:  cefTRIAXone, 2,000 mg, Intravenous, Q24H  FLUoxetine, 20 mg, Oral, Daily  senna-docusate sodium, 2 tablet, Oral, BID  sodium chloride, 10 mL, Intravenous, Q12H  traZODone, 50 mg, Oral, Nightly  vancomycin, 1,500 mg, Intravenous, Q12H    , Continuous Infusions:  sodium chloride, 50 mL/hr, Last Rate: 50 mL/hr (23 0128)    , PRN Meds:    acetaminophen    senna-docusate sodium **AND** polyethylene glycol **AND** bisacodyl **AND** bisacodyl    HYDROmorphone **AND** naloxone    LORazepam    oxyCODONE-acetaminophen    sodium chloride    sodium chloride    sodium chloride and Allergies:  Patient has no known allergies.    Objective      Objective     Vital Signs   Temp:  [97.9 °F (36.6 °C)-100.7 °F (38.2 °C)] 98.2 °F (36.8 °C)  Heart  Rate:  [67-88] 71  Resp:  [16-20] 16  BP: (104-147)/(54-97) 114/54    Intake & Output (last 3 days)         07/25 0701 07/26 0700 07/26 0701 07/27 0700 07/27 0701 07/28 0700 07/28 0701 07/29 0700    Urine (mL/kg/hr)   200     Total Output   200     Net   -200                      Physical Exam:     General Appearance:    Alert, cooperative, in no acute distress mildly obese, BMI 42, she has some skin changes and areas marked in the left axilla where previous cellulitis was noted.   Head:    Normocephalic, without obvious abnormality, atraumatic   Eyes:            Lids and lashes normal, conjunctivae and sclerae normal, no   icterus, no pallor, corneas clear, PERRLA   Ears:    Ears appear intact with no abnormalities noted   Throat:   No oral lesions, no thrush, oral mucosa moist   Neck:   No adenopathy, supple, trachea midline, no thyromegaly, no   carotid bruit, no JVD   Back:     No kyphosis present, no scoliosis present, no skin lesions,      erythema or scars, no tenderness to percussion or                   palpation,   range of motion normal   Lungs:     Clear to auscultation,respirations regular, even and                  unlabored    Heart:    Regular rhythm and normal rate, normal S1 and S2, no            murmur, no gallop, no rub, no click   Chest Wall:  In the left axilla since she had previous cellulitis noted and was marked the area of erythema seems to be much improved from what it was given that the cellulitis was marked I do not see a lot of redness at present, there are 2 small linear incisions that were completed as an outpatient incision and drainage, there is mostly serous drainage noted, the induration and erythema is much improved per patient, and ultrasound has been accomplished showing no drainable abscess.  Narrative & Impression   HISTORY: Left axillary inflammation     Left axilla ultrasound: Sonographic imaging left axilla was performed  beneath the site of the incision.     There is  regional edema and inflammatory change. No discrete drainable  abscess collection at this time.     IMPRESSION:  1. No drainable abscess collection identified within the left axilla.  Diffuse edema and inflammation.  This report was finalized on 07/28/2023 10:32 by Dr. Ana María Nelson MD.     Imaging     Abdomen:     Normal bowel sounds, no masses, no organomegaly, soft        nontender, nondistended, no guarding, no rebound tenderness unremarkable previous incisions from laparoscopic cholecystectomy   Rectal:     Deferred   Extremities:   Moves all extremities well, no edema, no cyanosis, no             redness   Pulses:   Pulses palpable and equal bilaterally   Skin:   No bleeding, bruising or rash   Lymph nodes:   No palpable adenopathy   Neurologic:   Cranial nerves 2 - 12 grossly intact, sensation intact, DTR       present and equal bilaterally current ESR is 26        Results from last 7 days   Lab Units 07/28/23  0440 07/27/23  2219   WBC 10*3/mm3 7.57 9.91   HEMOGLOBIN g/dL 9.4* 10.4*   HEMATOCRIT % 30.6* 32.9*   PLATELETS 10*3/mm3 191 220        Results from last 7 days   Lab Units 07/28/23  0440 07/27/23  2219   SODIUM mmol/L 138 137   POTASSIUM mmol/L 4.0 4.3   CHLORIDE mmol/L 106 105   CO2 mmol/L 25.0 20.0*   BUN mg/dL 6 8   CREATININE mg/dL 0.70 0.72   CALCIUM mg/dL 8.5* 8.8   BILIRUBIN mg/dL  --  0.5   ALK PHOS U/L  --  61   ALT (SGPT) U/L  --  29   AST (SGOT) U/L  --  24   GLUCOSE mg/dL 92 103*               Component  Ref Range & Units 04:40  (7/28/23)  Lissa/[place] 1 d ago  (7/27/23)  Lissa/[place] 2 yr ago  (4/15/21)  Lissa/[place] 2 yr ago  (9/24/20)  [place] 3 yr ago  (5/5/20)  [place] 3 yr ago  (5/3/20)  [place] 3 yr ago  (4/8/20)  [place]   WBC  3.40 - 10.80 10*3/mm3 7.57 9.91 8.55 6.7 R 10.0 R 11.4 High  R 9.9 R   RBC  3.77 - 5.28 10*6/mm3 3.37 Low  3.70 Low  3.94 4.19 Low  R 3.27 Low  R 3.64 Low  R 3.65 Low  R   Hemoglobin  12.0 - 15.9 g/dL 9.4 Low   10.4 Low  11.5 Low  12.2 R 10.1 Low  R 11.3 Low  R 11.1 Low  R   Hematocrit  34.0 - 46.6 % 30.6 Low  32.9 Low  34.1 36.9 Low  R 30.5 Low  R 33.0 Low  R 34.0 Low  R   MCV  79.0 - 97.0 fL 90.8 88.9 86.5 88.1 R 93.3 R 90.7 R 93.2 R   MCH  26.6 - 33.0 pg 27.9 28.1 29.2 29.1 R 30.9 R 31.0 R 30.4 R   MCHC  31.5 - 35.7 g/dL 30.7 Low  31.6 33.7 33.1 R 33.1 R 34.2 R 32.6 Low  R   RDW  12.3 - 15.4 % 14.2 13.9 13.3 12.7 R 14.3 R 14.0 R 13.8 R   RDW-SD  37.0 - 54.0 fl 47.5 45.2 42.3       MPV  6.0 - 12.0 fL 11.5 11.6 11.6 12.2 R 12.1 R 12.0 R 11.9 R   Platelets  140 - 450 10*3/mm3 191 220 261 261 R 185 R 231 R 236 R   Resulting Agency  [facility]  [facility]  [facility]          Results Review:   I reviewed the patient's new clinical results.    Class 3 Severe Obesity (BMI >=40). Obesity-related health conditions include the following: none. Obesity is newly identified. BMI is is above average; BMI management plan is completed. We discussed portion control and increasing exercise.    Assessment/Plan     Assessment & Plan       Cellulitis    Axillary abscess      Patient with anxiety, obesity with a BMI of 42, now with a recent left axillary abscess.  2 areas were opened and drained with serous type fluid extruding, there is no axillary abscess, and the cellulitis seems to be much improved with the previous drainage and the vancomycin.  No surgical intervention will be needed continue to elevate the arm continue vancomycin continue to pack the 2 areas with quarter inch iodoform gauze, I will follow along with you but no plans for suggestion of any surgical intervention.  Thank you so much for this consultation.    [x] X-Ray  [x] Reviewed Records  [x] Called Physician/Consulted      I discussed the patient's findings and my recommendations with patient, family, nursing staff, and primary care  team    Cabrera Enriquez MD  07/28/23  14:28 CDT    Time: Time spent with patient 45 minutes     Part of this note may be an electronic transcription/translation of spoken language to printed text using the Dragon Dictation System.

## 2023-07-28 NOTE — H&P
[facility] Medicine Services  HISTORY AND PHYSICAL    Date of Admission: 7/27/2023  Primary Care Physician: Lela Daugherty APRN    Subjective   Primary Historian: Patient    Chief Complaint: Left axillary pain    History of Present Illness  Ms. Valle is a pleasant 32-year-old  female who follows RYLEE Waters for primary care.  She has a medical history significant for anxiety and depression.  Patient presents to the emergency department with complaints of left axillary pain after feeling outpatient treatment for a left axillary abscess.  Please see assessment and plan below.    Review of Systems   Otherwise complete ROS reviewed and negative except as mentioned in the HPI.    Past Medical History:   Past Medical History:   Diagnosis Date    Anxiety     Depression      Past Surgical History:  Past Surgical History:   Procedure Laterality Date    CHOLECYSTECTOMY WITH INTRAOPERATIVE CHOLANGIOGRAM N/A 2/21/2019    Procedure: CHOLECYSTECTOMY LAPAROSCOPIC INTRAOPERATIVE CHOLANGIOGRAM;  Surgeon: Cabrera Enriquez MD;  Location: [facility];  Service: General    DILATATION AND CURETTAGE      INNER EAR SURGERY       Social History:  reports that she quit smoking about 7 years ago. She has never used smokeless tobacco. She reports that she does not drink alcohol and does not use drugs.    Family History: family history is not on file.       Allergies:  No Known Allergies    Medications:  Prior to Admission medications    Medication Sig Start Date End Date Taking? Authorizing Provider   FLUoxetine (PROzac) 20 MG capsule Take 1 capsule by mouth Daily.   Yes Blaise Urrutia MD   LORazepam (ATIVAN) 1 MG tablet Take 1 tablet by mouth 2 (Two) Times a Day.   Yes Blaise Urrutia MD   oxyCODONE-acetaminophen (PERCOCET) 5-325 MG per tablet Take 1 tablet by mouth Every 6 (Six) Hours As Needed. Prescribed 3 day supply only   Yes Blaise Urrutia MD   traZODone  "(DESYREL) 50 MG tablet Take 1 tablet by mouth Every Night.   Yes Provider, MD Blaise   clindamycin (CLEOCIN) 300 MG capsule Take 1 capsule by mouth 3 (Three) Times a Day.    Provider, MD Blaise     I have utilized all available immediate resources to obtain, update, or review the patient's current medications (including all prescriptions, over-the-counter products, herbals, cannabis/cannabidiol products, and vitamin/mineral/dietary (nutritional) supplements).    Objective     Vital Signs: /76   Pulse 78   Temp (!) 100.7 °F (38.2 °C) (Oral)   Resp 20   Ht 175.3 cm (69\")   Wt 128 kg (282 lb)   LMP 07/13/2023   SpO2 96%   BMI 41.64 kg/m²   Physical Exam  Vitals and nursing note reviewed.   Constitutional:       General: She is not in acute distress.     Appearance: She is obese. She is not ill-appearing or toxic-appearing.   HENT:      Head: Normocephalic and atraumatic.      Mouth/Throat:      Mouth: Mucous membranes are moist.      Pharynx: Oropharynx is clear.   Cardiovascular:      Rate and Rhythm: Normal rate and regular rhythm.      Pulses: Normal pulses.      Heart sounds: Normal heart sounds.   Pulmonary:      Effort: Pulmonary effort is normal.      Breath sounds: No wheezing, rhonchi or rales.   Chest:      Comments: Left axilla with 2 small linear incisions from previous outpatient I&D. Scant amount of serous drainage noted. Surrounding erythema and induration noted. There is another small nodule in the lower axilla with central pustule, surrounding erythema and induration as well.  Abdominal:      General: Bowel sounds are normal. There is no distension.      Palpations: Abdomen is soft.      Tenderness: There is no abdominal tenderness.   Musculoskeletal:         General: No swelling or tenderness. Normal range of motion.      Cervical back: Normal range of motion and neck supple. No tenderness.   Skin:     General: Skin is warm and dry.      Findings: No erythema or rash. "   Neurological:      General: No focal deficit present.      Mental Status: She is alert and oriented to person, place, and time.   Psychiatric:         Mood and Affect: Mood normal.         Behavior: Behavior normal.         Thought Content: Thought content normal.         Judgment: Judgment normal.      Results Reviewed:  Lab Results (last 24 hours)       Procedure Component Value Units Date/Time    [place] Draw [[id number]] Collected: 07/27/23 2219    Specimen: Blood Updated: 07/27/23 2330    Narrative:      The following orders were created for panel order [place] Draw.  Procedure                               Abnormality         Status                     ---------                               -----------         ------                     Green Top (Gel)[[id number]]                                  Final result               Lavender Top[[id number]]                                     Final result               Red Top[[id number]]                                                                     Sanchez Top[[id number]]                                         In process                 Light Blue Top[[id number]]                                   Final result                 Please view results for these tests on the individual orders.    Light Blue Top [[id number]] Collected: 07/27/23 2219    Specimen: Blood Updated: 07/27/23 2330     Extra Tube Hold for add-ons.     Comment: Auto resulted       Green Top (Gel) [[id number]] Collected: 07/27/23 2219    Specimen: Blood Updated: 07/27/23 2330     Extra Tube Hold for add-ons.     Comment: Auto resulted.       Lavender Top [[id number]] Collected: 07/27/23 2219    Specimen: Blood Updated: 07/27/23 2330     Extra Tube hold for add-on     Comment: Auto resulted       Procalcitonin [[id number]]  (Normal) Collected: 07/27/23 2219    Specimen: Blood Updated: 07/27/23 2255     Procalcitonin 0.05 ng/mL     Narrative:      As a Marker for Sepsis (Non-Neonates):    1. <0.5 ng/mL  "represents a low risk of severe sepsis and/or septic shock.  2. >2 ng/mL represents a high risk of severe sepsis and/or septic shock.    As a Marker for Lower Respiratory Tract Infections that require antibiotic therapy:    PCT on Admission    Antibiotic Therapy       6-12 Hrs later    >0.5                Strongly Recommended  >0.25 - <0.5        Recommended   0.1 - 0.25          Discouraged              Remeasure/reassess PCT  <0.1                Strongly Discouraged     Remeasure/reassess PCT    As 28 day mortality risk marker: \"Change in Procalcitonin Result\" (>80% or <=80%) if Day 0 (or Day 1) and Day 4 values are available. Refer to http://[email or web address]    Change in PCT <=80%  A decrease of PCT levels below or equal to 80% defines a positive change in PCT test result representing a higher risk for 28-day all-cause mortality of patients diagnosed with severe sepsis for septic shock.    Change in PCT >80%  A decrease of PCT levels of more than 80% defines a negative change in PCT result representing a lower risk for 28-day all-cause mortality of patients diagnosed with severe sepsis or septic shock.       Comprehensive Metabolic Panel [[id number]]  (Abnormal) Collected: 07/27/23 2219    Specimen: Blood Updated: 07/27/23 2249     Glucose 103 mg/dL      BUN 8 mg/dL      Creatinine 0.72 mg/dL      Sodium 137 mmol/L      Potassium 4.3 mmol/L      Chloride 105 mmol/L      CO2 20.0 mmol/L      Calcium 8.8 mg/dL      Total Protein 6.4 g/dL      Albumin 3.9 g/dL      ALT (SGPT) 29 U/L      AST (SGOT) 24 U/L      Alkaline Phosphatase 61 U/L      Total Bilirubin 0.5 mg/dL      Globulin 2.5 gm/dL      A/G Ratio 1.6 g/dL      BUN/Creatinine Ratio 11.1     Anion Gap 12.0 mmol/L      eGFR 114.8 mL/min/1.73     Narrative:      GFR Normal >60  Chronic Kidney Disease <60  Kidney Failure <15      Lactic Acid, Plasma [[id number]]  (Normal) Collected: 07/27/23 2219    Specimen: Blood Updated: 07/27/23 2246     Lactate " 0.7 mmol/L     hCG, Serum, Qualitative [[id number]]  (Normal) Collected: 07/27/23 2219    Specimen: Blood Updated: 07/27/23 2241     HCG Qualitative Negative    Sedimentation Rate [[id number]]  (Abnormal) Collected: 07/27/23 2219    Specimen: Blood Updated: 07/27/23 2241     Sed Rate 26 mm/hr     Blood Culture - Blood, Arm, Right [[id number]] Collected: 07/27/23 2219    Specimen: Blood from Arm, Right Updated: 07/27/23 2231    Blood Culture - Blood, Hand, Right [[id number]] Collected: 07/27/23 2219    Specimen: Blood from Hand, Right Updated: 07/27/23 2231    CBC & Differential [[id number]]  (Abnormal) Collected: 07/27/23 2219    Specimen: Blood Updated: 07/27/23 2230    Narrative:      The following orders were created for panel order CBC & Differential.  Procedure                               Abnormality         Status                     ---------                               -----------         ------                     CBC Auto Differential[[id number]]        Abnormal            Final result                 Please view results for these tests on the individual orders.    CBC Auto Differential [[id number]]  (Abnormal) Collected: 07/27/23 2219    Specimen: Blood Updated: 07/27/23 2230     WBC 9.91 10*3/mm3      RBC 3.70 10*6/mm3      Hemoglobin 10.4 g/dL      Hematocrit 32.9 %      MCV 88.9 fL      MCH 28.1 pg      MCHC 31.6 g/dL      RDW 13.9 %      RDW-SD 45.2 fl      MPV 11.6 fL      Platelets 220 10*3/mm3      Neutrophil % 77.4 %      Lymphocyte % 13.1 %      Monocyte % 7.4 %      Eosinophil % 1.4 %      Basophil % 0.2 %      Immature Grans % 0.5 %      Neutrophils, Absolute 7.67 10*3/mm3      Lymphocytes, Absolute 1.30 10*3/mm3      Monocytes, Absolute 0.73 10*3/mm3      Eosinophils, Absolute 0.14 10*3/mm3      Basophils, Absolute 0.02 10*3/mm3      Immature Grans, Absolute 0.05 10*3/mm3      nRBC 0.0 /100 WBC     Sanchez Top [[id number]] Collected: 07/27/23 2219    Specimen: Blood Updated: 07/27/23 2228           Imaging Results (Last 24 Hours)       ** No results found for the last 24 hours. **          I have personally reviewed and interpreted the radiology studies and ECG obtained at time of admission.     Assessment / Plan   Assessment:   Active Hospital Problems    Diagnosis     **Cellulitis     Axillary abscess        Treatment Plan  The patient will be admitted to the hospitalist service here at [facility].  The patient presents to the emergency department with complaints of left axillary pain.  She first noted a small knot in the left axillary area about 3 days ago.  Her  at bedside states that the lesion was about the size of a nickel and quickly progressed to golf ball size.  The patient saw her primary care provider on 7/26 and did have an incision and drainage in the office.  She was given Rocephin and Toradol injections and started on oral clindamycin.  Her wound was packed.  She returned to the office today for additional Rocephin injection.  Despite this, the patient's pain continued to worsen and the abscess was not improving.  The patient did have a fever noted on arrival to the emergency department at 100.7 degrees.  She has had some cold chills at home but has not been taking her temperature.    The patient states that she did try a new deodorant prior to the onset of symptoms, but cannot recall any injuries of any kind.  She does shave her armpits regularly.     The patient was given a dose of IV clindamycin in the emergency department.  We will start vancomycin and Rocephin.  Blood cultures pending.  Patient will be made nothing by mouth and would like for general surgery to evaluate her in the morning to see if further incision and drainage will be necessary.  Pain control with Percocet or Dilaudid as needed.  We will give gentle IV fluids overnight as she will be nothing by mouth.  SCDs for DVT prophylaxis.  Home medications reviewed and resumed as appropriate.    Medical  Decision Making  Number and Complexity of problems: Moderate-1 acute illness with systemic symptoms.  Differential Diagnosis: Hidradenitis suppurativa    Conditions and Status        Condition is worsening.     [facility] Data  External documents reviewed: Reviewed office visit notes with the patient's primary care provider from 7/26/2023 and 7/27/2023.  Cardiac tracing (EKG, telemetry) interpretation: N/A  Radiology interpretation: N/A  Labs reviewed: Procalcitonin (07/27/2023 22:19) Sedimentation Rate (07/27/2023 22:19) Lactic Acid, Plasma (07/27/2023 22:19) Comprehensive Metabolic Panel (07/27/2023 22:19) CBC & Differential (07/27/2023 22:19)   Any tests that were considered but not ordered: Soft tissue US or CT of the left upper extremity     Decision rules/scores evaluated (example [id number], Wells, etc): N/A     Discussed with: Dr. Lucius Mckeon    Risk: High- Decision regarding admission to the hospital.     Care Planning  Shared decision making: Patient and  agree with admission to the hospital and the plan of care as outlined above.  Code status and discussions: Full code    Disposition  Social Determinants of Health that impact treatment or disposition: N/A  Estimated length of stay is To be determined.     I confirmed that the patient's advanced care plan is present, code status is documented, and a surrogate decision maker is listed in the patient's medical record.     The patient's surrogate decision maker is Her  Sg.     The patient was seen and examined by me on 7/28/2023 at 0005.    Electronically signed by RYLEE Fierro, 07/28/23, 00:24 CDT.

## 2023-07-29 VITALS
HEART RATE: 73 BPM | RESPIRATION RATE: 16 BRPM | HEIGHT: 69 IN | BODY MASS INDEX: 41.83 KG/M2 | TEMPERATURE: 97.8 F | DIASTOLIC BLOOD PRESSURE: 69 MMHG | OXYGEN SATURATION: 97 % | SYSTOLIC BLOOD PRESSURE: 118 MMHG | WEIGHT: 282.4 LBS

## 2023-07-29 LAB
ANION GAP SERPL CALCULATED.3IONS-SCNC: 10 MMOL/L (ref 5–15)
BASOPHILS # BLD AUTO: 0.03 10*3/MM3 (ref 0–0.2)
BASOPHILS NFR BLD AUTO: 0.4 % (ref 0–1.5)
BUN SERPL-MCNC: 9 MG/DL (ref 6–20)
BUN/CREAT SERPL: 15.5 (ref 7–25)
CALCIUM SPEC-SCNC: 8.7 MG/DL (ref 8.6–10.5)
CHLORIDE SERPL-SCNC: 107 MMOL/L (ref 98–107)
CO2 SERPL-SCNC: 22 MMOL/L (ref 22–29)
CREAT SERPL-MCNC: 0.58 MG/DL (ref 0.57–1)
DEPRECATED RDW RBC AUTO: 47.1 FL (ref 37–54)
EGFRCR SERPLBLD CKD-EPI 2021: 124.3 ML/MIN/1.73
EOSINOPHIL # BLD AUTO: 0.28 10*3/MM3 (ref 0–0.4)
EOSINOPHIL NFR BLD AUTO: 4.2 % (ref 0.3–6.2)
ERYTHROCYTE [DISTWIDTH] IN BLOOD BY AUTOMATED COUNT: 14 % (ref 12.3–15.4)
GLUCOSE SERPL-MCNC: 104 MG/DL (ref 65–99)
HCT VFR BLD AUTO: 31.3 % (ref 34–46.6)
HGB BLD-MCNC: 9.4 G/DL (ref 12–15.9)
IMM GRANULOCYTES # BLD AUTO: 0.03 10*3/MM3 (ref 0–0.05)
IMM GRANULOCYTES NFR BLD AUTO: 0.4 % (ref 0–0.5)
LYMPHOCYTES # BLD AUTO: 1.82 10*3/MM3 (ref 0.7–3.1)
LYMPHOCYTES NFR BLD AUTO: 27.1 % (ref 19.6–45.3)
MCH RBC QN AUTO: 27.5 PG (ref 26.6–33)
MCHC RBC AUTO-ENTMCNC: 30 G/DL (ref 31.5–35.7)
MCV RBC AUTO: 91.5 FL (ref 79–97)
MONOCYTES # BLD AUTO: 0.54 10*3/MM3 (ref 0.1–0.9)
MONOCYTES NFR BLD AUTO: 8 % (ref 5–12)
NEUTROPHILS NFR BLD AUTO: 4.02 10*3/MM3 (ref 1.7–7)
NEUTROPHILS NFR BLD AUTO: 59.9 % (ref 42.7–76)
NRBC BLD AUTO-RTO: 0 /100 WBC (ref 0–0.2)
PLATELET # BLD AUTO: 205 10*3/MM3 (ref 140–450)
PMV BLD AUTO: 11.8 FL (ref 6–12)
POTASSIUM SERPL-SCNC: 3.9 MMOL/L (ref 3.5–5.2)
RBC # BLD AUTO: 3.42 10*6/MM3 (ref 3.77–5.28)
SODIUM SERPL-SCNC: 139 MMOL/L (ref 136–145)
WBC NRBC COR # BLD: 6.72 10*3/MM3 (ref 3.4–10.8)

## 2023-07-29 PROCEDURE — 96367 TX/PROPH/DG ADDL SEQ IV INF: CPT

## 2023-07-29 PROCEDURE — G0378 HOSPITAL OBSERVATION PER HR: HCPCS

## 2023-07-29 PROCEDURE — 80048 BASIC METABOLIC PNL TOTAL CA: CPT | Performed by: NURSE PRACTITIONER

## 2023-07-29 PROCEDURE — 25010000002 VANCOMYCIN 10 G RECONSTITUTED SOLUTION: Performed by: FAMILY MEDICINE

## 2023-07-29 PROCEDURE — 96376 TX/PRO/DX INJ SAME DRUG ADON: CPT

## 2023-07-29 PROCEDURE — 85025 COMPLETE CBC W/AUTO DIFF WBC: CPT | Performed by: NURSE PRACTITIONER

## 2023-07-29 PROCEDURE — 25010000002 HYDROMORPHONE PER 4 MG: Performed by: NURSE PRACTITIONER

## 2023-07-29 RX ADMIN — DOCUSATE SODIUM 50 MG AND SENNOSIDES 8.6 MG 2 TABLET: 8.6; 5 TABLET, FILM COATED ORAL at 08:48

## 2023-07-29 RX ADMIN — FLUOXETINE HYDROCHLORIDE 20 MG: 20 CAPSULE ORAL at 08:48

## 2023-07-29 RX ADMIN — OXYCODONE HYDROCHLORIDE AND ACETAMINOPHEN 1 TABLET: 7.5; 325 TABLET ORAL at 14:45

## 2023-07-29 RX ADMIN — OXYCODONE HYDROCHLORIDE AND ACETAMINOPHEN 1 TABLET: 7.5; 325 TABLET ORAL at 08:51

## 2023-07-29 RX ADMIN — HYDROMORPHONE HYDROCHLORIDE 0.5 MG: 1 INJECTION, SOLUTION INTRAMUSCULAR; INTRAVENOUS; SUBCUTANEOUS at 06:21

## 2023-07-29 RX ADMIN — VANCOMYCIN HYDROCHLORIDE 1500 MG: 10 INJECTION, POWDER, LYOPHILIZED, FOR SOLUTION INTRAVENOUS at 01:40

## 2023-07-29 RX ADMIN — VANCOMYCIN HYDROCHLORIDE 1500 MG: 10 INJECTION, POWDER, LYOPHILIZED, FOR SOLUTION INTRAVENOUS at 13:46

## 2023-07-29 RX ADMIN — OXYCODONE HYDROCHLORIDE AND ACETAMINOPHEN 1 TABLET: 7.5; 325 TABLET ORAL at 01:51

## 2023-07-29 NOTE — PROGRESS NOTES
LOS: 0 days   Patient Care Team:  Lela Daugherty APRN as PCP - General (Family Medicine)    Chief Complaint: Left axillary cellulitis    Subjective     Subjective     Hospital day #2 for admission for left axillary cellulitis.  She had several areas opened and drained we continue this drainage with iodoform gauze, and vancomycin, she appears much better she is moving her arm better she continues with some drainage from these openings, but overall improved.  Hospitalist of [facility] has discharged her I would prefer that she stay in for another day but at the very minimum we will give her another dose of vancomycin at 1500 and she may be discharged at that time she will need to continue the wound packing at home.  Objective      Objective     Vital Signs  Temp:  [97.9 °F (36.6 °C)-98.3 °F (36.8 °C)] 98 °F (36.7 °C)  Heart Rate:  [60-75] 60  Resp:  [16] 16  BP: (105-128)/(54-72) 105/56    Intake & Output (last 3 days)         07/26 0701  07/27 0700 07/27 0701  07/28 0700 07/28 0701  07/29 0700 07/29 0701  07/30 0700    P.O.   480 222    Total Intake(mL/kg)   480 (3.8) 222 (1.7)    Urine (mL/kg/hr)  200 800 (0.3)     Total Output  200 800     Net  -200 -320 +222            Urine Unmeasured Occurrence   2 x             Physical Exam:     General Appearance:    Alert, cooperative, in no acute distress   Lungs:     Clear to auscultation, respirations regular, even and                  unlabored    Heart:    Regular rhythm and normal rate, normal S1 and S2, no            murmur, no gallop, no rub, no click   Chest Wall:    No abnormalities observed   Abdomen:   Soft nontender nondistended.    Left axilla 2 open areas, draining serous material, the cellulitis is greatly improved, she has much better range of motion, the white count is reduced from 9-6.7 electrolytes within normal limits.        Results Review:     I reviewed the patient's new clinical results.  I reviewed the patient's new imaging results and agree  with the interpretation.    Results from last 7 days   Lab Units 07/29/23  0349 07/28/23  0440 07/27/23  2219   WBC 10*3/mm3 6.72 7.57 9.91   HEMOGLOBIN g/dL 9.4* 9.4* 10.4*   HEMATOCRIT % 31.3* 30.6* 32.9*   PLATELETS 10*3/mm3 205 191 220        Results from last 7 days   Lab Units 07/29/23  0348 07/28/23  0440 07/27/23  2219   SODIUM mmol/L 139 138 137   POTASSIUM mmol/L 3.9 4.0 4.3   CHLORIDE mmol/L 107 106 105   CO2 mmol/L 22.0 25.0 20.0*   BUN mg/dL 9 6 8   CREATININE mg/dL 0.58 0.70 0.72   CALCIUM mg/dL 8.7 8.5* 8.8   BILIRUBIN mg/dL  --   --  0.5   ALK PHOS U/L  --   --  61   ALT (SGPT) U/L  --   --  29   AST (SGOT) U/L  --   --  24   GLUCOSE mg/dL 104* 92 103*       Assessment/Plan     Assessment & Plan       Cellulitis left axilla    Left axillary abscess    Class 3 severe obesity with body mass index (BMI) of 40.0 to 44.9 in adult    Anxiety and depression      Status post incision and drainage of a left axillary abscess, probable MRSA, she will continue Bactrim, and has been discharged from the hospitalist service she will follow-up with her regular physician.    [] X-Ray  [] Reviewed Records  [] Called Physician/Consulted    Cabrera Enriquez MD  07/29/23  10:47 CDT      Time: time spent with patient 15 minutes     Part of this note may be an electronic transcription/translation of spoken language to printed text using the Dragon Dictation System.

## 2023-07-29 NOTE — PLAN OF CARE
Goal Outcome Evaluation:  Plan of Care Reviewed With: patient      Patient c/o pain in left axilla, PRN pain med given. No c/o nausea. Pt on room air, continuous pulse ox in use. A&O x4. Left arm kept elevated per nursing communication. Left axilla dressing changed per nursing communication. IVF and IV abx given. SCDs on. VSS.

## 2023-07-29 NOTE — DISCHARGE SUMMARY
[facility] Medicine Services  DISCHARGE SUMMARY     Date of Admission: 7/27/2023  Date of Discharge:  7/29/2023  Primary Care Physician: Lela Daugherty APRN    Presenting Problem/History of Present Illness:  Left axilla pain and swelling    Final Discharge Diagnoses:  Active Hospital Problems    Diagnosis     **Cellulitis left axilla     Left axillary abscess     Class 3 severe obesity with body mass index (BMI) of 40.0 to 44.9 in adult     Anxiety and depression      Consults: Dr. Enriquez, general surgery    Procedures Performed: None    Pertinent Test Results:     Imaging Results (All)       Procedure Component Value Units Date/Time    US Axilla Left [[id number]] Collected: 07/28/23 1031     Updated: 07/28/23 1035    Narrative:      HISTORY: Left axillary inflammation     Left axilla ultrasound: Sonographic imaging left axilla was performed  beneath the site of the incision.     There is regional edema and inflammatory change. No discrete drainable  abscess collection at this time.       Impression:      1. No drainable abscess collection identified within the left axilla.  Diffuse edema and inflammation.  This report was finalized on 07/28/2023 10:32 by Dr. Ana María Nelson MD.          LAB RESULTS:      Lab 07/29/23  0349 07/28/23 0440 07/27/23 2219   WBC 6.72 7.57 9.91   HEMOGLOBIN 9.4* 9.4* 10.4*   HEMATOCRIT 31.3* 30.6* 32.9*   PLATELETS 205 191 220   NEUTROS ABS 4.02  --  7.67*   IMMATURE GRANS (ABS) 0.03  --  0.05   LYMPHS ABS 1.82  --  1.30   MONOS ABS 0.54  --  0.73   EOS ABS 0.28  --  0.14   MCV 91.5 90.8 88.9   SED RATE  --   --  26*   PROCALCITONIN  --   --  0.05   LACTATE  --   --  0.7         Lab 07/29/23  0348 07/28/23 0440 07/27/23 2219   SODIUM 139 138 137   POTASSIUM 3.9 4.0 4.3   CHLORIDE 107 106 105   CO2 22.0 25.0 20.0*   ANION GAP 10.0 7.0 12.0   BUN 9 6 8   CREATININE 0.58 0.70 0.72   EGFR 124.3 118.7 114.8   GLUCOSE 104* 92 103*   CALCIUM 8.7  8.5* 8.8         Lab 07/27/23 2219   TOTAL PROTEIN 6.4   ALBUMIN 3.9   GLOBULIN 2.5   ALT (SGPT) 29   AST (SGOT) 24   BILIRUBIN 0.5   ALK PHOS 61                     Brief Urine Lab Results       None          Microbiology Results (last 10 days)       Procedure Component Value - Date/Time    Blood Culture - Blood, Arm, Right [[id number]]  (Normal) Collected: 07/27/23 2219    Lab Status: Preliminary result Specimen: Blood from Arm, Right Updated: 07/28/23 2246     Blood Culture No growth at 24 hours    Blood Culture - Blood, Hand, Right [[id number]]  (Normal) Collected: 07/27/23 2219    Lab Status: Preliminary result Specimen: Blood from Hand, Right Updated: 07/28/23 2245     Blood Culture No growth at 24 hours          Hospital Course: Ms. Valle presented to [facility] ER on 7/27/2023 with left axilla pain.  Patient noted small knot with left axilla pain 3 days prior to admission.   noted that lesion was size of nickel and quickly increased to golf ball size.  Patient saw her primary care provider on 7/26 and incision and drainage performed in office.  Patient received Rocephin and Toradol injection and was started on oral clindamycin.  Wound was packed.  She returned to primary care office on 7/27 for additional Rocephin injection.  Patient reported pain continued to worsen and area was not improving.  She had fever of 100.7 upon arrival to ER.  Patient reported chills at home but had not been taking her temperature.  She reported she tried a new deodorant prior to onset of symptoms but unable to recall any injuries.  She does shave her armpits regularly.  WBC 9.9, sed rate 26.  Clindamycin and Dilaudid given in ER.     She was admitted with cellulitis left axilla. Patient had small knot left axilla 3 days prior to admission nickel in size progressed to golf ball size. Primary care provider performed I&D on 7/26. Patient received Rocephin and started on oral clindamycin. Wound packed. Patient  "returned to PCP on 7/27 for additional Rocephin injection. Patient reported fever at home with no improvement. Clindamycin given in ER. Rocephin and vancomycin continued.     Ultrasound left axilla 7/28 reported no drainable abscess collection identified within the left axilla.  General surgery consulted and Dr. Enriquez noted 2 areas open and noted serous type fluid extruding.  No axillary abscess on ultrasound.  Cellulitis continued to improve on vancomycin and Rocephin.  No surgical intervention recommended at this time.  Blood cultures remain no growth.  Recommendations per Dr. Enriquez to pack open wounds with iodoform gauze.  Swelling and erythema continue to improve.  We will continue clindamycin after discharge.  Patient will follow-up with primary care provider this week.     Prozac and trazodone continued for anxiety and depression.    Patient has remained afebrile and white blood cell count normal since admission.    On 7/29/2023, she is stable for discharge.  2 areas previously with I&D left axilla packed with iodoform gauze.  Erythema continues to improve.  Patient will keep left upper extremity elevated.  Continue clindamycin after discharge and follow-up with primary care provider Lela Marrufo this week.    Physical Exam on Discharge:  /56 (BP Location: Right arm, Patient Position: Lying)   Pulse 60   Temp 98 °F (36.7 °C) (Oral)   Resp 16   Ht 175.3 cm (69\")   Wt 128 kg (282 lb 6.4 oz)   LMP 07/13/2023 (Approximate)   SpO2 97%   BMI 41.70 kg/m²   Physical Exam  Vitals and nursing note reviewed.   Constitutional:       Appearance: She is obese.      Comments: Lying in bed.  No oxygen in use.  No visitors in room.   HENT:      Head: Normocephalic and atraumatic.      Nose: No congestion.      Mouth/Throat:      Pharynx: Oropharynx is clear. No oropharyngeal exudate or posterior oropharyngeal erythema.   Eyes:      Extraocular Movements: Extraocular movements intact.      Pupils: Pupils " are equal, round, and reactive to light.   Cardiovascular:      Rate and Rhythm: Normal rate and regular rhythm.   Pulmonary:      Breath sounds: No wheezing, rhonchi or rales.      Comments: No oxygen in use.  Abdominal:      Palpations: Abdomen is soft.      Tenderness: There is no abdominal tenderness.   Genitourinary:     Comments: Voiding.  Musculoskeletal:         General: No swelling or tenderness.      Cervical back: Normal range of motion and neck supple.   Skin:     Comments: Left axilla with 2 small linear incisions from previous outpatient I&D.  Packed with iodoform.  Surrounding erythema and induration decreased over the past 24 hours.  Erythema marked for comparison and significantly decreased.       Neurological:      General: No focal deficit present.      Mental Status: She is alert and oriented to person, place, and time.   Psychiatric:         Mood and Affect: Mood normal.         Behavior: Behavior normal.         Thought Content: Thought content normal.         Judgment: Judgment normal.     Condition on Discharge: Stable for discharge    Discharge Disposition:  Home or Self Care    Discharge Medications:     Discharge Medications        Continue These Medications        Instructions Start Date   clindamycin 300 MG capsule  Commonly known as: CLEOCIN   300 mg, Oral, 3 Times Daily      FLUoxetine 20 MG capsule  Commonly known as: PROzac   20 mg, Oral, Daily      LORazepam 1 MG tablet  Commonly known as: ATIVAN   1 mg, Oral, 2 Times Daily      ondansetron 4 MG tablet  Commonly known as: ZOFRAN   4 mg, Oral, Every 8 Hours PRN      oxyCODONE-acetaminophen 5-325 MG per tablet  Commonly known as: PERCOCET   1 tablet, Oral, Every 6 Hours PRN, Prescribed 3 day supply only      Semaglutide-Weight Management 0.25 MG/0.5ML solution auto-injector   0.5 mL, Subcutaneous, Every 7 Days, Tuesday      traZODone 50 MG tablet  Commonly known as: DESYREL   50 mg, Oral, Nightly             Discharge Diet:   Diet  Instructions       Diet: Regular/House Diet; Regular Texture (IDDSI 7); Thin (IDDSI 0)      Discharge Diet: Regular/House Diet    Texture: Regular Texture (IDDSI 7)    Fluid Consistency: Thin (IDDSI 0)          Activity at Discharge:   Activity Instructions       Activity as Tolerated      Other Activity Instructions      Activity Instructions: Elevate left upper extremity          Discharge instructions:  1.  For worsening pain, erythema, drainage left axilla seek medical attention.  2.  Complete clindamycin after discharge.  3.  Pack open areas left axilla with iodoform wick twice daily  4.  Keep left upper extremity elevated.  5.  Follow-up with Lela MCKEE next week.    Follow-up Appointments:   RYLEE Omer next week    Test Results Pending at Discharge: None    Electronically signed by RYLEE Valdez, 07/29/23, 09:16 CDT.    Time: 35 minutes.  Discussed with Dr. Cho and patient.    The above documentation resulted from a face-to-face encounter by me Dolly MCKEE, [facility].

## 2023-07-29 NOTE — PLAN OF CARE
Problem: Adult Inpatient Plan of Care  Goal: Plan of Care Review  Outcome: Met  Goal: Patient-Specific Goal (Individualized)  Outcome: Met  Goal: Absence of Hospital-Acquired Illness or Injury  Outcome: Met  Intervention: Identify and Manage Fall Risk  Recent Flowsheet Documentation  Taken 7/29/2023 0848 by Carlitos Terrazas RN  Safety Promotion/Fall Prevention: safety round/check completed  Intervention: Prevent Skin Injury  Recent Flowsheet Documentation  Taken 7/29/2023 0848 by Carlitos Terrazas RN  Skin Protection: adhesive use limited  Intervention: Prevent and Manage VTE (Venous Thromboembolism) Risk  Recent Flowsheet Documentation  Taken 7/29/2023 0848 by Carlitos Terrazas RN  VTE Prevention/Management:   sequential compression devices on   bilateral  Range of Motion: active ROM (range of motion) encouraged  Intervention: Prevent Infection  Recent Flowsheet Documentation  Taken 7/29/2023 0848 by Carlitos Terrazas RN  Infection Prevention:   single patient room provided   rest/sleep promoted  Goal: Optimal Comfort and Wellbeing  Outcome: Met  Intervention: Monitor Pain and Promote Comfort  Recent Flowsheet Documentation  Taken 7/29/2023 0848 by Carlitos Terrazas RN  Pain Management Interventions:   care clustered   see MAR   quiet environment facilitated  Intervention: Provide Person-Centered Care  Recent Flowsheet Documentation  Taken 7/29/2023 0848 by Carlitos Terrazas RN  Trust Relationship/Rapport:   care explained   choices provided   emotional support provided   empathic listening provided  Goal: Readiness for Transition of Care  Outcome: Met     Problem: Pain Acute  Goal: Acceptable Pain Control and Functional Ability  Outcome: Met  Intervention: Prevent or Manage Pain  Recent Flowsheet Documentation  Taken 7/29/2023 0848 by Carlitos Terrazas RN  Sensory Stimulation Regulation:   care clustered   lighting decreased  Bowel Elimination Promotion:   adequate fluid intake promoted   ambulation promoted   privacy  promoted  Sleep/Rest Enhancement:   awakenings minimized   consistent schedule promoted  Medication Review/Management: medications reviewed  Intervention: Develop Pain Management Plan  Recent Flowsheet Documentation  Taken 7/29/2023 0848 by Carlitos Terrazas RN  Pain Management Interventions:   care clustered   see MAR   quiet environment facilitated     Problem: Infection  Goal: Absence of Infection Signs and Symptoms  Outcome: Met  Intervention: Prevent or Manage Infection  Recent Flowsheet Documentation  Taken 7/29/2023 0848 by Carlitos Terrazas RN  Infection Management: aseptic technique maintained     Problem: Skin or Soft Tissue Infection  Goal: Absence of Infection Signs and Symptoms  Outcome: Met  Intervention: Minimize and Manage Infection Progression  Recent Flowsheet Documentation  Taken 7/29/2023 0848 by Carlitos Terrazas RN  Infection Management: aseptic technique maintained  Infection Prevention:   single patient room provided   rest/sleep promoted   Goal Outcome Evaluation:

## 2023-07-29 NOTE — PROGRESS NOTES
She looks like she is doing better, her arm is elevated, some serous type drainage is coming out from the 2 open areas now that they are opened with a iodoform wick, she feels better the cellulitis is resolving.  Continue current courses no other changes.

## 2023-07-30 LAB
BACTERIA SPEC ANAEROBE CULT: ABNORMAL
BACTERIA SPEC ANAEROBE+AEROBE CULT: ABNORMAL
GRAM STN SPEC: ABNORMAL
ORGANISM: ABNORMAL

## 2023-08-01 ENCOUNTER — TELEPHONE (OUTPATIENT)
Dept: PRIMARY CARE CLINIC | Age: 33
End: 2023-08-01

## 2023-08-01 LAB
BACTERIA SPEC AEROBE CULT: NORMAL
BACTERIA SPEC AEROBE CULT: NORMAL

## 2023-08-01 NOTE — TELEPHONE ENCOUNTER
Care Transitions Initial Follow Up Call    Outreach made within 2 business days of discharge: Yes    Patient: Ana María Hand Patient : 1990   MRN: [id number]  Reason for Admission: Abscess left axilla, Cellulitis    Discharge Date: 2023       Spoke with: No One answered.      Discharge department/facility: [facility]    Scheduled appointment with PCP within 7-14 days    Follow Up  Future Appointments   Date Time Provider [address]   2023 10:15 AM KIMBERLEY Sharma CNP [facility]   8/3/2023  3:00 PM KIMBERLEY Colbert CNP [facility]   2023  1:45 PM KIMBERLEY Sharma CNP [facility]   10/31/2023  1:00 PM KIMBERLEY Samuels CNM OB/GYN [facility]       Vivian Robertson LPN

## 2023-08-02 ENCOUNTER — OFFICE VISIT (OUTPATIENT)
Dept: PRIMARY CARE CLINIC | Age: 33
End: 2023-08-02

## 2023-08-02 ENCOUNTER — TELEPHONE (OUTPATIENT)
Dept: PSYCHIATRY | Age: 33
End: 2023-08-02

## 2023-08-02 VITALS
HEART RATE: 86 BPM | WEIGHT: 278 LBS | BODY MASS INDEX: 41.18 KG/M2 | SYSTOLIC BLOOD PRESSURE: 123 MMHG | RESPIRATION RATE: 16 BRPM | TEMPERATURE: 97.6 F | OXYGEN SATURATION: 98 % | HEIGHT: 69 IN | DIASTOLIC BLOOD PRESSURE: 73 MMHG

## 2023-08-02 DIAGNOSIS — Z09 HOSPITAL DISCHARGE FOLLOW-UP: ICD-10-CM

## 2023-08-02 DIAGNOSIS — L02.412 ABSCESS OF LEFT AXILLA: Primary | ICD-10-CM

## 2023-08-02 NOTE — TELEPHONE ENCOUNTER
Called pt for appointment reminder.   -left voicemail, requesting a return call    Electronically signed by Jayne Davis MA on 8/2/2023 at 4:03 PM

## 2023-08-02 NOTE — PROGRESS NOTES
Post-Discharge Transitional Care Follow Up    Colleen Hightower   YOB: 1990    Date of Office Visit:  8/2/2023  Date of Hospital Admission: 7-27-23  Date of Hospital Discharge: 7-29-23    Care management risk score Rising risk (score 2-5) and Complex Care (Scores >=6): No Risk Score On File     Non face to face  following discharge, date last encounter closed (first attempt may have been earlier): 08/01/2023     Call initiated 2 business days of discharge: Yes    ASSESSMENT/PLAN:   Below is the assessment and plan developed based on review of pertinent history, physical exam, labs, studies, and medications. Abscess of left axilla  Hospital discharge follow-up  -     ND DISCHARGE MEDS RECONCILED W/ CURRENT OUTPATIENT MED LIST      Medical Decision Making: moderate complexity  No follow-ups on file. On this date 8/2/2023 I have spent 30 minutes reviewing previous notes, test results and face to face with the patient discussing the diagnosis and importance of compliance with the treatment plan as well as documenting on the day of the visit. Her axilla looks so much better today advised her she can pull the packing which she can barely get in the incisions anymore anyway and leave it out. She should shower twice a day and finish her clindamycin. She can use the Bactroban to prevent MRSA in the future 5 nights a month using days 1 through 5 of each month would be easy to remember it is just a preventative. If she gets any redness or worsening she is to notify me immediately. If she has any signs of yeast she is to notify me if she gets diarrhea she is to notify me    Subjective:   HPI I seen her in the office last week on the 26th and 27th I had I indeed her left axilla and got very little from it. We did obtain a culture that grew MRSA. She had returned on the second day for a Rocephin repeat injection with very little improvement.   On day 2 she went to the ER that night was admitted see the

## 2023-08-03 ENCOUNTER — TELEPHONE (OUTPATIENT)
Dept: PSYCHIATRY | Age: 33
End: 2023-08-03

## 2023-08-03 NOTE — TELEPHONE ENCOUNTER
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 Rescheduled for 08/08/23 @ 3.     Electronically signed by Juliet Wooten MA on 8/3/2023 at 12:04 PM

## 2023-08-07 ENCOUNTER — TELEPHONE (OUTPATIENT)
Dept: PSYCHIATRY | Age: 33
End: 2023-08-07

## 2023-08-07 NOTE — TELEPHONE ENCOUNTER
Called and confirmed appt with pt for 8/8 @ 3 PM    Electronically signed by Baldo Oates on 8/7/2023 at 3:12 PM

## 2023-08-08 ENCOUNTER — OFFICE VISIT (OUTPATIENT)
Dept: PSYCHIATRY | Age: 33
End: 2023-08-08

## 2023-08-08 VITALS
HEIGHT: 69 IN | TEMPERATURE: 99.2 F | OXYGEN SATURATION: 98 % | HEART RATE: 81 BPM | DIASTOLIC BLOOD PRESSURE: 92 MMHG | BODY MASS INDEX: 40.94 KG/M2 | SYSTOLIC BLOOD PRESSURE: 142 MMHG | WEIGHT: 276.4 LBS

## 2023-08-08 DIAGNOSIS — F33.1 MODERATE EPISODE OF RECURRENT MAJOR DEPRESSIVE DISORDER (HCC): Primary | ICD-10-CM

## 2023-08-08 RX ORDER — TRAZODONE HYDROCHLORIDE 100 MG/1
100 TABLET ORAL NIGHTLY PRN
Qty: 30 TABLET | Refills: 2 | Status: SHIPPED | OUTPATIENT
Start: 2023-08-08

## 2023-08-08 ASSESSMENT — PATIENT HEALTH QUESTIONNAIRE - PHQ9
SUM OF ALL RESPONSES TO PHQ QUESTIONS 1-9: 12
SUM OF ALL RESPONSES TO PHQ QUESTIONS 1-9: 12
3. TROUBLE FALLING OR STAYING ASLEEP: 2
10. IF YOU CHECKED OFF ANY PROBLEMS, HOW DIFFICULT HAVE THESE PROBLEMS MADE IT FOR YOU TO DO YOUR WORK, TAKE CARE OF THINGS AT HOME, OR GET ALONG WITH OTHER PEOPLE: 1
5. POOR APPETITE OR OVEREATING: 2
SUM OF ALL RESPONSES TO PHQ QUESTIONS 1-9: 12
SUM OF ALL RESPONSES TO PHQ9 QUESTIONS 1 & 2: 2
4. FEELING TIRED OR HAVING LITTLE ENERGY: 2
6. FEELING BAD ABOUT YOURSELF - OR THAT YOU ARE A FAILURE OR HAVE LET YOURSELF OR YOUR FAMILY DOWN: 2
1. LITTLE INTEREST OR PLEASURE IN DOING THINGS: 1
7. TROUBLE CONCENTRATING ON THINGS, SUCH AS READING THE NEWSPAPER OR WATCHING TELEVISION: 2
9. THOUGHTS THAT YOU WOULD BE BETTER OFF DEAD, OR OF HURTING YOURSELF: 0
2. FEELING DOWN, DEPRESSED OR HOPELESS: 1
SUM OF ALL RESPONSES TO PHQ QUESTIONS 1-9: 12
8. MOVING OR SPEAKING SO SLOWLY THAT OTHER PEOPLE COULD HAVE NOTICED. OR THE OPPOSITE, BEING SO FIGETY OR RESTLESS THAT YOU HAVE BEEN MOVING AROUND A LOT MORE THAN USUAL: 0

## 2023-08-08 ASSESSMENT — COLUMBIA-SUICIDE SEVERITY RATING SCALE - C-SSRS
1. WITHIN THE PAST MONTH, HAVE YOU WISHED YOU WERE DEAD OR WISHED YOU COULD GO TO SLEEP AND NOT WAKE UP?: NO
6. HAVE YOU EVER DONE ANYTHING, STARTED TO DO ANYTHING, OR PREPARED TO DO ANYTHING TO END YOUR LIFE?: NO
2. HAVE YOU ACTUALLY HAD ANY THOUGHTS OF KILLING YOURSELF?: NO

## 2023-08-08 NOTE — PROGRESS NOTES
PSYCHIATRIC EVALUATION    Date of Service:  8/8/23     Chief Complaint   Patient presents with    New Patient       HISTORY OF PRESENT ILLNESS  The patient is a 29 y.o.   female who is here for psychiatric evaluation due to continual complaints of depression and anxiety. Evaluate for bipolar disorder    Today: she had previously been seen in this office in 2021, had an intake appointment and decidied not to come back. She states she has taken care of her family for the past couple of years and has decided it is now her time to focus on caring for herself. In between visits she had stopped taking her medications. She was off of medications for about a year. She was sometimes better and sometimes worse. She followed up with PCP and was restarted on prozac and trazodone. She has only been on these medications for about 1 month. She was also started on ativan 1mg twice daily. She says overall she is feeling ok. She requested to restart appointments with this office. We reviewed medication and discussed making ativan more specifically for extreme anxiety and agitation rather than taking scheduled. Additionally we discussed increasing trazodone to 100 mg nightly for sleep. We also discussed beginning Vraylar 1.5 mg daily for adjunct therapy for major depressive disorder as well as mood stabilization. Patient has previously been on Abilify with minimal results and Seroquel is contraindicated at this time due to weight issues. We will follow-up in 1 month      She is currently not in therapy but is open to starting therapy. Will make a referral to therapy in this office. Sleep: not sleeping very well, difficulty initiating sleep. Reports having racing and ruminating thoughts. Pt denies excessive spending,  endorses mood swings, irritability, manic or hypomanic episodes. Pt denies SI, HI, Auditory, visual, and tactile hallucinations at this time.     Appetite: decreased    Caffeine use:

## 2023-08-10 RX ORDER — SEMAGLUTIDE 0.5 MG/.5ML
0.5 INJECTION, SOLUTION SUBCUTANEOUS
Qty: 3 ML | Refills: 2 | Status: SHIPPED | OUTPATIENT
Start: 2023-08-10

## 2023-08-10 NOTE — TELEPHONE ENCOUNTER
From: CRUZ Rodriguez  To: Sumit Deweyivner  Sent: 7/18/2023 4:12 PM CDT  Subject: ZNPLTH    HEY HEY!!   Prior authorization approved Case ID: Wei Atkinson      Payer: [employer] - Commercial [phone]   Your PA request has been approved. Additional information will be provided in the approval communication. (Message [id number])   Approval Details    Authorized from July 18, 2023 to February 13, 2024          You may need to have your pharmacy run this back through. If you have trouble getting this, because there has been a shortage, just let us know. Weve had some patients say that [facility] has this in stock. We could send it to [facility].

## 2023-08-11 ENCOUNTER — TELEPHONE (OUTPATIENT)
Dept: PSYCHIATRY | Age: 33
End: 2023-08-11

## 2023-08-11 NOTE — TELEPHONE ENCOUNTER
Pt return phone call to schedule an appt from a referral for therapy    Appt was schedule with Blessing Chang for 9/19 @ 3 PM . Pt was also put on the cancellation list.    Electronically signed by Amna Monson on 8/11/2023 at 3:38 PM

## 2023-08-11 NOTE — TELEPHONE ENCOUNTER
Let pt know that we are waiting on the decision from the PA for [address]    MA offer samples to pt while we wait on the decision. Pt stated tht she pick it up on 8/14.     Electronically signed by Adriana Allison on 8/11/2023 at 3:48 PM

## 2023-08-11 NOTE — TELEPHONE ENCOUNTER
Called pt to schedule an appt from a referral    No answer and LVM.     Electronically signed by Conner Fontaine MA on 8/11/2023 at 12:40 PM

## 2023-08-22 ENCOUNTER — TELEPHONE (OUTPATIENT)
Dept: PSYCHIATRY | Age: 33
End: 2023-08-22

## 2023-08-22 NOTE — TELEPHONE ENCOUNTER
PA was approved for Vraylar 1.5 MG    Called and let pharmacy know that it was approved     Called and lvm letting pt know that it was approved,    Electronically signed by Ashvin Zuniga on 8/22/2023 at 10:55 AM

## 2023-08-31 DIAGNOSIS — F41.9 ANXIETY: Primary | ICD-10-CM

## 2023-08-31 RX ORDER — LORAZEPAM 1 MG/1
1 TABLET ORAL 2 TIMES DAILY PRN
COMMUNITY
End: 2023-08-31 | Stop reason: SDUPTHER

## 2023-08-31 RX ORDER — LORAZEPAM 1 MG/1
1 TABLET ORAL 2 TIMES DAILY PRN
Qty: 60 TABLET | Refills: 0 | Status: SHIPPED | OUTPATIENT
Start: 2023-08-31 | End: 2023-09-30

## 2023-08-31 NOTE — TELEPHONE ENCOUNTER
PDMP Monitoring:    Last PDMP Satinder Chase as Reviewed [facility]):  Review User Review Instant Review Result   Slime Javier 7/18/2023 12:08 PM Reviewed PDMP [1]     Urine Drug Screenings (1 yr)       Urine Drug Screen  Collected: 5/3/2020  8:10 PM (Final result)              Urine Drug Screen  Collected: 10/29/2018  9:30 PM (Final result)                  Medication Contract and Consent for Opioid Use Documents Filed       Patient Documents       Type of Document Status Date Received Received By Description    Medication Contract Received 7/18/2023 12:14 PM Ifrah Chandra Medication Contract    Medication Contract Received 8/8/2023  3:03 PM Dawit Salamanca

## 2023-09-05 ENCOUNTER — TELEPHONE (OUTPATIENT)
Dept: PSYCHIATRY | Age: 33
End: 2023-09-05

## 2023-09-05 NOTE — TELEPHONE ENCOUNTER
Called pt for appointment reminder.   -left voicemail, requesting a return call    Electronically signed by Suzanna Mora MA on 9/5/2023 at 3:17 PM

## 2023-09-06 ENCOUNTER — OFFICE VISIT (OUTPATIENT)
Dept: PSYCHIATRY | Age: 33
End: 2023-09-06
Payer: COMMERCIAL

## 2023-09-06 VITALS
TEMPERATURE: 97 F | SYSTOLIC BLOOD PRESSURE: 137 MMHG | HEART RATE: 80 BPM | DIASTOLIC BLOOD PRESSURE: 79 MMHG | HEIGHT: 69 IN | OXYGEN SATURATION: 98 % | WEIGHT: 284 LBS | BODY MASS INDEX: 42.06 KG/M2

## 2023-09-06 DIAGNOSIS — F33.0 MILD EPISODE OF RECURRENT MAJOR DEPRESSIVE DISORDER (HCC): Primary | ICD-10-CM

## 2023-09-06 DIAGNOSIS — F41.1 GAD (GENERALIZED ANXIETY DISORDER): ICD-10-CM

## 2023-09-06 PROCEDURE — 3078F DIAST BP <80 MM HG: CPT | Performed by: NURSE PRACTITIONER

## 2023-09-06 PROCEDURE — 99214 OFFICE O/P EST MOD 30 MIN: CPT | Performed by: NURSE PRACTITIONER

## 2023-09-06 PROCEDURE — 3075F SYST BP GE 130 - 139MM HG: CPT | Performed by: NURSE PRACTITIONER

## 2023-09-06 ASSESSMENT — PATIENT HEALTH QUESTIONNAIRE - PHQ9
4. FEELING TIRED OR HAVING LITTLE ENERGY: 0
6. FEELING BAD ABOUT YOURSELF - OR THAT YOU ARE A FAILURE OR HAVE LET YOURSELF OR YOUR FAMILY DOWN: 0
2. FEELING DOWN, DEPRESSED OR HOPELESS: 0
9. THOUGHTS THAT YOU WOULD BE BETTER OFF DEAD, OR OF HURTING YOURSELF: 0
SUM OF ALL RESPONSES TO PHQ QUESTIONS 1-9: 2
5. POOR APPETITE OR OVEREATING: 0
SUM OF ALL RESPONSES TO PHQ QUESTIONS 1-9: 2
SUM OF ALL RESPONSES TO PHQ9 QUESTIONS 1 & 2: 0
1. LITTLE INTEREST OR PLEASURE IN DOING THINGS: 0
8. MOVING OR SPEAKING SO SLOWLY THAT OTHER PEOPLE COULD HAVE NOTICED. OR THE OPPOSITE, BEING SO FIGETY OR RESTLESS THAT YOU HAVE BEEN MOVING AROUND A LOT MORE THAN USUAL: 0
3. TROUBLE FALLING OR STAYING ASLEEP: 1
10. IF YOU CHECKED OFF ANY PROBLEMS, HOW DIFFICULT HAVE THESE PROBLEMS MADE IT FOR YOU TO DO YOUR WORK, TAKE CARE OF THINGS AT HOME, OR GET ALONG WITH OTHER PEOPLE: 0
SUM OF ALL RESPONSES TO PHQ QUESTIONS 1-9: 2
SUM OF ALL RESPONSES TO PHQ QUESTIONS 1-9: 2
7. TROUBLE CONCENTRATING ON THINGS, SUCH AS READING THE NEWSPAPER OR WATCHING TELEVISION: 1

## 2023-09-06 NOTE — PROGRESS NOTES
9/6/23        Progress Note    Varun Remy Katja 1990      Chief Complaint   Patient presents with    Medication Check    Follow-up         Subjective:    Patient is a 29 y.o. female diagnosed with MDD DORCAS and presents today for follow-up. Last seen in clinic on 8/8/2023  and prior records were reviewed. Last visit: she had previously been seen in this office in 2021, had an intake appointment and decidied not to come back. She states she has taken care of her family for the past couple of years and has decided it is now her time to focus on caring for herself. In between visits she had stopped taking her medications. She was off of medications for about a year. She was sometimes better and sometimes worse. She followed up with PCP and was restarted on prozac and trazodone. She has only been on these medications for about 1 month. She was also started on ativan 1mg twice daily. She says overall she is feeling ok. She requested to restart appointments with this office. We reviewed medication and discussed making ativan more specifically for extreme anxiety and agitation rather than taking scheduled. Additionally we discussed increasing trazodone to 100 mg nightly for sleep. We also discussed beginning Vraylar 1.5 mg daily for adjunct therapy for major depressive disorder as well as mood stabilization. Patient has previously been on Abilify with minimal results and Seroquel is contraindicated at this time due to weight issues. We will follow-up in 1 month    Today : over the past month she has noticed a benefit. She has been sleeping better, decreased mood swings and work life has been good. She says she has not gotten many comments on doing better but she feels like she is doing well. Absent  suicidal ideation. Reports compliance with medications as good . Sleep: better. Getting about 6 hours of sleep nightly.     Appetite: decreased     Caffeine use: recently cut back on

## 2023-09-11 ENCOUNTER — TELEPHONE (OUTPATIENT)
Dept: PRIMARY CARE CLINIC | Age: 33
End: 2023-09-11

## 2023-09-11 RX ORDER — CLINDAMYCIN HYDROCHLORIDE 300 MG/1
300 CAPSULE ORAL 3 TIMES DAILY
Qty: 30 CAPSULE | Refills: 0 | Status: SHIPPED | OUTPATIENT
Start: 2023-09-11 | End: 2023-09-21

## 2023-09-12 ENCOUNTER — OFFICE VISIT (OUTPATIENT)
Dept: PRIMARY CARE CLINIC | Age: 33
End: 2023-09-12
Payer: COMMERCIAL

## 2023-09-12 VITALS
SYSTOLIC BLOOD PRESSURE: 120 MMHG | OXYGEN SATURATION: 99 % | HEIGHT: 69 IN | WEIGHT: 285 LBS | HEART RATE: 82 BPM | DIASTOLIC BLOOD PRESSURE: 80 MMHG | TEMPERATURE: 97.8 F | RESPIRATION RATE: 16 BRPM | BODY MASS INDEX: 42.21 KG/M2

## 2023-09-12 DIAGNOSIS — L02.419 AXILLARY ABSCESS: Primary | ICD-10-CM

## 2023-09-12 PROCEDURE — 3079F DIAST BP 80-89 MM HG: CPT | Performed by: NURSE PRACTITIONER

## 2023-09-12 PROCEDURE — 99213 OFFICE O/P EST LOW 20 MIN: CPT | Performed by: NURSE PRACTITIONER

## 2023-09-12 PROCEDURE — 3074F SYST BP LT 130 MM HG: CPT | Performed by: NURSE PRACTITIONER

## 2023-09-12 RX ORDER — SEMAGLUTIDE 0.5 MG/.5ML
0.5 INJECTION, SOLUTION SUBCUTANEOUS
Qty: 3 ML | Refills: 2 | Status: SHIPPED | OUTPATIENT
Start: 2023-09-12

## 2023-09-12 ASSESSMENT — ENCOUNTER SYMPTOMS
GASTROINTESTINAL NEGATIVE: 1
EYES NEGATIVE: 1
RESPIRATORY NEGATIVE: 1

## 2023-09-12 NOTE — PROGRESS NOTES
signs of potential drug abuse or diversion identified. filed at 07/18/2023 1208   Chronic Pain > 80 MEDD Obtained or confirmed \"Medication Contract\" on file. filed at 07/18/2023 1208          Urine Drug Screenings (1 yr)       Urine Drug Screen  Collected: 5/3/2020  8:10 PM (Final result)              Urine Drug Screen  Collected: 10/29/2018  9:30 PM (Final result)                  Medication Contract and Consent for Opioid Use Documents Filed       Patient Documents       Type of Document Status Date Received Received By Description    Medication Contract Received 7/18/2023 12:14 PM Siria Hopper Medication Contract    Medication Contract Received 8/8/2023  3:03 PM Seeha Alert                      Patient given educational materials -see patient instructions. Discussed use, benefit, and side effects of prescribed medications. All patient questions answered. Pt voiced understanding. Reviewed health maintenance. Instructed to continue currentmedications, diet and exercise. Patient agreed with treatment plan. Follow up as directed. MEDICATIONS:  Orders Placed This Encounter   Medications    Semaglutide-Weight Management (WEGOVY) 0.5 MG/0.5ML SOAJ SC injection     Sig: Inject 0.5 mg into the skin every 7 days     Dispense:  3 mL     Refill:  2         ORDERS:  No orders of the defined types were placed in this encounter. Follow-up:  Return if symptoms worsen or fail to improve. PATIENT INSTRUCTIONS:  Patient Instructions   Finish antibiotic  Keep warm soaks on and if any redness moves out come in     Electronically signed by KIMBERLEY Loya CNP on 9/12/2023 at 4:34 PM    EMR Dragon/transcription disclaimer:  Much of thisencounter note is electronic transcription/translation of spoken language to printed texts. The electronic translation of spoken language may be erroneous, or at times, nonsensical words or phrases may be inadvertentlytranscribed.   Although I have reviewed the note for such

## 2023-09-18 ENCOUNTER — TELEPHONE (OUTPATIENT)
Dept: PSYCHIATRY | Age: 33
End: 2023-09-18

## 2023-09-18 NOTE — TELEPHONE ENCOUNTER
Called pt for appointment reminder.   -left voicemail, requesting a return call    Electronically signed by Sandi Riley MA on 9/18/2023 at 1:43 PM

## 2023-09-19 ENCOUNTER — OFFICE VISIT (OUTPATIENT)
Dept: PSYCHIATRY | Age: 33
End: 2023-09-19

## 2023-09-19 DIAGNOSIS — F33.0 MILD EPISODE OF RECURRENT MAJOR DEPRESSIVE DISORDER (HCC): Primary | ICD-10-CM

## 2023-09-19 DIAGNOSIS — F41.9 ANXIETY DISORDER, UNSPECIFIED TYPE: ICD-10-CM

## 2023-09-19 DIAGNOSIS — F43.21 GRIEF: ICD-10-CM

## 2023-09-19 ASSESSMENT — PATIENT HEALTH QUESTIONNAIRE - PHQ9
9. THOUGHTS THAT YOU WOULD BE BETTER OFF DEAD, OR OF HURTING YOURSELF: 0
1. LITTLE INTEREST OR PLEASURE IN DOING THINGS: 1
SUM OF ALL RESPONSES TO PHQ QUESTIONS 1-9: 8
5. POOR APPETITE OR OVEREATING: 1
6. FEELING BAD ABOUT YOURSELF - OR THAT YOU ARE A FAILURE OR HAVE LET YOURSELF OR YOUR FAMILY DOWN: 0
SUM OF ALL RESPONSES TO PHQ QUESTIONS 1-9: 8
10. IF YOU CHECKED OFF ANY PROBLEMS, HOW DIFFICULT HAVE THESE PROBLEMS MADE IT FOR YOU TO DO YOUR WORK, TAKE CARE OF THINGS AT HOME, OR GET ALONG WITH OTHER PEOPLE: 1
2. FEELING DOWN, DEPRESSED OR HOPELESS: 2
SUM OF ALL RESPONSES TO PHQ QUESTIONS 1-9: 8
7. TROUBLE CONCENTRATING ON THINGS, SUCH AS READING THE NEWSPAPER OR WATCHING TELEVISION: 1
8. MOVING OR SPEAKING SO SLOWLY THAT OTHER PEOPLE COULD HAVE NOTICED. OR THE OPPOSITE, BEING SO FIGETY OR RESTLESS THAT YOU HAVE BEEN MOVING AROUND A LOT MORE THAN USUAL: 0
SUM OF ALL RESPONSES TO PHQ QUESTIONS 1-9: 8
SUM OF ALL RESPONSES TO PHQ9 QUESTIONS 1 & 2: 3
3. TROUBLE FALLING OR STAYING ASLEEP: 1
4. FEELING TIRED OR HAVING LITTLE ENERGY: 2

## 2023-09-19 ASSESSMENT — ANXIETY QUESTIONNAIRES
IF YOU CHECKED OFF ANY PROBLEMS ON THIS QUESTIONNAIRE, HOW DIFFICULT HAVE THESE PROBLEMS MADE IT FOR YOU TO DO YOUR WORK, TAKE CARE OF THINGS AT HOME, OR GET ALONG WITH OTHER PEOPLE: SOMEWHAT DIFFICULT
5. BEING SO RESTLESS THAT IT IS HARD TO SIT STILL: 0
4. TROUBLE RELAXING: 2
6. BECOMING EASILY ANNOYED OR IRRITABLE: 1
7. FEELING AFRAID AS IF SOMETHING AWFUL MIGHT HAPPEN: 0
2. NOT BEING ABLE TO STOP OR CONTROL WORRYING: 2
GAD7 TOTAL SCORE: 9
3. WORRYING TOO MUCH ABOUT DIFFERENT THINGS: 2
1. FEELING NERVOUS, ANXIOUS, OR ON EDGE: 2

## 2023-09-19 NOTE — PROGRESS NOTES
veins of bilateral lower extremities with other complications         Social History     Socioeconomic History    Marital status:      Spouse name: Not on file    Number of children: Not on file    Years of education: Not on file    Highest education level: Not on file   Occupational History    Not on file   Tobacco Use    Smoking status: Former     Packs/day: 0.50     Years: 3.00     Additional pack years: 0.00     Total pack years: 1.50     Types: Cigarettes    Smokeless tobacco: Never   Vaping Use    Vaping Use: Never used   Substance and Sexual Activity    Alcohol use: No    Drug use: No    Sexual activity: Yes     Partners: Male   Other Topics Concern    Not on file   Social History Narrative    Not on file     Social Determinants of Health     Financial Resource Strain: Low Risk  (7/18/2023)    Overall Financial Resource Strain (CARDIA)     Difficulty of Paying Living Expenses: Not hard at all   Food Insecurity: No Food Insecurity (7/18/2023)    Hunger Vital Sign     Worried About Running Out of Food in the Last Year: Never true     Ran Out of Food in the Last Year: Never true   Transportation Needs: Unknown (7/18/2023)    PRAPARE - Transportation     Lack of Transportation (Medical): Not on file     Lack of Transportation (Non-Medical): No   Physical Activity: Not on file   Stress: Not on file   Social Connections: Not on file   Intimate Partner Violence: Not on file   Housing Stability: Unknown (7/18/2023)    Housing Stability Vital Sign     Unable to Pay for Housing in the Last Year: Not on file     Number of Places Lived in the Last Year: Not on file     Unstable Housing in the Last Year: No       Psychiatric Review Of Systems:     Sleep (specify as to how it has changed): yes, pt reports having trouble staying asleep. Appetite changes (specify): pt reports she has been on a weight loss journey and has lost 30 something pounds but since her grandmother passed away she has been overeating.    Weight

## 2023-10-18 ENCOUNTER — OFFICE VISIT (OUTPATIENT)
Dept: PSYCHIATRY | Age: 33
End: 2023-10-18
Payer: COMMERCIAL

## 2023-10-18 VITALS
TEMPERATURE: 96.8 F | DIASTOLIC BLOOD PRESSURE: 81 MMHG | HEART RATE: 88 BPM | SYSTOLIC BLOOD PRESSURE: 130 MMHG | RESPIRATION RATE: 16 BRPM | BODY MASS INDEX: 42.38 KG/M2 | OXYGEN SATURATION: 98 % | WEIGHT: 287 LBS

## 2023-10-18 DIAGNOSIS — F33.0 MILD EPISODE OF RECURRENT MAJOR DEPRESSIVE DISORDER (HCC): Primary | ICD-10-CM

## 2023-10-18 DIAGNOSIS — F41.1 GAD (GENERALIZED ANXIETY DISORDER): ICD-10-CM

## 2023-10-18 DIAGNOSIS — F43.21 GRIEF: ICD-10-CM

## 2023-10-18 PROCEDURE — 3075F SYST BP GE 130 - 139MM HG: CPT | Performed by: NURSE PRACTITIONER

## 2023-10-18 PROCEDURE — 3079F DIAST BP 80-89 MM HG: CPT | Performed by: NURSE PRACTITIONER

## 2023-10-18 PROCEDURE — 99214 OFFICE O/P EST MOD 30 MIN: CPT | Performed by: NURSE PRACTITIONER

## 2023-10-18 RX ORDER — SEMAGLUTIDE 0.5 MG/.5ML
0.5 INJECTION, SOLUTION SUBCUTANEOUS
Qty: 3 ML | Refills: 2 | Status: SHIPPED | OUTPATIENT
Start: 2023-10-18

## 2023-10-18 ASSESSMENT — PATIENT HEALTH QUESTIONNAIRE - PHQ9
3. TROUBLE FALLING OR STAYING ASLEEP: 0
2. FEELING DOWN, DEPRESSED OR HOPELESS: 1
4. FEELING TIRED OR HAVING LITTLE ENERGY: 2
9. THOUGHTS THAT YOU WOULD BE BETTER OFF DEAD, OR OF HURTING YOURSELF: 0
SUM OF ALL RESPONSES TO PHQ QUESTIONS 1-9: 8
5. POOR APPETITE OR OVEREATING: 2
SUM OF ALL RESPONSES TO PHQ QUESTIONS 1-9: 8
6. FEELING BAD ABOUT YOURSELF - OR THAT YOU ARE A FAILURE OR HAVE LET YOURSELF OR YOUR FAMILY DOWN: 0
8. MOVING OR SPEAKING SO SLOWLY THAT OTHER PEOPLE COULD HAVE NOTICED. OR THE OPPOSITE, BEING SO FIGETY OR RESTLESS THAT YOU HAVE BEEN MOVING AROUND A LOT MORE THAN USUAL: 2
10. IF YOU CHECKED OFF ANY PROBLEMS, HOW DIFFICULT HAVE THESE PROBLEMS MADE IT FOR YOU TO DO YOUR WORK, TAKE CARE OF THINGS AT HOME, OR GET ALONG WITH OTHER PEOPLE: 0
7. TROUBLE CONCENTRATING ON THINGS, SUCH AS READING THE NEWSPAPER OR WATCHING TELEVISION: 1

## 2023-10-18 ASSESSMENT — COLUMBIA-SUICIDE SEVERITY RATING SCALE - C-SSRS
3. HAVE YOU BEEN THINKING ABOUT HOW YOU MIGHT KILL YOURSELF?: NO
4. HAVE YOU HAD THESE THOUGHTS AND HAD SOME INTENTION OF ACTING ON THEM?: NO
7. DID THIS OCCUR IN THE LAST THREE MONTHS: NO
5. HAVE YOU STARTED TO WORK OUT OR WORKED OUT THE DETAILS OF HOW TO KILL YOURSELF? DO YOU INTEND TO CARRY OUT THIS PLAN?: NO

## 2023-10-18 NOTE — TELEPHONE ENCOUNTER
Nausea or Vomiting 7/18/23     KIMBERLYE Pruett CNP   FLUoxetine (PROZAC) 20 MG capsule Take 1 capsule by mouth daily 7/18/23     Kelton EcheverriaKIMBERLEY CNP         Social History   Social History            Socioeconomic History    Marital status:    Tobacco Use    Smoking status: Former       Packs/day: 0.50       Years: 3.00       Pack years: 1.50       Types: Cigarettes    Smokeless tobacco: Never   Vaping Use    Vaping Use: Never used   Substance and Sexual Activity    Alcohol use: No    Drug use: No    Sexual activity: Yes       Partners: Male      Social Determinants of Health          Financial Resource Strain: Low Risk     Difficulty of Paying Living Expenses: Not hard at all   Food Insecurity: No Food Insecurity    Worried About Running Out of Food in the Last Year: Never true    Ran Out of Food in the Last Year: Never true   Transportation Needs: Unknown    Lack of Transportation (Non-Medical): No   Housing Stability: Unknown    Unstable Housing in the Last Year: No            MSE:  Appearance: Appropriately groomed. Made good eye contact. Gait stable. No abnormal movements or tremor. Behavior: Calm, cooperative, and socially appropriate. No psychomotor retardation/agitation appreciated. Speech: Normal in tone, volume, and quality. No slurring, dysarthria or pressured speech noted. Mood: \"Good\"   Affect: Mood congruent. Thought Process: Appears linear, logical and goal oriented. Causality appears intact. Thought Content: Denies active suicidal and homicidal ideations. No overt delusions or paranoia appreciated. Perceptions: Denies auditory or visual hallucinations at present time. Not responding to internal stimuli. Concentration: Intact. Orientation: to person, place, date, and situation. Language: Intact. Fund of information: Intact. Memory: Recent and remote appear intact. Impulsivity: Limited. Neurovegitative: Fair appetite and sleep. Insight: Fair.

## 2023-10-18 NOTE — PROGRESS NOTES
strategies to manage symptoms. 10.Over 50% of the total visit time of   30  minutes was spent on counseling and/or coordination of care of:                        1. Mild episode of recurrent major depressive disorder ([address])    2. DORCAS (generalized anxiety disorder)    3. Grief                        Psychotherapy Topics: mood/medication effectiveness family, health, and occupational    Mae Zamora, APRN - CNP    This dictation was generated by voice recognition computer software. Although all attempts are made to edit the dictation for accuracy, there may be errors in the transcription that are not intended.

## 2023-10-27 DIAGNOSIS — F41.9 ANXIETY: ICD-10-CM

## 2023-10-27 RX ORDER — LORAZEPAM 1 MG/1
1 TABLET ORAL 2 TIMES DAILY PRN
Qty: 60 TABLET | Refills: 0 | Status: SHIPPED | OUTPATIENT
Start: 2023-10-27 | End: 2023-11-26

## 2023-10-27 RX ORDER — LORAZEPAM 1 MG/1
1 TABLET ORAL 2 TIMES DAILY PRN
Qty: 60 TABLET | Refills: 0 | OUTPATIENT
Start: 2023-10-27 | End: 2023-11-26

## 2023-10-27 NOTE — TELEPHONE ENCOUNTER
Provider needs to review PDMP    PDMP Monitoring:    Last PDMP Zain Whaley as Reviewed [facility]):  Review User Review Instant Review Result   Demandy Reichelvis 7/18/2023 12:08 PM Reviewed PDMP [1]     Urine Drug Screenings (1 yr)       Urine Drug Screen  Collected: 5/3/2020  8:10 PM (Final result)              Urine Drug Screen  Collected: 10/29/2018  9:30 PM (Final result)                  Medication Contract and Consent for Opioid Use Documents Filed       Patient Documents       Type of Document Status Date Received Received By Description    Medication Contract Received 7/18/2023 12:14 PM Toshia Pabon Medication Contract    Medication Contract Received 8/8/2023  3:03 PM Pauline Hernández

## 2023-10-31 ENCOUNTER — TELEPHONE (OUTPATIENT)
Dept: PSYCHIATRY | Age: 33
End: 2023-10-31

## 2023-10-31 NOTE — TELEPHONE ENCOUNTER
Called and lvm letting pt know that we are needing to reschedule her appt for 11/3 @ 3 pm due to [address] is out sick       Electronically signed by Emanuel Cristina on 10/31/2023 at 12:19 PM

## 2023-11-17 ENCOUNTER — TELEPHONE (OUTPATIENT)
Dept: PSYCHIATRY | Age: 33
End: 2023-11-17

## 2023-11-17 NOTE — TELEPHONE ENCOUNTER
Pt called to cancel/reschedule appt with Rosemarie Anh for today 11/17/23 because she has a wedding to go to. Scheduled for 1/11/24 @ 2:00.     Electronically signed by Harpreet Guevara MA on 11/17/2023 at 10:20 AM

## 2023-11-22 RX ORDER — SEMAGLUTIDE 0.5 MG/.5ML
0.5 INJECTION, SOLUTION SUBCUTANEOUS
Qty: 3 ML | Refills: 2 | Status: SHIPPED | OUTPATIENT
Start: 2023-11-22 | End: 2024-01-05 | Stop reason: SDUPTHER

## 2023-11-28 ENCOUNTER — TELEPHONE (OUTPATIENT)
Dept: PSYCHIATRY | Age: 33
End: 2023-11-28

## 2023-11-28 NOTE — TELEPHONE ENCOUNTER
Called and confirmed appt with pt for 11/30 @ 2 PM    Electronically signed by Priyanka Girard on 11/28/2023 at 2:11 PM

## 2023-11-30 ENCOUNTER — SCHEDULED TELEPHONE ENCOUNTER (OUTPATIENT)
Dept: PSYCHIATRY | Age: 33
End: 2023-11-30
Payer: COMMERCIAL

## 2023-11-30 DIAGNOSIS — F33.0 MILD EPISODE OF RECURRENT MAJOR DEPRESSIVE DISORDER (HCC): Primary | ICD-10-CM

## 2023-11-30 DIAGNOSIS — F41.1 GAD (GENERALIZED ANXIETY DISORDER): ICD-10-CM

## 2023-11-30 PROCEDURE — 99442 PR PHYS/QHP TELEPHONE EVALUATION 11-20 MIN: CPT | Performed by: NURSE PRACTITIONER

## 2023-11-30 ASSESSMENT — PATIENT HEALTH QUESTIONNAIRE - PHQ9
3. TROUBLE FALLING OR STAYING ASLEEP: 0
10. IF YOU CHECKED OFF ANY PROBLEMS, HOW DIFFICULT HAVE THESE PROBLEMS MADE IT FOR YOU TO DO YOUR WORK, TAKE CARE OF THINGS AT HOME, OR GET ALONG WITH OTHER PEOPLE: 0
1. LITTLE INTEREST OR PLEASURE IN DOING THINGS: 0
SUM OF ALL RESPONSES TO PHQ QUESTIONS 1-9: 0
SUM OF ALL RESPONSES TO PHQ QUESTIONS 1-9: 0
5. POOR APPETITE OR OVEREATING: 0
SUM OF ALL RESPONSES TO PHQ QUESTIONS 1-9: 0
SUM OF ALL RESPONSES TO PHQ9 QUESTIONS 1 & 2: 0
SUM OF ALL RESPONSES TO PHQ QUESTIONS 1-9: 0
6. FEELING BAD ABOUT YOURSELF - OR THAT YOU ARE A FAILURE OR HAVE LET YOURSELF OR YOUR FAMILY DOWN: 0
9. THOUGHTS THAT YOU WOULD BE BETTER OFF DEAD, OR OF HURTING YOURSELF: 0
7. TROUBLE CONCENTRATING ON THINGS, SUCH AS READING THE NEWSPAPER OR WATCHING TELEVISION: 0
4. FEELING TIRED OR HAVING LITTLE ENERGY: 0
8. MOVING OR SPEAKING SO SLOWLY THAT OTHER PEOPLE COULD HAVE NOTICED. OR THE OPPOSITE, BEING SO FIGETY OR RESTLESS THAT YOU HAVE BEEN MOVING AROUND A LOT MORE THAN USUAL: 0
2. FEELING DOWN, DEPRESSED OR HOPELESS: 0

## 2023-11-30 NOTE — PROGRESS NOTES
04/08/2020    CO2 19 (L) 04/08/2020    BUN 10 04/08/2020    CREATININE 0.5 04/08/2020    GLUCOSE 103 04/08/2020    CALCIUM 9.0 04/08/2020    PROT 6.3 (L) 04/08/2020    LABALBU 3.4 (L) 04/08/2020    BILITOT 0.3 04/08/2020    ALKPHOS 77 04/08/2020    AST 11 04/08/2020    ALT 8 04/08/2020    LABGLOM >60 04/08/2020     Lab Results   Component Value Date/Time     04/08/2020 04:17 PM    K 3.8 04/08/2020 04:17 PM     04/08/2020 04:17 PM    CO2 19 04/08/2020 04:17 PM    BUN 10 04/08/2020 04:17 PM    CREATININE 0.5 04/08/2020 04:17 PM    GLUCOSE 103 04/08/2020 04:17 PM    CALCIUM 9.0 04/08/2020 04:17 PM      Lab Results   Component Value Date    CHOL 130 03/14/2013     Lab Results   Component Value Date    TRIG 75 03/14/2013     Lab Results   Component Value Date    HDL 36 03/14/2013     No results found for: \"LDLCHOLESTEROL\", \"LDLCALC\"  No results found for: \"LABVLDL\", \"VLDL\"  No results found for: \"CHOLHDLRATIO\"  No results found for: \"LABA1C\"  No results found for: \"EAG\"  Lab Results   Component Value Date    TSH 1.460 09/24/2020     Lab Results   Component Value Date    VITD25 47.4 06/29/2021     Lab Results   Component Value Date    CBVWPQOV63 739 06/29/2021      Lab Results   Component Value Date    FOLATE 8.0 06/29/2021        Assessment:   1. Mild episode of recurrent major depressive disorder ([address])    2. DORCAS (generalized anxiety disorder)            No evidence of acute suicidality, homicidality or psychosis observed. Patient is psychiatrically stable    Plan:    1. Continue   Prozac 20 mg daily for depression and anxiety  Trazodone 100 mg nightly for insomnia  Vraylar 1.5 mg daily for depression and mood      Prescribed by other  Ativan 1 mg twice daily as needed for anxiety     The risks, benefits, side effects, indications, contraindications, and adverse effects of the medications have been discussed.  Yes.  2. The pt has verbalized understanding and has capacity to give informed

## 2023-12-08 ENCOUNTER — OFFICE VISIT (OUTPATIENT)
Dept: PRIMARY CARE CLINIC | Age: 33
End: 2023-12-08
Payer: COMMERCIAL

## 2023-12-08 VITALS
OXYGEN SATURATION: 98 % | RESPIRATION RATE: 16 BRPM | SYSTOLIC BLOOD PRESSURE: 122 MMHG | BODY MASS INDEX: 43.4 KG/M2 | TEMPERATURE: 98.3 F | HEART RATE: 92 BPM | WEIGHT: 293 LBS | DIASTOLIC BLOOD PRESSURE: 80 MMHG | HEIGHT: 69 IN

## 2023-12-08 DIAGNOSIS — I15.9 SECONDARY HYPERTENSION: ICD-10-CM

## 2023-12-08 DIAGNOSIS — F41.9 ANXIETY: ICD-10-CM

## 2023-12-08 DIAGNOSIS — R63.5 WEIGHT GAIN: ICD-10-CM

## 2023-12-08 DIAGNOSIS — R53.83 OTHER FATIGUE: ICD-10-CM

## 2023-12-08 DIAGNOSIS — G47.10 DAYTIME HYPERSOMNIA: ICD-10-CM

## 2023-12-08 DIAGNOSIS — E53.8 B12 DEFICIENCY: ICD-10-CM

## 2023-12-08 DIAGNOSIS — F41.8 DEPRESSION WITH ANXIETY: Primary | ICD-10-CM

## 2023-12-08 LAB
25(OH)D3 SERPL-MCNC: 47.9 NG/ML
ALBUMIN SERPL-MCNC: 4.4 G/DL (ref 3.5–5.2)
ALP SERPL-CCNC: 64 U/L (ref 35–104)
ALT SERPL-CCNC: 19 U/L (ref 5–33)
ANION GAP SERPL CALCULATED.3IONS-SCNC: 13 MMOL/L (ref 7–19)
AST SERPL-CCNC: 17 U/L (ref 5–32)
BASOPHILS # BLD: 0 K/UL (ref 0–0.2)
BASOPHILS NFR BLD: 0.7 % (ref 0–1)
BILIRUB SERPL-MCNC: 0.3 MG/DL (ref 0.2–1.2)
BUN SERPL-MCNC: 19 MG/DL (ref 6–20)
CALCIUM SERPL-MCNC: 9.6 MG/DL (ref 8.6–10)
CHLORIDE SERPL-SCNC: 106 MMOL/L (ref 98–111)
CO2 SERPL-SCNC: 24 MMOL/L (ref 22–29)
CREAT SERPL-MCNC: 0.8 MG/DL (ref 0.5–0.9)
EOSINOPHIL # BLD: 0.2 K/UL (ref 0–0.6)
EOSINOPHIL NFR BLD: 2.9 % (ref 0–5)
ERYTHROCYTE [DISTWIDTH] IN BLOOD BY AUTOMATED COUNT: 13.1 % (ref 11.5–14.5)
GLUCOSE SERPL-MCNC: 93 MG/DL (ref 74–109)
HBA1C MFR BLD: 4.9 % (ref 4–6)
HCT VFR BLD AUTO: 36.6 % (ref 37–47)
HGB BLD-MCNC: 12 G/DL (ref 12–16)
IMM GRANULOCYTES # BLD: 0 K/UL
LYMPHOCYTES # BLD: 2 K/UL (ref 1.1–4.5)
LYMPHOCYTES NFR BLD: 34.1 % (ref 20–40)
MCH RBC QN AUTO: 29.6 PG (ref 27–31)
MCHC RBC AUTO-ENTMCNC: 32.8 G/DL (ref 33–37)
MCV RBC AUTO: 90.1 FL (ref 81–99)
MONOCYTES # BLD: 0.4 K/UL (ref 0–0.9)
MONOCYTES NFR BLD: 6.2 % (ref 0–10)
NEUTROPHILS # BLD: 3.2 K/UL (ref 1.5–7.5)
NEUTS SEG NFR BLD: 55.6 % (ref 50–65)
PLATELET # BLD AUTO: 300 K/UL (ref 130–400)
PMV BLD AUTO: 10.9 FL (ref 9.4–12.3)
POTASSIUM SERPL-SCNC: 4.2 MMOL/L (ref 3.5–5)
PROT SERPL-MCNC: 6.8 G/DL (ref 6.6–8.7)
RBC # BLD AUTO: 4.06 M/UL (ref 4.2–5.4)
SODIUM SERPL-SCNC: 143 MMOL/L (ref 136–145)
TSH SERPL DL<=0.005 MIU/L-ACNC: 2.13 UIU/ML (ref 0.27–4.2)
VIT B12 SERPL-MCNC: 1708 PG/ML (ref 211–946)
WBC # BLD AUTO: 5.8 K/UL (ref 4.8–10.8)

## 2023-12-08 PROCEDURE — 3074F SYST BP LT 130 MM HG: CPT | Performed by: NURSE PRACTITIONER

## 2023-12-08 PROCEDURE — 99214 OFFICE O/P EST MOD 30 MIN: CPT | Performed by: NURSE PRACTITIONER

## 2023-12-08 PROCEDURE — 96372 THER/PROPH/DIAG INJ SC/IM: CPT | Performed by: NURSE PRACTITIONER

## 2023-12-08 PROCEDURE — 3079F DIAST BP 80-89 MM HG: CPT | Performed by: NURSE PRACTITIONER

## 2023-12-08 RX ORDER — CYANOCOBALAMIN 1000 UG/ML
1000 INJECTION, SOLUTION INTRAMUSCULAR; SUBCUTANEOUS ONCE
Status: COMPLETED | OUTPATIENT
Start: 2023-12-08 | End: 2023-12-08

## 2023-12-08 RX ORDER — LORAZEPAM 1 MG/1
1 TABLET ORAL 2 TIMES DAILY PRN
Qty: 60 TABLET | Refills: 0 | Status: SHIPPED | OUTPATIENT
Start: 2023-12-08 | End: 2024-01-07

## 2023-12-08 RX ORDER — ONDANSETRON 4 MG/1
4 TABLET, FILM COATED ORAL 3 TIMES DAILY PRN
Qty: 60 TABLET | Refills: 1 | Status: SHIPPED | OUTPATIENT
Start: 2023-12-08

## 2023-12-08 RX ADMIN — CYANOCOBALAMIN 1000 MCG: 1000 INJECTION, SOLUTION INTRAMUSCULAR; SUBCUTANEOUS at 10:29

## 2023-12-08 ASSESSMENT — ENCOUNTER SYMPTOMS
GASTROINTESTINAL NEGATIVE: 1
EYES NEGATIVE: 1
RESPIRATORY NEGATIVE: 1

## 2023-12-08 NOTE — PATIENT INSTRUCTIONS
The is a weight loss medication that is a once a week injection. You start at the 0.25 subcu injection weekly for a month and then you can go up to the 0.5 mg the second month. You will need to call for prescription for this. It does continue to go up in dose as long as you are tolerating the side effects  You may use Zofran for nausea  You must eat small meals and do not overeat  You can start something for acid reflux if you are experiencing it and not on anything  Make sure you are not getting constipated you may use magnesium 250 mg daily over-the-counter or stool softeners and if no bowel movement in 2 days MiraLAX. Labs today  If any abnormal will treat  If all n ormal consider a home sleep study.

## 2023-12-08 NOTE — PROGRESS NOTES
[facility] [address]  [address] [place], [place] [id number]  Dept: [phone]  Dept Fax: [phone]  Loc: [phone]    Monika Pierson is a 29 y.o. female who presents today for her medical conditions/complaints as noted below. Monika Pierson is c/o of Fatigue (Patient presents today with c/o fatigue and not having any energy)        HPI:     HPI   Chief Complaint   Patient presents with    Fatigue     Patient presents today with c/o fatigue and not having any energy   She is supposed to be on Hue Beady for weight loss but the pharmacy could not get it in. She is taking her Prozac trazodone and Arlyce Boards she gets this through psychiatry at [facility] She feels like this combination has done well for her mood. She has gained a little weight. She feels tired all the time when she does not have any energy. Her  has had a vasectomy. She is not on birth control. Her relationships are very good at work and at home she is not having any stress from that. She wakes up and does not feel like she slept at all. She does not know if she snores. She does not wear a sleep apnea machine. Her mother does have really bad diabetes and MS. She has not had any neurological changes.   She was on the Hue Beady and doing well with that but could not get it so she has gained some of her weight  Past Medical History:   Diagnosis Date    Allergic rhinitis     Anxiety     Chiari malformation     Depression     History of chicken pox     Neurologic disorder     has not had one since 5th grade    Recurrent acute otitis media     as a child, required tubes, then L TM reconstruction    Seizures ([address])     absent, as a child       Past Surgical History:   Procedure Laterality Date    CHOLECYSTECTOMY      DILATION AND CURETTAGE  05/02/13    INNER EAR SURGERY  2009    L ear TM reconstruction    TYMPANOSTOMY TUBE PLACEMENT             12/8/2023    10:00 AM 10/18/2023     1:59 PM 9/12/2023

## 2024-01-05 ENCOUNTER — OFFICE VISIT (OUTPATIENT)
Dept: OBGYN CLINIC | Age: 34
End: 2024-01-05

## 2024-01-05 VITALS
SYSTOLIC BLOOD PRESSURE: 121 MMHG | WEIGHT: 290 LBS | BODY MASS INDEX: 42.95 KG/M2 | DIASTOLIC BLOOD PRESSURE: 81 MMHG | HEART RATE: 80 BPM | HEIGHT: 69 IN

## 2024-01-05 DIAGNOSIS — Z01.419 ENCOUNTER FOR WELL WOMAN EXAM WITH ROUTINE GYNECOLOGICAL EXAM: ICD-10-CM

## 2024-01-05 DIAGNOSIS — Z13.31 STANDARDIZED ADULT DEPRESSION SCREENING TOOL COMPLETED: ICD-10-CM

## 2024-01-05 DIAGNOSIS — Z12.4 CERVICAL CANCER SCREENING: ICD-10-CM

## 2024-01-05 DIAGNOSIS — Z11.51 SCREENING FOR HPV (HUMAN PAPILLOMAVIRUS): ICD-10-CM

## 2024-01-05 DIAGNOSIS — I83.813 VARICOSE VEINS OF BOTH LOWER EXTREMITIES WITH PAIN: ICD-10-CM

## 2024-01-05 DIAGNOSIS — I83.93 SPIDER VEINS OF BOTH LOWER EXTREMITIES: ICD-10-CM

## 2024-01-05 DIAGNOSIS — Z13.31 DEPRESSION SCREENING NEGATIVE: ICD-10-CM

## 2024-01-05 DIAGNOSIS — Z76.89 ENCOUNTER TO ESTABLISH CARE: Primary | ICD-10-CM

## 2024-01-05 DIAGNOSIS — Z12.39 SCREENING BREAST EXAMINATION: ICD-10-CM

## 2024-01-05 LAB — HPV16+18+H RISK 12 DNA SPEC-IMP: NORMAL

## 2024-01-05 RX ORDER — SEMAGLUTIDE 0.5 MG/.5ML
0.5 INJECTION, SOLUTION SUBCUTANEOUS
Qty: 3 ML | Refills: 2 | Status: SHIPPED | OUTPATIENT
Start: 2024-01-05

## 2024-01-05 ASSESSMENT — PATIENT HEALTH QUESTIONNAIRE - PHQ9
4. FEELING TIRED OR HAVING LITTLE ENERGY: 1
5. POOR APPETITE OR OVEREATING: 1
SUM OF ALL RESPONSES TO PHQ QUESTIONS 1-9: 2
SUM OF ALL RESPONSES TO PHQ QUESTIONS 1-9: 5
SUM OF ALL RESPONSES TO PHQ QUESTIONS 1-9: 2
2. FEELING DOWN, DEPRESSED OR HOPELESS: 1
7. TROUBLE CONCENTRATING ON THINGS, SUCH AS READING THE NEWSPAPER OR WATCHING TELEVISION: 0
8. MOVING OR SPEAKING SO SLOWLY THAT OTHER PEOPLE COULD HAVE NOTICED. OR THE OPPOSITE, BEING SO FIGETY OR RESTLESS THAT YOU HAVE BEEN MOVING AROUND A LOT MORE THAN USUAL: 0
SUM OF ALL RESPONSES TO PHQ QUESTIONS 1-9: 5
SUM OF ALL RESPONSES TO PHQ QUESTIONS 1-9: 2
SUM OF ALL RESPONSES TO PHQ QUESTIONS 1-9: 5
3. TROUBLE FALLING OR STAYING ASLEEP: 1
2. FEELING DOWN, DEPRESSED OR HOPELESS: 1
3. TROUBLE FALLING OR STAYING ASLEEP: 1
9. THOUGHTS THAT YOU WOULD BE BETTER OFF DEAD, OR OF HURTING YOURSELF: 0
6. FEELING BAD ABOUT YOURSELF - OR THAT YOU ARE A FAILURE OR HAVE LET YOURSELF OR YOUR FAMILY DOWN: 1
SUM OF ALL RESPONSES TO PHQ QUESTIONS 1-9: 2
SUM OF ALL RESPONSES TO PHQ QUESTIONS 1-9: 5

## 2024-01-05 ASSESSMENT — ENCOUNTER SYMPTOMS
NAUSEA: 0
DIARRHEA: 0
ABDOMINAL PAIN: 0
BACK PAIN: 0
GASTROINTESTINAL NEGATIVE: 1
SHORTNESS OF BREATH: 0
RESPIRATORY NEGATIVE: 1
CHEST TIGHTNESS: 0
RECTAL PAIN: 0
CONSTIPATION: 0

## 2024-01-05 ASSESSMENT — ANXIETY QUESTIONNAIRES
5. BEING SO RESTLESS THAT IT IS HARD TO SIT STILL: 0
7. FEELING AFRAID AS IF SOMETHING AWFUL MIGHT HAPPEN: 0
1. FEELING NERVOUS, ANXIOUS, OR ON EDGE: 1
4. TROUBLE RELAXING: 1
GAD7 TOTAL SCORE: 5
6. BECOMING EASILY ANNOYED OR IRRITABLE: 1
2. NOT BEING ABLE TO STOP OR CONTROL WORRYING: 1
3. WORRYING TOO MUCH ABOUT DIFFERENT THINGS: 1

## 2024-01-05 NOTE — PROGRESS NOTES
[facility]  CNM Office Note    Flavia Hightower is a 33 y.o. female who presents today for her medical conditions/ complaints as noted below.  Chief Complaint   Patient presents with    New Patient     Last mammogram:  none  Last pap smear:    Contraception:  none  :  4  Para:  3  AB:  1  Last bone density:  none  Last colonoscopy:   none    HPI  Flavia Hightower presents today for annual exam. She is due for pap smear. She denies history of abnormal pap smears. She denies family history of breast cancer. She reports her periods are regular every 28-30 days. She reports her menses are menorrhagia (heavy).  Her menses last 5-6 days and are heavy for most of those days. She denies issues with bladder, bowel, or intercourse. Her choice of contraception is: vasectomy. She feels her sleep pattern and quality is Good.      Problems/Complaints today:  1. Encounter to establish care  2. Encounter for well woman exam with routine gynecological exam  -     Human papillomavirus (HPV) DNA probe thin prep high risk  -     PAP SMEAR  3. Cervical cancer screening  -     PAP SMEAR  4. Screening breast examination  5. Standardized adult depression screening tool completed  6. Depression screening negative  7. Screening for HPV (human papillomavirus)  -     Human papillomavirus (HPV) DNA probe thin prep high risk  -     PAP SMEAR  8. Varicose veins of both lower extremities with pain  9. Spider veins of both lower extremities       Patient Active Problem List   Diagnosis    Allergic rhinitis, seasonal    Anxiety    Depression with anxiety    Hypertension    Spider veins of both lower extremities    Varicose veins of bilateral lower extremities with other complications       Patient's last menstrual period was 2023 (exact date).      Past Medical History:   Diagnosis Date    Allergic rhinitis     Anxiety     Chiari malformation     Depression     History of chicken pox     Neurologic

## 2024-01-05 NOTE — PROGRESS NOTES
Pt presents today for pap smear and breast exam.  She also complains of     Last mammogram:  none  Last pap smear:    Contraception:  none  :  4  Para:  3  AB:  1  Last bone density:  none  Last colonoscopy:   none

## 2024-01-05 NOTE — PATIENT INSTRUCTIONS
Patient Education        Breast Self-Exam: Care Instructions  Overview     A breast self-exam means regularly checking your breasts for lumps or changes. Depending on your health history, your doctor may tell you to do breast self-exams. These help you learn how your breasts normally look and feel. Most breast problems or changes are not because of cancer.  Breast self-exams don't take the place of a mammogram. Having regular breast exams by your doctor and mammograms can improve your chances of finding any problems with your breasts.  If you notice a change in your breast, tell your doctor. This may include any new lump, nipple discharge, or redness or a change in the skin's usual color.  Follow-up care is a key part of your treatment and safety. Be sure to make and go to all appointments, and call your doctor if you are having problems. It's also a good idea to know your test results and keep a list of the medicines you take.  How do you do a breast self-exam?  Try to set a time each month to do a step-by-step breast self-exam. If you have a menstrual period, the best time to check your breasts is usually 1 week after your period begins. Your breasts should not be tender then. If you don't have periods, you might do your exam on a day of the month that is easy to remember.  To check your breasts:  Remove all your clothes above the waist and lie down. When you are lying down, your breast tissue spreads evenly over your chest wall, which makes it easier to feel all your breast tissue.  Use the pads--not the fingertips--of the 3 middle fingers of your left hand to check your right breast. Move your fingers slowly in small coin-sized circles that overlap.  Use three levels of pressure to feel all of your breast tissue. Use light pressure to feel the tissue close to the skin surface. Use medium pressure to feel a little deeper. Use firm pressure to feel your tissue close to your breastbone and ribs. Use each pressure

## 2024-01-10 ENCOUNTER — TELEPHONE (OUTPATIENT)
Dept: PSYCHIATRY | Age: 34
End: 2024-01-10

## 2024-01-10 LAB
HPV HR 12 DNA SPEC QL NAA+PROBE: NOT DETECTED
HPV16 DNA SPEC QL NAA+PROBE: NOT DETECTED
HPV16+18+H RISK 12 DNA SPEC-IMP: NORMAL
HPV18 DNA SPEC QL NAA+PROBE: NOT DETECTED

## 2024-01-10 NOTE — TELEPHONE ENCOUNTER
Called patient to remind them of their appointment   -left voicemail, requesting a return call     Reminded patient to complete their visit pre-check/digital registration in [employer].    Electronically signed by Elisha Fish MA on 1/10/2024 at 12:40 PM

## 2024-01-10 NOTE — TELEPHONE ENCOUNTER
Called patient to remind them of their appointment   -left voicemail, requesting a return call     Reminded patient to complete their visit pre-check/digital registration in [employer].    Electronically signed by Elisha Fish MA on 1/10/2024 at 12:25 PM

## 2024-01-11 ENCOUNTER — OFFICE VISIT (OUTPATIENT)
Dept: PSYCHIATRY | Age: 34
End: 2024-01-11
Payer: COMMERCIAL

## 2024-01-11 DIAGNOSIS — I83.813 VARICOSE VEINS OF BOTH LOWER EXTREMITIES WITH PAIN: Primary | ICD-10-CM

## 2024-01-11 DIAGNOSIS — F33.0 MILD EPISODE OF RECURRENT MAJOR DEPRESSIVE DISORDER (HCC): Primary | ICD-10-CM

## 2024-01-11 DIAGNOSIS — F41.1 GAD (GENERALIZED ANXIETY DISORDER): ICD-10-CM

## 2024-01-11 DIAGNOSIS — I83.93 SPIDER VEINS OF BOTH LOWER EXTREMITIES: ICD-10-CM

## 2024-01-11 PROCEDURE — 90837 PSYTX W PT 60 MINUTES: CPT

## 2024-01-11 ASSESSMENT — PATIENT HEALTH QUESTIONNAIRE - PHQ9
1. LITTLE INTEREST OR PLEASURE IN DOING THINGS: 1
SUM OF ALL RESPONSES TO PHQ QUESTIONS 1-9: 2
2. FEELING DOWN, DEPRESSED OR HOPELESS: 1
SUM OF ALL RESPONSES TO PHQ9 QUESTIONS 1 & 2: 2
SUM OF ALL RESPONSES TO PHQ QUESTIONS 1-9: 2

## 2024-01-11 ASSESSMENT — ANXIETY QUESTIONNAIRES
IF YOU CHECKED OFF ANY PROBLEMS ON THIS QUESTIONNAIRE, HOW DIFFICULT HAVE THESE PROBLEMS MADE IT FOR YOU TO DO YOUR WORK, TAKE CARE OF THINGS AT HOME, OR GET ALONG WITH OTHER PEOPLE: SOMEWHAT DIFFICULT
7. FEELING AFRAID AS IF SOMETHING AWFUL MIGHT HAPPEN: 2
2. NOT BEING ABLE TO STOP OR CONTROL WORRYING: 2
5. BEING SO RESTLESS THAT IT IS HARD TO SIT STILL: 1
4. TROUBLE RELAXING: 0
6. BECOMING EASILY ANNOYED OR IRRITABLE: 2
1. FEELING NERVOUS, ANXIOUS, OR ON EDGE: 1
3. WORRYING TOO MUCH ABOUT DIFFERENT THINGS: 1
GAD7 TOTAL SCORE: 9

## 2024-01-11 NOTE — PROGRESS NOTES
(Non-Medical): No   Physical Activity: Not on file   Stress: Not on file   Social Connections: Not on file   Intimate Partner Violence: Not on file   Housing Stability: Unknown (7/18/2023)    Housing Stability Vital Sign     Unable to Pay for Housing in the Last Year: Not on file     Number of Places Lived in the Last Year: Not on file     Unstable Housing in the Last Year: No       TOBACCO:   reports that she has quit smoking. Her smoking use included cigarettes. She has a 1.5 pack-year smoking history. She has never used smokeless tobacco.  ETOH:   reports current alcohol use.    Family History:   Family History   Problem Relation Age of Onset    High Blood Pressure Mother     Depression Mother     Colon Polyps Father     Colon Polyps Maternal Grandfather     Colon Cancer Neg Hx        Diagnosis:    Mild episode of recurrent major depressive disorder  DORCAS (generalized anxiety disorder)      Diagnosis Date    Allergic rhinitis     Anxiety     Chiari malformation     Depression     History of chicken pox     Neurologic disorder     has not had one since 5th grade    Recurrent acute otitis media     as a child, required tubes, then L TM reconstruction    Seizures (HCC)     absent, as a child      Other psychosocial and environmental problems    Plan:  1. Continue medication management  2. CBT to target cognitive distortions  3. Discuss therapeutic goals    Pt interventions:  Provided handout on  grounding techniques, Trained in relaxation strategies, Provided education, Established rapport, Supportive techniques, and Explained relaxed breathing technique in detail and practiced this with pt in visit      Over 50% of the total visit time of visit was spent on counseling and/or coordination of care     Clarisa Vines MS, [facility]

## 2024-01-31 ENCOUNTER — TELEPHONE (OUTPATIENT)
Dept: PSYCHIATRY | Age: 34
End: 2024-01-31

## 2024-01-31 NOTE — TELEPHONE ENCOUNTER
Called patient to remind them of their appointment   -left voicemail, requesting a return call    Reminded patient to complete their visit pre-check/digital registration in [employer].    Electronically signed by Elisha Fish MA on 1/31/2024 at 1:42 PM

## 2024-02-01 ENCOUNTER — TELEPHONE (OUTPATIENT)
Dept: PSYCHIATRY | Age: 34
End: 2024-02-01

## 2024-02-01 NOTE — TELEPHONE ENCOUNTER
[facility] Office called the patient to check on them and to reschedule their appointment.     Left voicemail for patient to call the office back at [phone] Option 3.. Discussed the three no show and dismissal policies with the patient. Provided education that after three no show, patients can be dismissed by the provider and practice. Patient verbally understood. Barriers to treatment was discussed with the patient.    Barriers to treatment: N/A - didn't speak to patient.      Electronically signed by Peng Ricks on 2/1/2024 at 4:17 PM

## 2024-02-02 ENCOUNTER — TELEPHONE (OUTPATIENT)
Dept: PSYCHIATRY | Age: 34
End: 2024-02-02

## 2024-02-02 NOTE — TELEPHONE ENCOUNTER
Called and lvm reminding pt of her appt for 2/5 @ 2 PM      Electronically signed by Peng Ricks on 2/2/2024 at 2:11 PM

## 2024-02-05 ENCOUNTER — OFFICE VISIT (OUTPATIENT)
Dept: PSYCHIATRY | Age: 34
End: 2024-02-05
Payer: COMMERCIAL

## 2024-02-05 DIAGNOSIS — F33.0 MILD EPISODE OF RECURRENT MAJOR DEPRESSIVE DISORDER (HCC): Primary | ICD-10-CM

## 2024-02-05 DIAGNOSIS — F41.1 GAD (GENERALIZED ANXIETY DISORDER): ICD-10-CM

## 2024-02-05 PROCEDURE — 90837 PSYTX W PT 60 MINUTES: CPT

## 2024-02-05 ASSESSMENT — ANXIETY QUESTIONNAIRES
2. NOT BEING ABLE TO STOP OR CONTROL WORRYING: 1
4. TROUBLE RELAXING: 0
IF YOU CHECKED OFF ANY PROBLEMS ON THIS QUESTIONNAIRE, HOW DIFFICULT HAVE THESE PROBLEMS MADE IT FOR YOU TO DO YOUR WORK, TAKE CARE OF THINGS AT HOME, OR GET ALONG WITH OTHER PEOPLE: SOMEWHAT DIFFICULT
3. WORRYING TOO MUCH ABOUT DIFFERENT THINGS: 1
6. BECOMING EASILY ANNOYED OR IRRITABLE: 1
7. FEELING AFRAID AS IF SOMETHING AWFUL MIGHT HAPPEN: 1
GAD7 TOTAL SCORE: 6
5. BEING SO RESTLESS THAT IT IS HARD TO SIT STILL: 1
1. FEELING NERVOUS, ANXIOUS, OR ON EDGE: 1

## 2024-02-05 ASSESSMENT — PATIENT HEALTH QUESTIONNAIRE - PHQ9
1. LITTLE INTEREST OR PLEASURE IN DOING THINGS: 1
SUM OF ALL RESPONSES TO PHQ QUESTIONS 1-9: 2
2. FEELING DOWN, DEPRESSED OR HOPELESS: 1
SUM OF ALL RESPONSES TO PHQ QUESTIONS 1-9: 2
SUM OF ALL RESPONSES TO PHQ QUESTIONS 1-9: 2
SUM OF ALL RESPONSES TO PHQ9 QUESTIONS 1 & 2: 2
SUM OF ALL RESPONSES TO PHQ QUESTIONS 1-9: 2

## 2024-02-05 NOTE — PROGRESS NOTES
writing tasks down and marking the tasks off the list. Handout on grief fact sheet and the five stages of grief discussed and provided. Pt receptive. Explored thoughts, emotions/feelings, and coping strategies. Therapist provided reflective listening/validation, support and encouragement. Upcoming appointment discuss cognitive distortions, ways to help with each cognitive distortion, regret, forgiveness of self, and guilt.      Pt reported she would like to continue the 2 weeks appointments and verbalized she would call sooner if needed. Pt denies Suicidal Ideations, Homicidal Ideation, Auditory Hallucinations, Visual Hallucinations, Tactical Hallucinations.    Assessments:  PHQ- Score: 2 ~ 0-4 Minimal. 5-9 Mild. 10-14 Moderate. 15-19 Moderately severe. 20-27 Severe.  DORCAS-7 Score: 6 ~ 0-4: minimal anxiety. 5-9: mild anxiety. 10-14: moderate anxiety. 15-21: severe anxiety  C-SSRS Suicide Screening : Patient denies current SI. Patient denies current and/or recent thoughts to harm self and Patient denies current and/or recent thoughts to harm others.        MSE:    Appearance    alert, cooperative  Appetite normal  Sleep disturbance No  Fatigue No  Loss of pleasure No  Impulsive behavior No  Speech    normal rate and normal volume  Mood    up and down  Affect    normal affect  Thought Content    intact  Thought Process    linear and goal directed  Associations    logical connections  Insight    Fair  Judgment    Intact  Orientation    oriented to person, place, time, and general circumstances  Memory    recent and remote memory intact  Attention/Concentration    intact  Morbid ideation No  Suicide Assessment    no suicidal ideation      History:  Social History     Socioeconomic History    Marital status:    Tobacco Use    Smoking status: Former     Current packs/day: 0.50     Average packs/day: 0.5 packs/day for 3.0 years (1.5 ttl pk-yrs)     Types: Cigarettes    Smokeless tobacco: Never   Vaping Use    Vaping

## 2024-02-15 ENCOUNTER — PATIENT MESSAGE (OUTPATIENT)
Dept: PRIMARY CARE CLINIC | Age: 34
End: 2024-02-15

## 2024-02-15 DIAGNOSIS — F41.9 ANXIETY: ICD-10-CM

## 2024-02-15 RX ORDER — LORAZEPAM 1 MG/1
1 TABLET ORAL 2 TIMES DAILY PRN
Qty: 60 TABLET | Refills: 0 | Status: SHIPPED | OUTPATIENT
Start: 2024-02-15 | End: 2024-03-16

## 2024-02-15 NOTE — TELEPHONE ENCOUNTER
See additional note from patient below    PDMP Monitoring:    Last PDMP Manoj as Reviewed ([place]):  Review User Review Instant Review Result   ABHISHEK BAZAN 12/8/2023 10:28 AM Reviewed PDMP [1]     Urine Drug Screenings (1 yr)       Urine Drug Screen  Collected: 5/3/2020  8:10 PM (Final result)              Urine Drug Screen  Collected: 10/29/2018  9:30 PM (Final result)                  Medication Contract and Consent for Opioid Use Documents Filed       Patient Documents       Type of Document Status Date Received Received By Description    Medication Contract Received 7/18/2023 12:14 PM CLEMENTINA COTTON Medication Contract    Medication Contract Received 8/8/2023  3:03 PM TIFFANIE SMITH

## 2024-02-15 NOTE — TELEPHONE ENCOUNTER
From: Flavia Hightower  To: [place]  Sent: 2/15/2024 2:45 PM CST  Subject: Wegovy    My insurance no longer covers the shot unless I pay $1000. Do you happen to have anymore samples and can I also get a refill on my Ativan? ThNks so much!

## 2024-02-21 ENCOUNTER — TELEPHONE (OUTPATIENT)
Dept: PSYCHIATRY | Age: 34
End: 2024-02-21

## 2024-02-21 NOTE — TELEPHONE ENCOUNTER
Called patient to remind them of their appointment   -left voicemail, requesting a return call    Reminded patient to complete their visit pre-check/digital registration in [place].    Electronically signed by Elisha Fish MA on 2/21/2024 at 12:07 PM

## 2024-02-22 ENCOUNTER — OFFICE VISIT (OUTPATIENT)
Dept: PSYCHIATRY | Age: 34
End: 2024-02-22
Payer: COMMERCIAL

## 2024-02-22 DIAGNOSIS — F41.1 GAD (GENERALIZED ANXIETY DISORDER): ICD-10-CM

## 2024-02-22 DIAGNOSIS — F33.0 MILD EPISODE OF RECURRENT MAJOR DEPRESSIVE DISORDER (HCC): Primary | ICD-10-CM

## 2024-02-22 PROCEDURE — 90832 PSYTX W PT 30 MINUTES: CPT

## 2024-02-22 ASSESSMENT — ANXIETY QUESTIONNAIRES
6. BECOMING EASILY ANNOYED OR IRRITABLE: 1
1. FEELING NERVOUS, ANXIOUS, OR ON EDGE: 2
2. NOT BEING ABLE TO STOP OR CONTROL WORRYING: 1
GAD7 TOTAL SCORE: 8
4. TROUBLE RELAXING: 1
IF YOU CHECKED OFF ANY PROBLEMS ON THIS QUESTIONNAIRE, HOW DIFFICULT HAVE THESE PROBLEMS MADE IT FOR YOU TO DO YOUR WORK, TAKE CARE OF THINGS AT HOME, OR GET ALONG WITH OTHER PEOPLE: SOMEWHAT DIFFICULT
3. WORRYING TOO MUCH ABOUT DIFFERENT THINGS: 1
5. BEING SO RESTLESS THAT IT IS HARD TO SIT STILL: 1
7. FEELING AFRAID AS IF SOMETHING AWFUL MIGHT HAPPEN: 1

## 2024-02-22 ASSESSMENT — PATIENT HEALTH QUESTIONNAIRE - PHQ9
6. FEELING BAD ABOUT YOURSELF - OR THAT YOU ARE A FAILURE OR HAVE LET YOURSELF OR YOUR FAMILY DOWN: 0
2. FEELING DOWN, DEPRESSED OR HOPELESS: 1
SUM OF ALL RESPONSES TO PHQ QUESTIONS 1-9: 6
SUM OF ALL RESPONSES TO PHQ9 QUESTIONS 1 & 2: 2
3. TROUBLE FALLING OR STAYING ASLEEP: 1
9. THOUGHTS THAT YOU WOULD BE BETTER OFF DEAD, OR OF HURTING YOURSELF: 0
SUM OF ALL RESPONSES TO PHQ QUESTIONS 1-9: 6
SUM OF ALL RESPONSES TO PHQ QUESTIONS 1-9: 6
10. IF YOU CHECKED OFF ANY PROBLEMS, HOW DIFFICULT HAVE THESE PROBLEMS MADE IT FOR YOU TO DO YOUR WORK, TAKE CARE OF THINGS AT HOME, OR GET ALONG WITH OTHER PEOPLE: 1
7. TROUBLE CONCENTRATING ON THINGS, SUCH AS READING THE NEWSPAPER OR WATCHING TELEVISION: 1
1. LITTLE INTEREST OR PLEASURE IN DOING THINGS: 1
8. MOVING OR SPEAKING SO SLOWLY THAT OTHER PEOPLE COULD HAVE NOTICED. OR THE OPPOSITE, BEING SO FIGETY OR RESTLESS THAT YOU HAVE BEEN MOVING AROUND A LOT MORE THAN USUAL: 0
SUM OF ALL RESPONSES TO PHQ QUESTIONS 1-9: 6
4. FEELING TIRED OR HAVING LITTLE ENERGY: 1
5. POOR APPETITE OR OVEREATING: 1

## 2024-02-22 NOTE — PROGRESS NOTES
it has been helping with her anxiety. Explored thoughts, emotions/feelings, and coping strategies. Therapist provided reflective listening/validation, support and encouragement. Upcoming appointment pt requested to discuss forgiveness, guilt, and resentment. Pt reported she would like to continue the 2 weeks appointments and verbalized she would call sooner if needed. Pt denies Suicidal Ideations, Homicidal Ideation, Auditory Hallucinations, Visual Hallucinations, Tactical Hallucinations.    Assessments:  PHQ- Score: 6 ~ 0-4 Minimal. 5-9 Mild. 10-14 Moderate. 15-19 Moderately severe. 20-27 Severe.  DORCAS-7 Score: 8 ~ 0-4: minimal anxiety. 5-9: mild anxiety. 10-14: moderate anxiety. 15-21: severe anxiety  C-SSRS Suicide Screening : Patient denies current SI. Patient denies current and/or recent thoughts to harm self and Patient denies current and/or recent thoughts to harm others.         MSE:    Appearance    alert, cooperative, smiling  Appetite sometimes overeat  Sleep disturbance Yes  Fatigue Yes  Loss of pleasure Yes  Impulsive behavior No  Speech    normal rate and normal volume  Mood    ok  Affect    normal affect  Thought Content    intact  Thought Process    linear and goal directed  Associations    logical connections  Insight    Fair  Judgment    Intact  Orientation    oriented to person, place, time, and general circumstances  Memory    recent and remote memory intact  Attention/Concentration    intact  Morbid ideation No  Suicide Assessment    no suicidal ideation      History:  Social History     Socioeconomic History    Marital status:    Tobacco Use    Smoking status: Former     Current packs/day: 0.50     Average packs/day: 0.5 packs/day for 3.0 years (1.5 ttl pk-yrs)     Types: Cigarettes    Smokeless tobacco: Never   Vaping Use    Vaping Use: Never used   Substance and Sexual Activity    Alcohol use: Yes     Comment: occ    Drug use: No    Sexual activity: Yes     Partners: Male     Social

## 2024-03-08 ENCOUNTER — OFFICE VISIT (OUTPATIENT)
Dept: PRIMARY CARE CLINIC | Age: 34
End: 2024-03-08
Payer: COMMERCIAL

## 2024-03-08 VITALS
TEMPERATURE: 97.2 F | WEIGHT: 293 LBS | BODY MASS INDEX: 43.4 KG/M2 | RESPIRATION RATE: 16 BRPM | OXYGEN SATURATION: 98 % | SYSTOLIC BLOOD PRESSURE: 122 MMHG | DIASTOLIC BLOOD PRESSURE: 84 MMHG | HEIGHT: 69 IN | HEART RATE: 76 BPM

## 2024-03-08 DIAGNOSIS — F41.8 DEPRESSION WITH ANXIETY: Primary | ICD-10-CM

## 2024-03-08 PROCEDURE — 3079F DIAST BP 80-89 MM HG: CPT | Performed by: NURSE PRACTITIONER

## 2024-03-08 PROCEDURE — 3074F SYST BP LT 130 MM HG: CPT | Performed by: NURSE PRACTITIONER

## 2024-03-08 PROCEDURE — 99213 OFFICE O/P EST LOW 20 MIN: CPT | Performed by: NURSE PRACTITIONER

## 2024-03-08 RX ORDER — FLUOXETINE HYDROCHLORIDE 20 MG/1
20 CAPSULE ORAL DAILY
Qty: 90 CAPSULE | Refills: 5 | Status: SHIPPED | OUTPATIENT
Start: 2024-03-08

## 2024-03-08 NOTE — PROGRESS NOTES
Index 43.48 kg/m2 42.83 kg/m2 43.42 kg/m2  42.09 kg/m2 41.94 kg/m2       Family History   Problem Relation Age of Onset    High Blood Pressure Mother     Depression Mother     Colon Polyps Father     Colon Polyps Maternal Grandfather     Colon Cancer Neg Hx        Social History     Tobacco Use    Smoking status: Former     Current packs/day: 0.50     Average packs/day: 0.5 packs/day for 3.0 years (1.5 ttl pk-yrs)     Types: Cigarettes    Smokeless tobacco: Never   Substance Use Topics    Alcohol use: Yes     Comment: occ      Current Outpatient Medications on File Prior to Visit   Medication Sig Dispense Refill    LORazepam (ATIVAN) 1 MG tablet Take 1 tablet by mouth 2 times daily as needed for Anxiety for up to 30 days. Max Daily Amount: 2 mg 60 tablet 0    ondansetron (ZOFRAN) 4 MG tablet Take 1 tablet by mouth 3 times daily as needed for Nausea or Vomiting 60 tablet 1    traZODone (DESYREL) 100 MG tablet Take 1 tablet by mouth nightly as needed for Sleep 30 tablet 2     No current facility-administered medications on file prior to visit.     No Known Allergies    Health Maintenance   Topic Date Due    Hepatitis B vaccine (1 of 3 - 3-dose series) 03/08/2025 (Originally 1990)    Flu vaccine (1) 03/08/2025 (Originally 8/1/2023)    COVID-19 Vaccine (1) 07/18/2030 (Originally 2/7/1991)    Depression Monitoring  02/22/2025    Cervical cancer screen  01/05/2029    DTaP/Tdap/Td vaccine (3 - Td or Tdap) 04/15/2031    HIV screen  Completed    Hepatitis A vaccine  Aged Out    Hib vaccine  Aged Out    HPV vaccine  Aged Out    Polio vaccine  Aged Out    Meningococcal (ACWY) vaccine  Aged Out    Pneumococcal 0-64 years Vaccine  Aged Out    Varicella vaccine  Discontinued    Hepatitis C screen  Discontinued       Subjective:      Review of Systems   Constitutional: Negative.    Psychiatric/Behavioral:  Positive for dysphoric mood and sleep disturbance. Negative for suicidal ideas. The patient is nervous/anxious.

## 2024-03-28 ENCOUNTER — OFFICE VISIT (OUTPATIENT)
Dept: PSYCHIATRY | Age: 34
End: 2024-03-28
Payer: COMMERCIAL

## 2024-03-28 DIAGNOSIS — F41.1 GAD (GENERALIZED ANXIETY DISORDER): ICD-10-CM

## 2024-03-28 DIAGNOSIS — F33.0 MILD EPISODE OF RECURRENT MAJOR DEPRESSIVE DISORDER (HCC): Primary | ICD-10-CM

## 2024-03-28 PROCEDURE — 90834 PSYTX W PT 45 MINUTES: CPT

## 2024-03-28 ASSESSMENT — PATIENT HEALTH QUESTIONNAIRE - PHQ9
SUM OF ALL RESPONSES TO PHQ QUESTIONS 1-9: 10
SUM OF ALL RESPONSES TO PHQ QUESTIONS 1-9: 2
2. FEELING DOWN, DEPRESSED OR HOPELESS: SEVERAL DAYS
SUM OF ALL RESPONSES TO PHQ QUESTIONS 1-9: 10
SUM OF ALL RESPONSES TO PHQ QUESTIONS 1-9: 10
1. LITTLE INTEREST OR PLEASURE IN DOING THINGS: SEVERAL DAYS
SUM OF ALL RESPONSES TO PHQ QUESTIONS 1-9: 2
3. TROUBLE FALLING OR STAYING ASLEEP: SEVERAL DAYS
4. FEELING TIRED OR HAVING LITTLE ENERGY: SEVERAL DAYS
8. MOVING OR SPEAKING SO SLOWLY THAT OTHER PEOPLE COULD HAVE NOTICED. OR THE OPPOSITE, BEING SO FIGETY OR RESTLESS THAT YOU HAVE BEEN MOVING AROUND A LOT MORE THAN USUAL: MORE THAN HALF THE DAYS
SUM OF ALL RESPONSES TO PHQ9 QUESTIONS 1 & 2: 2
10. IF YOU CHECKED OFF ANY PROBLEMS, HOW DIFFICULT HAVE THESE PROBLEMS MADE IT FOR YOU TO DO YOUR WORK, TAKE CARE OF THINGS AT HOME, OR GET ALONG WITH OTHER PEOPLE: SOMEWHAT DIFFICULT
1. LITTLE INTEREST OR PLEASURE IN DOING THINGS: SEVERAL DAYS
SUM OF ALL RESPONSES TO PHQ QUESTIONS 1-9: 2
6. FEELING BAD ABOUT YOURSELF - OR THAT YOU ARE A FAILURE OR HAVE LET YOURSELF OR YOUR FAMILY DOWN: SEVERAL DAYS
2. FEELING DOWN, DEPRESSED OR HOPELESS: SEVERAL DAYS
SUM OF ALL RESPONSES TO PHQ QUESTIONS 1-9: 10
7. TROUBLE CONCENTRATING ON THINGS, SUCH AS READING THE NEWSPAPER OR WATCHING TELEVISION: MORE THAN HALF THE DAYS
SUM OF ALL RESPONSES TO PHQ QUESTIONS 1-9: 2
SUM OF ALL RESPONSES TO PHQ9 QUESTIONS 1 & 2: 2
5. POOR APPETITE OR OVEREATING: SEVERAL DAYS
9. THOUGHTS THAT YOU WOULD BE BETTER OFF DEAD, OR OF HURTING YOURSELF: NOT AT ALL

## 2024-03-28 ASSESSMENT — COLUMBIA-SUICIDE SEVERITY RATING SCALE - C-SSRS
2. HAVE YOU ACTUALLY HAD ANY THOUGHTS OF KILLING YOURSELF?: NO
1. WITHIN THE PAST MONTH, HAVE YOU WISHED YOU WERE DEAD OR WISHED YOU COULD GO TO SLEEP AND NOT WAKE UP?: NO
6. HAVE YOU EVER DONE ANYTHING, STARTED TO DO ANYTHING, OR PREPARED TO DO ANYTHING TO END YOUR LIFE?: NO

## 2024-03-28 ASSESSMENT — ANXIETY QUESTIONNAIRES
7. FEELING AFRAID AS IF SOMETHING AWFUL MIGHT HAPPEN: SEVERAL DAYS
1. FEELING NERVOUS, ANXIOUS, OR ON EDGE: MORE THAN HALF THE DAYS
3. WORRYING TOO MUCH ABOUT DIFFERENT THINGS: SEVERAL DAYS
GAD7 TOTAL SCORE: 8
2. NOT BEING ABLE TO STOP OR CONTROL WORRYING: SEVERAL DAYS
6. BECOMING EASILY ANNOYED OR IRRITABLE: SEVERAL DAYS
IF YOU CHECKED OFF ANY PROBLEMS ON THIS QUESTIONNAIRE, HOW DIFFICULT HAVE THESE PROBLEMS MADE IT FOR YOU TO DO YOUR WORK, TAKE CARE OF THINGS AT HOME, OR GET ALONG WITH OTHER PEOPLE: SOMEWHAT DIFFICULT
5. BEING SO RESTLESS THAT IT IS HARD TO SIT STILL: SEVERAL DAYS
4. TROUBLE RELAXING: SEVERAL DAYS

## 2024-03-28 NOTE — PROGRESS NOTES
Therapy Progress Note  Clarisa Vines M.S., [facility]  3/28/2024    Patient location  : [facility] location : [facility]      Total time spent: 45 minutes  This is patient's fifth  Therapy appointment.  Reason for Consult:  depression, anxiety, and stress  Referring Provider: No referring provider defined for this encounter.      Flavia Hightower ,a 33 y.o. female, for follow up visit.     Pt provided informed consent for the behavioral health program. Discussed with patient model of service to include the limits of confidentiality (i.e. abuse reporting, suicide intervention, etc.) and short-term intervention focused approach.  Discussed no show and late cancellation policy.  Pt indicated understanding.    S:  Pt reports she has been feeling really depressed and gloomy lately. Pt reports she is not sure why. Pt reports she has not really been wanting to get out of bed lately. Pt reports her son fell and broke his foot so he is non weight baring which adds extra stress. Pt reports she cannot stay for the full appointment today because her son is going to change his cast to a water proof cast. Pt reports next week on spring break they are going to [place] and she is hoping this will help her reset. Pt reports they are wanting to rest as much as they can while on her trip. Pt reports having a panic attack about five minutes after she woke up one morning. Pt reports she has been thinking and curious to see if she has ADHD. Pt calm, cooperative, smiling, pleasant, and denies si, hi, avh. Completed assessments, see below. Therapist and pt discussed her being excited about her upcoming trip to [place], [facility], along with depression and anxiety. Pt reports she hopes her son will be able to complete some chen with his broken leg. Explored handicap wheel chair accessible hiking trails and printed off for pt. Explored thoughts, emotions/feelings, and coping strategies. Therapist

## 2024-04-04 ENCOUNTER — PATIENT MESSAGE (OUTPATIENT)
Dept: PRIMARY CARE CLINIC | Age: 34
End: 2024-04-04

## 2024-04-04 ENCOUNTER — TELEPHONE (OUTPATIENT)
Dept: PSYCHIATRY | Age: 34
End: 2024-04-04

## 2024-04-04 DIAGNOSIS — F41.9 ANXIETY: ICD-10-CM

## 2024-04-04 RX ORDER — LORAZEPAM 1 MG/1
1 TABLET ORAL 2 TIMES DAILY PRN
Qty: 60 TABLET | Refills: 0 | Status: SHIPPED | OUTPATIENT
Start: 2024-04-04 | End: 2024-05-04

## 2024-04-04 NOTE — TELEPHONE ENCOUNTER
Called and lvm asking pt to return phone call to schedule an appt    .Electronically signed by Peng Ricks on 4/4/2024 at 3:23 PM

## 2024-04-04 NOTE — TELEPHONE ENCOUNTER
From: Flavia Hightower  To: Snow [place]on  Sent: 4/4/2024 11:58 AM CDT  Subject: Ativan    Can I get a refill on my Ativan, please. Thanks so much!

## 2024-04-04 NOTE — TELEPHONE ENCOUNTER
PDMP Monitoring:    Last PDMP Manoj as Reviewed ([place]):  Review User Review Instant Review Result   ABHISHEK BAZAN 3/8/2024  1:39 PM Reviewed PDMP [1]     Urine Drug Screenings (1 yr)       Urine Drug Screen  Collected: 5/3/2020  8:10 PM (Final result)              Urine Drug Screen  Collected: 10/29/2018  9:30 PM (Final result)                  Medication Contract and Consent for Opioid Use Documents Filed       Patient Documents       Type of Document Status Date Received Received By Description    Medication Contract Received 7/18/2023 12:14 PM CLEMENTINA COTTON Medication Contract    Medication Contract Received 8/8/2023  3:03 PM TIFFANIE SMITH

## 2024-04-05 ENCOUNTER — TELEPHONE (OUTPATIENT)
Dept: PSYCHIATRY | Age: 34
End: 2024-04-05

## 2024-04-05 RX ORDER — TRAZODONE HYDROCHLORIDE 100 MG/1
100 TABLET ORAL NIGHTLY PRN
Qty: 30 TABLET | Refills: 2 | Status: SHIPPED | OUTPATIENT
Start: 2024-04-05

## 2024-04-05 NOTE — TELEPHONE ENCOUNTER
Flavia Hightower called to request a refill on her medication.      Last office visit : 3/28/2024 [facility]  Next office visit : 4/4/2024 [facility]    Requested Prescriptions     Pending Prescriptions Disp Refills    traZODone (DESYREL) 100 MG tablet 30 tablet 2     Sig: Take 1 tablet by mouth nightly as needed for Sleep            Peng Ricks       10/18/23                                          Progress Note     Flavia Hightower 1990                          Chief Complaint   Patient presents with    Medication Check    Follow-up            Subjective:    Patient is a 33 y.o. female diagnosed with MDD DORCAS and presents today for follow-up.  Last seen in clinic on 9/6/23  and prior records were reviewed.     Last visit: over the past month she has noticed a benefit.  She has been sleeping better, decreased mood swings and work life has been good.  She says she has not gotten many comments on doing better but she feels like she is doing well.      Today : she says over the past month she has been doing well.  Her grandmother passed away and she feels like she handled all the events well.  She states she has been sleeping well.  She says she has been feeling a little more fatigued lately but thinks it is appropriate to her life stressors.  She says over the last week she has had a few coworkers tell her that she is moving a bit slower.  She is unsure if this is because of her social stressors, sickness, or medication issues.  She does not want to make any adjustments at this time and just keep track of her symptoms before any medication changes.    She says overall work is going well, she is celebrating her 9 year anniversary today.    Will follow up in 6 weeks.     Absent  suicidal ideation.    Reports compliance with medications as good .      Sleep: better.  Getting about 6 hours of sleep nightly.     Appetite: decreased     Caffeine use: recently cut back on caffeine use     Support:

## 2024-04-05 NOTE — TELEPHONE ENCOUNTER
Called and lvm letting pt know that her script for trazodone was sent to her pharmacy     Electronically signed by Peng Ricks on 4/5/2024 at 2:40 PM

## 2024-04-08 ENCOUNTER — TELEPHONE (OUTPATIENT)
Dept: PSYCHIATRY | Age: 34
End: 2024-04-08

## 2024-04-08 NOTE — TELEPHONE ENCOUNTER
Called pt to cancel/reschedule appt for today 04/12/24 with Clarisa Vines because the provider will not be in the office that day.    No answer and LVM.  Cancelled appt.  Sent [employer] message as well.    Electronically signed by Aster Multani MA on 4/8/2024 at 8:38 AM

## 2024-04-09 ENCOUNTER — TELEPHONE (OUTPATIENT)
Dept: PSYCHIATRY | Age: 34
End: 2024-04-09

## 2024-04-09 NOTE — TELEPHONE ENCOUNTER
Called patient to remind them of their appointment with Beny Yun in office for 04/10/24    -Pt confirmed    Electronically signed by Aster Multani MA on 4/9/2024 at 11:51 AM

## 2024-04-10 ENCOUNTER — SCHEDULED TELEPHONE ENCOUNTER (OUTPATIENT)
Dept: PSYCHIATRY | Age: 34
End: 2024-04-10
Payer: COMMERCIAL

## 2024-04-10 ENCOUNTER — TELEPHONE (OUTPATIENT)
Dept: PRIMARY CARE CLINIC | Age: 34
End: 2024-04-10

## 2024-04-10 ENCOUNTER — TELEPHONE (OUTPATIENT)
Dept: PSYCHIATRY | Age: 34
End: 2024-04-10

## 2024-04-10 DIAGNOSIS — F33.0 MILD EPISODE OF RECURRENT MAJOR DEPRESSIVE DISORDER (HCC): Primary | ICD-10-CM

## 2024-04-10 DIAGNOSIS — F41.1 GAD (GENERALIZED ANXIETY DISORDER): ICD-10-CM

## 2024-04-10 PROCEDURE — 99443 PR PHYS/QHP TELEPHONE EVALUATION 21-30 MIN: CPT | Performed by: NURSE PRACTITIONER

## 2024-04-10 RX ORDER — FLUOXETINE HYDROCHLORIDE 40 MG/1
40 CAPSULE ORAL DAILY
Qty: 30 CAPSULE | Refills: 2 | Status: SHIPPED | OUTPATIENT
Start: 2024-04-10

## 2024-04-10 ASSESSMENT — PATIENT HEALTH QUESTIONNAIRE - PHQ9
1. LITTLE INTEREST OR PLEASURE IN DOING THINGS: SEVERAL DAYS
3. TROUBLE FALLING OR STAYING ASLEEP: SEVERAL DAYS
5. POOR APPETITE OR OVEREATING: SEVERAL DAYS
SUM OF ALL RESPONSES TO PHQ QUESTIONS 1-9: 10
6. FEELING BAD ABOUT YOURSELF - OR THAT YOU ARE A FAILURE OR HAVE LET YOURSELF OR YOUR FAMILY DOWN: SEVERAL DAYS
10. IF YOU CHECKED OFF ANY PROBLEMS, HOW DIFFICULT HAVE THESE PROBLEMS MADE IT FOR YOU TO DO YOUR WORK, TAKE CARE OF THINGS AT HOME, OR GET ALONG WITH OTHER PEOPLE: SOMEWHAT DIFFICULT
4. FEELING TIRED OR HAVING LITTLE ENERGY: SEVERAL DAYS
SUM OF ALL RESPONSES TO PHQ QUESTIONS 1-9: 10
2. FEELING DOWN, DEPRESSED OR HOPELESS: SEVERAL DAYS
7. TROUBLE CONCENTRATING ON THINGS, SUCH AS READING THE NEWSPAPER OR WATCHING TELEVISION: MORE THAN HALF THE DAYS
SUM OF ALL RESPONSES TO PHQ QUESTIONS 1-9: 10
SUM OF ALL RESPONSES TO PHQ QUESTIONS 1-9: 10
8. MOVING OR SPEAKING SO SLOWLY THAT OTHER PEOPLE COULD HAVE NOTICED. OR THE OPPOSITE, BEING SO FIGETY OR RESTLESS THAT YOU HAVE BEEN MOVING AROUND A LOT MORE THAN USUAL: MORE THAN HALF THE DAYS
SUM OF ALL RESPONSES TO PHQ9 QUESTIONS 1 & 2: 2
9. THOUGHTS THAT YOU WOULD BE BETTER OFF DEAD, OR OF HURTING YOURSELF: NOT AT ALL

## 2024-04-10 ASSESSMENT — COLUMBIA-SUICIDE SEVERITY RATING SCALE - C-SSRS
2. HAVE YOU ACTUALLY HAD ANY THOUGHTS OF KILLING YOURSELF?: NO
6. HAVE YOU EVER DONE ANYTHING, STARTED TO DO ANYTHING, OR PREPARED TO DO ANYTHING TO END YOUR LIFE?: NO
1. WITHIN THE PAST MONTH, HAVE YOU WISHED YOU WERE DEAD OR WISHED YOU COULD GO TO SLEEP AND NOT WAKE UP?: NO

## 2024-04-10 NOTE — TELEPHONE ENCOUNTER
Pt was called for virtual appointment check in.    Electronically signed by Aster Multani MA on 4/10/2024 at 2:53 PM

## 2024-04-10 NOTE — TELEPHONE ENCOUNTER
Pt asking for Burn Cream. I tried to call her to ask what kind of Burn she has and if she is asking for Silvadene. Had to leave a message.

## 2024-04-10 NOTE — PROGRESS NOTES
Documentation:  I communicated with the patient and/or health care decision maker about see below.   Details of this discussion including any medical advice provided: see below    Total Time: minutes: 21-30 minutes    Flavia Hightower was evaluated through a synchronous (real-time) audio encounter. Patient identification was verified at the start of the visit. She (or guardian if applicable) is aware that this is a billable service, which includes applicable co-pays. This visit was conducted with the patient's (and/or legal guardian's) verbal consent. She has not had a related appointment within my department in the past 7 days or scheduled within the next 24 hours.   The patient was located at Home: [address] [place] [address].  The provider was located at Facility (Appt Dept): [facility] [address].  [address]  [place] [address].    Note: not billable if this call serves to triage the patient into an appointment for the relevant concern    Flavia Hightower is a 33 y.o. female evaluated via telephone on 4/10/2024 for No chief complaint on file.  .        Jimmy Yun, [facility]      4/10/24           Progress Note    Flavia Hightower 1990      No chief complaint on file.        Subjective:    Patient is a 33 y.o. female diagnosed with MDD DORCAS and presents today for follow-up.  Last seen in clinic on 11/30/23  and prior records were reviewed.    Last visit: switched to a phone visit today, she has a flat tire.  She states she has been doing really well lately.  She feels like her work performance has improved, she feels like it was related to sickness and grief.  She is sleeping well.    She does not want to make any adjustments at this time and just keep track of her symptoms before any medication changes.        Today : she is following up with therapy in this office.  She states overall she has been doing \"ok\" she has noticed some increased depression over the past couple of days.  She states she

## 2024-05-07 ENCOUNTER — TELEPHONE (OUTPATIENT)
Dept: PSYCHIATRY | Age: 34
End: 2024-05-07

## 2024-05-07 NOTE — TELEPHONE ENCOUNTER
Called patient to remind them of their appointment   -left voicemail, requesting a return call    Reminded patient to complete their visit pre-check/digital registration in [employer].    Electronically signed by Aster Multani MA on 5/7/2024 at 11:57 AM

## 2024-05-08 ENCOUNTER — TELEPHONE (OUTPATIENT)
Dept: PSYCHIATRY | Age: 34
End: 2024-05-08

## 2024-05-08 NOTE — TELEPHONE ENCOUNTER
Pt called to cancel/reschedule his appt for today 05/08/24 because of the weather.    Rescheduled for 05/09/24 @1:30.    Electronically signed by Aster Multani MA on 5/8/2024 at 4:29 PM     show

## 2024-05-09 ENCOUNTER — PATIENT MESSAGE (OUTPATIENT)
Dept: PRIMARY CARE CLINIC | Age: 34
End: 2024-05-09

## 2024-05-09 ENCOUNTER — OFFICE VISIT (OUTPATIENT)
Dept: PSYCHIATRY | Age: 34
End: 2024-05-09
Payer: COMMERCIAL

## 2024-05-09 VITALS
WEIGHT: 293 LBS | HEART RATE: 86 BPM | TEMPERATURE: 98 F | SYSTOLIC BLOOD PRESSURE: 113 MMHG | DIASTOLIC BLOOD PRESSURE: 72 MMHG | HEIGHT: 69 IN | OXYGEN SATURATION: 98 % | BODY MASS INDEX: 43.4 KG/M2

## 2024-05-09 DIAGNOSIS — F41.1 GAD (GENERALIZED ANXIETY DISORDER): ICD-10-CM

## 2024-05-09 DIAGNOSIS — F41.9 ANXIETY: ICD-10-CM

## 2024-05-09 DIAGNOSIS — F33.0 MILD EPISODE OF RECURRENT MAJOR DEPRESSIVE DISORDER (HCC): Primary | ICD-10-CM

## 2024-05-09 PROCEDURE — 3074F SYST BP LT 130 MM HG: CPT | Performed by: NURSE PRACTITIONER

## 2024-05-09 PROCEDURE — 99214 OFFICE O/P EST MOD 30 MIN: CPT | Performed by: NURSE PRACTITIONER

## 2024-05-09 PROCEDURE — 3078F DIAST BP <80 MM HG: CPT | Performed by: NURSE PRACTITIONER

## 2024-05-09 RX ORDER — FLUOXETINE HYDROCHLORIDE 20 MG/1
20 CAPSULE ORAL DAILY
Qty: 30 CAPSULE | Refills: 2 | Status: SHIPPED | OUTPATIENT
Start: 2024-05-09

## 2024-05-09 RX ORDER — DEXTROMETHORPHAN HYDROBROMIDE, BUPROPION HYDROCHLORIDE 105; 45 MG/1; MG/1
1 TABLET, MULTILAYER, EXTENDED RELEASE ORAL DAILY
Qty: 30 TABLET | Refills: 0 | Status: SHIPPED | COMMUNITY
Start: 2024-05-09

## 2024-05-09 RX ORDER — DEXTROMETHORPHAN HYDROBROMIDE, BUPROPION HYDROCHLORIDE 105; 45 MG/1; MG/1
1 TABLET, MULTILAYER, EXTENDED RELEASE ORAL DAILY
Qty: 30 TABLET | Refills: 1 | Status: SHIPPED | OUTPATIENT
Start: 2024-05-09

## 2024-05-09 RX ORDER — LORAZEPAM 1 MG/1
1 TABLET ORAL 2 TIMES DAILY PRN
Qty: 60 TABLET | Refills: 0 | Status: SHIPPED | OUTPATIENT
Start: 2024-05-09 | End: 2024-06-08

## 2024-05-09 ASSESSMENT — COLUMBIA-SUICIDE SEVERITY RATING SCALE - C-SSRS
6. HAVE YOU EVER DONE ANYTHING, STARTED TO DO ANYTHING, OR PREPARED TO DO ANYTHING TO END YOUR LIFE?: NO
2. HAVE YOU ACTUALLY HAD ANY THOUGHTS OF KILLING YOURSELF?: NO
1. WITHIN THE PAST MONTH, HAVE YOU WISHED YOU WERE DEAD OR WISHED YOU COULD GO TO SLEEP AND NOT WAKE UP?: NO

## 2024-05-09 ASSESSMENT — PATIENT HEALTH QUESTIONNAIRE - PHQ9
3. TROUBLE FALLING OR STAYING ASLEEP: NOT AT ALL
10. IF YOU CHECKED OFF ANY PROBLEMS, HOW DIFFICULT HAVE THESE PROBLEMS MADE IT FOR YOU TO DO YOUR WORK, TAKE CARE OF THINGS AT HOME, OR GET ALONG WITH OTHER PEOPLE: SOMEWHAT DIFFICULT
6. FEELING BAD ABOUT YOURSELF - OR THAT YOU ARE A FAILURE OR HAVE LET YOURSELF OR YOUR FAMILY DOWN: MORE THAN HALF THE DAYS
SUM OF ALL RESPONSES TO PHQ QUESTIONS 1-9: 11
2. FEELING DOWN, DEPRESSED OR HOPELESS: SEVERAL DAYS
SUM OF ALL RESPONSES TO PHQ9 QUESTIONS 1 & 2: 2
8. MOVING OR SPEAKING SO SLOWLY THAT OTHER PEOPLE COULD HAVE NOTICED. OR THE OPPOSITE, BEING SO FIGETY OR RESTLESS THAT YOU HAVE BEEN MOVING AROUND A LOT MORE THAN USUAL: MORE THAN HALF THE DAYS
7. TROUBLE CONCENTRATING ON THINGS, SUCH AS READING THE NEWSPAPER OR WATCHING TELEVISION: MORE THAN HALF THE DAYS
5. POOR APPETITE OR OVEREATING: SEVERAL DAYS
4. FEELING TIRED OR HAVING LITTLE ENERGY: MORE THAN HALF THE DAYS
SUM OF ALL RESPONSES TO PHQ QUESTIONS 1-9: 11
1. LITTLE INTEREST OR PLEASURE IN DOING THINGS: SEVERAL DAYS
SUM OF ALL RESPONSES TO PHQ QUESTIONS 1-9: 11
9. THOUGHTS THAT YOU WOULD BE BETTER OFF DEAD, OR OF HURTING YOURSELF: NOT AT ALL
SUM OF ALL RESPONSES TO PHQ QUESTIONS 1-9: 11

## 2024-05-09 NOTE — PROGRESS NOTES
5/9/24           Progress Note    Flavia LOYD Katja 1990      Chief Complaint   Patient presents with    Medication Check    Follow-up         Subjective:    Patient is a 33 y.o. female diagnosed with MDD DORCAS and presents today for follow-up.  Last seen in clinic on 4/10/24   and prior records were reviewed.    Last visit: she is following up with therapy in this office.  She states overall she has been doing \"ok\" she has noticed some increased depression over the past couple of days.  She states she has had some workplace fatigue as well.  We explored psychosocial stressors, she states nothing new has happened over the past week or so that would cause increased depression.    We discussed medication options such as maximizing her Prozac after discussion of medications patient has agreed to increase Prozac to 40 mg nightly.  She is bright, cooperative today she denies SI HI AVH she was encouraged to contact the office with questions or concerns and/or updates.  Will follow up in 1 month      Today : she states she has been feeling a little bit worse about herself and she has been feeling more tired.  She says socially she has been doing well and can not identify a reason for her depression.  Patient has been on Prozac for over 10 years and reports that with the latest increase in medication she has not noticed much of a benefit.  We discussed decreasing Prozac to 10 mg at this time as well as beginning Auvelity.  Patient is agreeable to medication adjustment she was encouraged to contact the office with questions concerns and/or updates as Auvelity or Prozac may need adjustments.  She is bright, cooperative today she denies SI HI AVH she denies side effects of medications.  We will follow-up in 1 month      Absent  suicidal ideation.    Reports compliance with medications as good .     Sleep: better.  Getting about 6 hours of sleep nightly.    Appetite: decreased     Caffeine use: recently cut back on

## 2024-05-24 ENCOUNTER — TELEPHONE (OUTPATIENT)
Dept: PSYCHIATRY | Age: 34
End: 2024-05-24

## 2024-05-24 NOTE — TELEPHONE ENCOUNTER
PA was approved for [facility] and let them know that the PA was approved     Called and lvm letting pt know that PA was approved     Electronically signed by Peng Ricks on 5/24/2024 at 4:09 PM

## 2024-05-29 ENCOUNTER — TELEPHONE (OUTPATIENT)
Dept: PSYCHIATRY | Age: 34
End: 2024-05-29

## 2024-05-29 NOTE — TELEPHONE ENCOUNTER
Addended by: Melida Bautista on: 9/29/2023 11:40 AM     Modules accepted: Orders Called pt to cancel/reschedule appt for 05/30/24 with Clarisa Vines because the provider is out the office sick.    No answer and LVM  Sent 3VRt message   Cancelled appt    Electronically signed by Aster Multani MA on 5/29/2024 at 4:25 PM

## 2024-06-03 ENCOUNTER — TELEPHONE (OUTPATIENT)
Dept: VASCULAR SURGERY | Facility: CLINIC | Age: 34
End: 2024-06-03
Payer: COMMERCIAL

## 2024-06-04 ENCOUNTER — OFFICE VISIT (OUTPATIENT)
Dept: VASCULAR SURGERY | Facility: CLINIC | Age: 34
End: 2024-06-04
Payer: COMMERCIAL

## 2024-06-04 VITALS
BODY MASS INDEX: 41.77 KG/M2 | DIASTOLIC BLOOD PRESSURE: 88 MMHG | HEART RATE: 95 BPM | SYSTOLIC BLOOD PRESSURE: 142 MMHG | HEIGHT: 69 IN | WEIGHT: 282 LBS | OXYGEN SATURATION: 97 %

## 2024-06-04 DIAGNOSIS — I87.323 CHRONIC VENOUS HYPERTENSION WITH INFLAMMATION INVOLVING BOTH SIDES: Primary | ICD-10-CM

## 2024-06-04 PROCEDURE — 99204 OFFICE O/P NEW MOD 45 MIN: CPT | Performed by: SURGERY

## 2024-06-04 RX ORDER — CARIPRAZINE 1.5 MG/1
1.5 CAPSULE, GELATIN COATED ORAL DAILY
COMMUNITY

## 2024-06-04 RX ORDER — DEXTROMETHORPHAN HYDROBROMIDE, BUPROPION HYDROCHLORIDE 105; 45 MG/1; MG/1
1 TABLET, MULTILAYER, EXTENDED RELEASE ORAL DAILY
COMMUNITY

## 2024-06-04 NOTE — LETTER
June 4, 2024     RYLEE Reyes  [address] [id number]    Patient: Mey Valle   YOB: 1990   Date of Visit: 6/4/2024     Dear RYLEE Reyes:       Thank you for referring Mey Valle to me for evaluation. Below are the relevant portions of my assessment and plan of care.    If you have questions, please do not hesitate to call me. I look forward to following Mey along with you.         Sincerely,        Aamir Nunez DO        CC: No Recipients    Aamir Nunez DO  06/04/24 1628  Sign when Signing Visit  06/04/2024      Lela Daugherty APRN  [address] [place] [address]    Mey Storeyner  1990    Chief Complaint   Patient presents with   • Varicose Veins     Having pain to lower legs right greater than left       Dear RYLEE Reyes:      HPI  I had the pleasure of seeing your patient Mey Valle in the office today.  Thank you kindly for this consultation.  As you recall, Mey Valle is a 33 y.o.  female who you are currently following for routine health maintenance. She does have complaints of swelling and pain, with itching to her right lower extremity, and some to left as well.  She does wear compression stockings on a daily basis.  She denies previous histroy of DVT.      Past Medical History:   Diagnosis Date   • Anxiety    • Depression        Past Surgical History:   Procedure Laterality Date   • CHOLECYSTECTOMY WITH INTRAOPERATIVE CHOLANGIOGRAM N/A 2/21/2019    Procedure: CHOLECYSTECTOMY LAPAROSCOPIC INTRAOPERATIVE CHOLANGIOGRAM;  Surgeon: Cabrera Enriquez MD;  [facility];  Service: General   • DILATATION AND CURETTAGE     • INNER EAR SURGERY         History reviewed. No pertinent family history.    Social History     Socioeconomic History   • Marital status:    Tobacco Use   • Smoking status: Former     Current packs/day: 0.00     Types: Cigarettes     Quit date: 2/21/2016  "    Years since quittin.2   • Smokeless tobacco: Never   Vaping Use   • Vaping status: Never Used   Substance and Sexual Activity   • Alcohol use: Not Currently   • Drug use: No   • Sexual activity: Defer       No Known Allergies    Current Outpatient Medications   Medication Instructions   • Auvelity  MG tablet controlled-release 1 tablet, Oral, Daily   • LORazepam (ATIVAN) 1 mg, Oral, 2 Times Daily   • ondansetron (ZOFRAN) 4 mg, Oral, Every 8 Hours PRN   • oxyCODONE-acetaminophen (PERCOCET) 5-325 MG per tablet 1 tablet, Every 6 Hours PRN   • Semaglutide(0.25 or 0.5MG/DOS) (OZEMPIC) 0.25 mg, Subcutaneous, Weekly   • traZODone (DESYREL) 50 mg, Oral, Nightly   • Vraylar 1.5 mg, Oral, Daily         Review of Systems   Constitutional: Negative.    HENT: Negative.     Eyes: Negative.    Respiratory: Negative.     Cardiovascular:  Positive for leg swelling.        Leg cramping   Gastrointestinal: Negative.    Endocrine: Negative.    Genitourinary: Negative.    Musculoskeletal: Negative.    Skin: Negative.    Allergic/Immunologic: Negative.    Neurological: Negative.    Hematological: Negative.    Psychiatric/Behavioral: Negative.         /88   Pulse 95   Ht 175.3 cm (69\")   Wt 128 kg (282 lb)   SpO2 97%   BMI 41.64 kg/m²   Physical Exam  Vitals and nursing note reviewed.   Constitutional:       Appearance: She is well-developed. She is obese.   HENT:      Head: Normocephalic and atraumatic.   Eyes:      General: No scleral icterus.     Pupils: Pupils are equal, round, and reactive to light.   Neck:      Thyroid: No thyromegaly.      Vascular: No carotid bruit or JVD.   Cardiovascular:      Rate and Rhythm: Normal rate and regular rhythm.      Pulses:           Carotid pulses are 2+ on the right side and 2+ on the left side.       Femoral pulses are 2+ on the right side and 2+ on the left side.       Popliteal pulses are 2+ on the right side and 2+ on the left side.        Dorsalis pedis pulses are 2+ " on the right side and 2+ on the left side.        Posterior tibial pulses are 2+ on the right side and 2+ on the left side.      Heart sounds: Normal heart sounds.      Comments: Varicose veins bilateral lower extremities, right greater than left  Pulmonary:      Effort: Pulmonary effort is normal.      Breath sounds: Normal breath sounds.   Abdominal:      General: Bowel sounds are normal. There is no distension or abdominal bruit.      Palpations: Abdomen is soft. There is no mass.      Tenderness: There is no abdominal tenderness.   Musculoskeletal:         General: Swelling present. Normal range of motion.      Cervical back: Neck supple.   Lymphadenopathy:      Cervical: No cervical adenopathy.   Skin:     General: Skin is warm and dry.   Neurological:      Mental Status: She is alert and oriented to person, place, and time.      Cranial Nerves: No cranial nerve deficit.      Sensory: No sensory deficit.   Psychiatric:         Mood and Affect: Mood normal.         Behavior: Behavior normal.         Thought Content: Thought content normal.         Judgment: Judgment normal.         No results found.    Patient Active Problem List   Diagnosis   • Calculus of gallbladder without cholecystitis without obstruction   • Cellulitis left axilla   • Left axillary abscess   • Class 3 severe obesity with body mass index (BMI) of 40.0 to 44.9 in adult   • Anxiety and depression         ICD-10-CM ICD-9-CM   1. Chronic venous hypertension with inflammation involving both sides  I87.323 459.32           Plan: After thoroughly evaluating Mey Valle, I believe the best course of action is to initially remain conservative from a vascular standpoint.  I would like her to continue wearing compression stockings in the 20-30 mm pressure gradient range.  I did instruct the patient on how to wear these on a daily basis.  I would like her to keep her legs elevated when she is not on them, and keep her legs well moisturized.  We  will see the patient back in 2 weeks with a venous valvular insufficiency study. If the testing does show significant venous reflux, the patient may be a great candidate for endovenous closure as the patient's symptoms have significantly impacted their activities of daily living.   The patient can continue taking their current medication regimen as previously planned.  This was all discussed in full with complete understanding.    Thank you for allowing me to participate in the care of your patient.  Please do not hesitate with any questions or concerns.  I will keep you aware of any further encounters with Mey Valle.        Sincerely yours,         Aamir Nunez, DO

## 2024-06-04 NOTE — PROGRESS NOTES
2024      Lela Daugherty APRN  [address] [place] [address]    Mey Valle  1990    Chief Complaint   Patient presents with    Varicose Veins     Having pain to lower legs right greater than left       Dear RYLEE Reyes:      HPI  I had the pleasure of seeing your patient Mey Valle in the office today.  Thank you kindly for this consultation.  As you recall, Mey Valle is a 33 y.o.  female who you are currently following for routine health maintenance. She does have complaints of swelling and pain, with itching to her right lower extremity, and some to left as well.  She does wear compression stockings on a daily basis.  She denies previous histroy of DVT.      Past Medical History:   Diagnosis Date    Anxiety     Depression        Past Surgical History:   Procedure Laterality Date    CHOLECYSTECTOMY WITH INTRAOPERATIVE CHOLANGIOGRAM N/A 2019    Procedure: CHOLECYSTECTOMY LAPAROSCOPIC INTRAOPERATIVE CHOLANGIOGRAM;  Surgeon: Cabrera Enriquez MD;  Location: [facility];  Service: General    DILATATION AND CURETTAGE      INNER EAR SURGERY         History reviewed. No pertinent family history.    Social History     Socioeconomic History    Marital status:    Tobacco Use    Smoking status: Former     Current packs/day: 0.00     Types: Cigarettes     Quit date: 2016     Years since quittin.2    Smokeless tobacco: Never   Vaping Use    Vaping status: Never Used   Substance and Sexual Activity    Alcohol use: Not Currently    Drug use: No    Sexual activity: Defer       No Known Allergies    Current Outpatient Medications   Medication Instructions    Auvelity  MG tablet controlled-release 1 tablet, Oral, Daily    LORazepam (ATIVAN) 1 mg, Oral, 2 Times Daily    ondansetron (ZOFRAN) 4 mg, Oral, Every 8 Hours PRN    oxyCODONE-acetaminophen (PERCOCET) 5-325 MG per tablet 1 tablet, Every 6 Hours PRN    Semaglutide(0.25 or 0.5MG/DOS) (OZEMPIC)  "0.25 mg, Subcutaneous, Weekly    traZODone (DESYREL) 50 mg, Oral, Nightly    Vraylar 1.5 mg, Oral, Daily         Review of Systems   Constitutional: Negative.    HENT: Negative.     Eyes: Negative.    Respiratory: Negative.     Cardiovascular:  Positive for leg swelling.        Leg cramping   Gastrointestinal: Negative.    Endocrine: Negative.    Genitourinary: Negative.    Musculoskeletal: Negative.    Skin: Negative.    Allergic/Immunologic: Negative.    Neurological: Negative.    Hematological: Negative.    Psychiatric/Behavioral: Negative.         /88   Pulse 95   Ht 175.3 cm (69\")   Wt 128 kg (282 lb)   SpO2 97%   BMI 41.64 kg/m²   Physical Exam  Vitals and nursing note reviewed.   Constitutional:       Appearance: She is well-developed. She is obese.   HENT:      Head: Normocephalic and atraumatic.   Eyes:      General: No scleral icterus.     Pupils: Pupils are equal, round, and reactive to light.   Neck:      Thyroid: No thyromegaly.      Vascular: No carotid bruit or JVD.   Cardiovascular:      Rate and Rhythm: Normal rate and regular rhythm.      Pulses:           Carotid pulses are 2+ on the right side and 2+ on the left side.       Femoral pulses are 2+ on the right side and 2+ on the left side.       Popliteal pulses are 2+ on the right side and 2+ on the left side.        Dorsalis pedis pulses are 2+ on the right side and 2+ on the left side.        Posterior tibial pulses are 2+ on the right side and 2+ on the left side.      Heart sounds: Normal heart sounds.      Comments: Varicose veins bilateral lower extremities, right greater than left  Pulmonary:      Effort: Pulmonary effort is normal.      Breath sounds: Normal breath sounds.   Abdominal:      General: Bowel sounds are normal. There is no distension or abdominal bruit.      Palpations: Abdomen is soft. There is no mass.      Tenderness: There is no abdominal tenderness.   Musculoskeletal:         General: Swelling present. Normal " range of motion.      Cervical back: Neck supple.   Lymphadenopathy:      Cervical: No cervical adenopathy.   Skin:     General: Skin is warm and dry.   Neurological:      Mental Status: She is alert and oriented to person, place, and time.      Cranial Nerves: No cranial nerve deficit.      Sensory: No sensory deficit.   Psychiatric:         Mood and Affect: Mood normal.         Behavior: Behavior normal.         Thought Content: Thought content normal.         Judgment: Judgment normal.         No results found.    Patient Active Problem List   Diagnosis    Calculus of gallbladder without cholecystitis without obstruction    Cellulitis left axilla    Left axillary abscess    Class 3 severe obesity with body mass index (BMI) of 40.0 to 44.9 in adult    Anxiety and depression         ICD-10-CM ICD-9-CM   1. Chronic venous hypertension with inflammation involving both sides  I87.323 459.32           Plan: After thoroughly evaluating Mey Valle, I believe the best course of action is to initially remain conservative from a vascular standpoint.  I would like her to continue wearing compression stockings in the 20-30 mm pressure gradient range.  I did instruct the patient on how to wear these on a daily basis.  I would like her to keep her legs elevated when she is not on them, and keep her legs well moisturized.  We will see the patient back in 2 weeks with a venous valvular insufficiency study. If the testing does show significant venous reflux, the patient may be a great candidate for endovenous closure as the patient's symptoms have significantly impacted their activities of daily living.   The patient can continue taking their current medication regimen as previously planned.  This was all discussed in full with complete understanding.    Thank you for allowing me to participate in the care of your patient.  Please do not hesitate with any questions or concerns.  I will keep you aware of any further  encounters with Mey Valle.        Sincerely yours,         Aamir Nunez, DO

## 2024-06-06 ENCOUNTER — PATIENT ROUNDING (BHMG ONLY) (OUTPATIENT)
Dept: VASCULAR SURGERY | Facility: CLINIC | Age: 34
End: 2024-06-06
Payer: COMMERCIAL

## 2024-06-06 NOTE — PROGRESS NOTES
June 6, 2024    Hello, may I speak with Mey Valle?    My name is ANGELIKA      I am  with [facility]  [address], [address]  [place] [address] [phone].    Before we get started may I verify your date of birth? 1990    I am calling to officially welcome you to our practice and ask about your recent visit. Is this a good time to talk? yes    Tell me about your visit with us. What things went well?  EVERYTHING WAS GREAT!       We're always looking for ways to make our patients' experiences even better. Do you have recommendations on ways we may improve?  no    Overall were you satisfied with your first visit to our practice? yes       I appreciate you taking the time to speak with me today. Is there anything else I can do for you? no      Thank you, and have a great day.

## 2024-06-11 ENCOUNTER — TELEPHONE (OUTPATIENT)
Dept: PSYCHIATRY | Age: 34
End: 2024-06-11

## 2024-06-11 NOTE — TELEPHONE ENCOUNTER
Called and confirmed appt with pt for 6/12 @ 2 PM    Electronically signed by Peng Ricks on 6/11/2024 at 12:07 PM

## 2024-06-12 ENCOUNTER — TELEPHONE (OUTPATIENT)
Dept: PSYCHIATRY | Age: 34
End: 2024-06-12

## 2024-06-12 ENCOUNTER — OFFICE VISIT (OUTPATIENT)
Dept: PSYCHIATRY | Age: 34
End: 2024-06-12
Payer: COMMERCIAL

## 2024-06-12 VITALS
TEMPERATURE: 98 F | SYSTOLIC BLOOD PRESSURE: 125 MMHG | BODY MASS INDEX: 43.4 KG/M2 | WEIGHT: 293 LBS | DIASTOLIC BLOOD PRESSURE: 82 MMHG | OXYGEN SATURATION: 97 % | HEIGHT: 69 IN | HEART RATE: 83 BPM

## 2024-06-12 DIAGNOSIS — F41.1 GAD (GENERALIZED ANXIETY DISORDER): ICD-10-CM

## 2024-06-12 DIAGNOSIS — F33.0 MILD EPISODE OF RECURRENT MAJOR DEPRESSIVE DISORDER (HCC): Primary | ICD-10-CM

## 2024-06-12 PROCEDURE — 3079F DIAST BP 80-89 MM HG: CPT | Performed by: NURSE PRACTITIONER

## 2024-06-12 PROCEDURE — 99213 OFFICE O/P EST LOW 20 MIN: CPT | Performed by: NURSE PRACTITIONER

## 2024-06-12 PROCEDURE — 3074F SYST BP LT 130 MM HG: CPT | Performed by: NURSE PRACTITIONER

## 2024-06-12 RX ORDER — DEXTROMETHORPHAN HYDROBROMIDE, BUPROPION HYDROCHLORIDE 105; 45 MG/1; MG/1
1 TABLET, MULTILAYER, EXTENDED RELEASE ORAL DAILY
Qty: 30 TABLET | Refills: 0 | Status: SHIPPED | OUTPATIENT
Start: 2024-06-12

## 2024-06-12 RX ORDER — DEXTROMETHORPHAN HYDROBROMIDE, BUPROPION HYDROCHLORIDE 105; 45 MG/1; MG/1
1 TABLET, MULTILAYER, EXTENDED RELEASE ORAL DAILY
Qty: 30 TABLET | Refills: 0 | Status: SHIPPED | COMMUNITY
Start: 2024-06-12

## 2024-06-12 ASSESSMENT — PATIENT HEALTH QUESTIONNAIRE - PHQ9
SUM OF ALL RESPONSES TO PHQ QUESTIONS 1-9: 4
5. POOR APPETITE OR OVEREATING: SEVERAL DAYS
SUM OF ALL RESPONSES TO PHQ QUESTIONS 1-9: 4
6. FEELING BAD ABOUT YOURSELF - OR THAT YOU ARE A FAILURE OR HAVE LET YOURSELF OR YOUR FAMILY DOWN: NOT AT ALL
9. THOUGHTS THAT YOU WOULD BE BETTER OFF DEAD, OR OF HURTING YOURSELF: NOT AT ALL
SUM OF ALL RESPONSES TO PHQ9 QUESTIONS 1 & 2: 1
SUM OF ALL RESPONSES TO PHQ QUESTIONS 1-9: 4
SUM OF ALL RESPONSES TO PHQ QUESTIONS 1-9: 4
1. LITTLE INTEREST OR PLEASURE IN DOING THINGS: SEVERAL DAYS
10. IF YOU CHECKED OFF ANY PROBLEMS, HOW DIFFICULT HAVE THESE PROBLEMS MADE IT FOR YOU TO DO YOUR WORK, TAKE CARE OF THINGS AT HOME, OR GET ALONG WITH OTHER PEOPLE: NOT DIFFICULT AT ALL
8. MOVING OR SPEAKING SO SLOWLY THAT OTHER PEOPLE COULD HAVE NOTICED. OR THE OPPOSITE, BEING SO FIGETY OR RESTLESS THAT YOU HAVE BEEN MOVING AROUND A LOT MORE THAN USUAL: NOT AT ALL
4. FEELING TIRED OR HAVING LITTLE ENERGY: SEVERAL DAYS
3. TROUBLE FALLING OR STAYING ASLEEP: SEVERAL DAYS
7. TROUBLE CONCENTRATING ON THINGS, SUCH AS READING THE NEWSPAPER OR WATCHING TELEVISION: NOT AT ALL
2. FEELING DOWN, DEPRESSED OR HOPELESS: NOT AT ALL

## 2024-06-12 NOTE — PROGRESS NOTES
12/08/2023    CREATININE 0.8 12/08/2023    GLUCOSE 93 12/08/2023    CALCIUM 9.6 12/08/2023    PROT 6.4 03/14/2013    BILITOT 0.3 12/08/2023    ALKPHOS 64 12/08/2023    AST 17 12/08/2023    ALT 19 12/08/2023    LABGLOM >60 12/08/2023     Lab Results   Component Value Date/Time     12/08/2023 10:36 AM    K 4.2 12/08/2023 10:36 AM     12/08/2023 10:36 AM    CO2 24 12/08/2023 10:36 AM    BUN 19 12/08/2023 10:36 AM    CREATININE 0.8 12/08/2023 10:36 AM    GLUCOSE 93 12/08/2023 10:36 AM    CALCIUM 9.6 12/08/2023 10:36 AM      Lab Results   Component Value Date    CHOL 130 03/14/2013     Lab Results   Component Value Date    TRIG 75 03/14/2013     Lab Results   Component Value Date    HDL 36 03/14/2013     No components found for: \"LDLCHOLESTEROL\", \"LDLCALC\"  No results found for: \"VLDL\"  No results found for: \"CHOLHDLRATIO\"  Lab Results   Component Value Date    LABA1C 4.9 12/08/2023     No results found for: \"EAG\"  Lab Results   Component Value Date    TSH 2.130 12/08/2023     Lab Results   Component Value Date    VITD25 47.9 12/08/2023     Lab Results   Component Value Date    YQEGRILS89 1708 (H) 12/08/2023      Lab Results   Component Value Date    FOLATE 8.0 06/29/2021        Assessment:   1. Mild episode of recurrent major depressive disorder (HCC)    2. DORCAS (generalized anxiety disorder)                  No evidence of acute suicidality, homicidality or psychosis observed.  Patient is psychiatrically stable    Plan:    1.   Continue   Trazodone 100 mg nightly for insomnia  Vraylar 1.5 mg daily for depression and mood   Auvelity 1 tablet daily for depression    Prescribed by other  Ativan 1 mg twice daily as needed for anxiety     The risks, benefits, side effects, indications, contraindications, and adverse effects of the medications have been discussed. Yes.  2. The pt has verbalized understanding and has capacity to give informed consent.  3. The [facility] report has been reviewed according to HB1

## 2024-06-19 ENCOUNTER — TELEPHONE (OUTPATIENT)
Dept: VASCULAR SURGERY | Facility: CLINIC | Age: 34
End: 2024-06-19
Payer: COMMERCIAL

## 2024-06-20 ENCOUNTER — HOSPITAL ENCOUNTER (OUTPATIENT)
Dept: ULTRASOUND IMAGING | Facility: HOSPITAL | Age: 34
Discharge: HOME | End: 2024-06-20
Payer: COMMERCIAL

## 2024-06-20 ENCOUNTER — OFFICE VISIT (OUTPATIENT)
Dept: PSYCHIATRY | Age: 34
End: 2024-06-20
Payer: COMMERCIAL

## 2024-06-20 ENCOUNTER — OFFICE VISIT (OUTPATIENT)
Dept: VASCULAR SURGERY | Facility: CLINIC | Age: 34
End: 2024-06-20
Payer: COMMERCIAL

## 2024-06-20 VITALS
DIASTOLIC BLOOD PRESSURE: 80 MMHG | HEART RATE: 81 BPM | SYSTOLIC BLOOD PRESSURE: 142 MMHG | BODY MASS INDEX: 41.62 KG/M2 | HEIGHT: 69 IN | WEIGHT: 281 LBS | OXYGEN SATURATION: 99 %

## 2024-06-20 DIAGNOSIS — F41.1 GAD (GENERALIZED ANXIETY DISORDER): ICD-10-CM

## 2024-06-20 DIAGNOSIS — I87.323 CHRONIC VENOUS HYPERTENSION WITH INFLAMMATION INVOLVING BOTH SIDES: ICD-10-CM

## 2024-06-20 DIAGNOSIS — Z01.818 PREOP TESTING: ICD-10-CM

## 2024-06-20 DIAGNOSIS — F33.0 MILD EPISODE OF RECURRENT MAJOR DEPRESSIVE DISORDER (HCC): Primary | ICD-10-CM

## 2024-06-20 DIAGNOSIS — I87.323 CHRONIC VENOUS HYPERTENSION WITH INFLAMMATION INVOLVING BOTH SIDES: Primary | ICD-10-CM

## 2024-06-20 PROCEDURE — 90837 PSYTX W PT 60 MINUTES: CPT

## 2024-06-20 PROCEDURE — 93970 EXTREMITY STUDY: CPT

## 2024-06-20 ASSESSMENT — ANXIETY QUESTIONNAIRES
7. FEELING AFRAID AS IF SOMETHING AWFUL MIGHT HAPPEN: NOT AT ALL
4. TROUBLE RELAXING: NOT AT ALL
6. BECOMING EASILY ANNOYED OR IRRITABLE: SEVERAL DAYS
3. WORRYING TOO MUCH ABOUT DIFFERENT THINGS: SEVERAL DAYS
1. FEELING NERVOUS, ANXIOUS, OR ON EDGE: NEARLY EVERY DAY
5. BEING SO RESTLESS THAT IT IS HARD TO SIT STILL: NOT AT ALL
2. NOT BEING ABLE TO STOP OR CONTROL WORRYING: SEVERAL DAYS
GAD7 TOTAL SCORE: 6
IF YOU CHECKED OFF ANY PROBLEMS ON THIS QUESTIONNAIRE, HOW DIFFICULT HAVE THESE PROBLEMS MADE IT FOR YOU TO DO YOUR WORK, TAKE CARE OF THINGS AT HOME, OR GET ALONG WITH OTHER PEOPLE: SOMEWHAT DIFFICULT

## 2024-06-20 ASSESSMENT — PATIENT HEALTH QUESTIONNAIRE - PHQ9
4. FEELING TIRED OR HAVING LITTLE ENERGY: NOT AT ALL
9. THOUGHTS THAT YOU WOULD BE BETTER OFF DEAD, OR OF HURTING YOURSELF: NOT AT ALL
SUM OF ALL RESPONSES TO PHQ QUESTIONS 1-9: 1
1. LITTLE INTEREST OR PLEASURE IN DOING THINGS: NOT AT ALL
6. FEELING BAD ABOUT YOURSELF - OR THAT YOU ARE A FAILURE OR HAVE LET YOURSELF OR YOUR FAMILY DOWN: NOT AT ALL
3. TROUBLE FALLING OR STAYING ASLEEP: NOT AT ALL
SUM OF ALL RESPONSES TO PHQ QUESTIONS 1-9: 1
10. IF YOU CHECKED OFF ANY PROBLEMS, HOW DIFFICULT HAVE THESE PROBLEMS MADE IT FOR YOU TO DO YOUR WORK, TAKE CARE OF THINGS AT HOME, OR GET ALONG WITH OTHER PEOPLE: NOT DIFFICULT AT ALL
SUM OF ALL RESPONSES TO PHQ QUESTIONS 1-9: 1
8. MOVING OR SPEAKING SO SLOWLY THAT OTHER PEOPLE COULD HAVE NOTICED. OR THE OPPOSITE, BEING SO FIGETY OR RESTLESS THAT YOU HAVE BEEN MOVING AROUND A LOT MORE THAN USUAL: NOT AT ALL
5. POOR APPETITE OR OVEREATING: SEVERAL DAYS
SUM OF ALL RESPONSES TO PHQ9 QUESTIONS 1 & 2: 0
7. TROUBLE CONCENTRATING ON THINGS, SUCH AS READING THE NEWSPAPER OR WATCHING TELEVISION: NOT AT ALL
2. FEELING DOWN, DEPRESSED OR HOPELESS: NOT AT ALL
SUM OF ALL RESPONSES TO PHQ QUESTIONS 1-9: 1

## 2024-06-20 NOTE — PROGRESS NOTES
2024        Lela Daugherty APRN  [address] [facility] [place] [address]      Mey Valle  1990    Chief Complaint   Patient presents with    Chronic venous hypertension with inflammation involving bot     No complaints no changes       Dear RYLEE Waters:      HPI  I had the pleasure of seeing your patient Mey Valle in the office today.  As you recall, Mey Valle is a 33 y.o.  female who you are currently following for routine health maintenance.  She is here today with testing and follow-up.  She had complaints of swelling and pain with itching to her right lower extremity and some to the left as well.  She does wear compression stockings on a daily basis.  She denies previous history of DVT.       Past Medical History:   Diagnosis Date    Anxiety     Depression        Past Surgical History:   Procedure Laterality Date    CHOLECYSTECTOMY WITH INTRAOPERATIVE CHOLANGIOGRAM N/A 2019    Procedure: CHOLECYSTECTOMY LAPAROSCOPIC INTRAOPERATIVE CHOLANGIOGRAM;  Surgeon: Cabrera Enriquez MD;  Location: [facility];  Service: General    DILATATION AND CURETTAGE      INNER EAR SURGERY         History reviewed. No pertinent family history.    Social History     Socioeconomic History    Marital status:    Tobacco Use    Smoking status: Former     Current packs/day: 0.00     Types: Cigarettes     Quit date: 2016     Years since quittin.3    Smokeless tobacco: Never   Vaping Use    Vaping status: Never Used   Substance and Sexual Activity    Alcohol use: Not Currently    Drug use: No    Sexual activity: Defer       No Known Allergies    Current Outpatient Medications   Medication Instructions    Auvelity  MG tablet controlled-release 1 tablet, Oral, Daily    LORazepam (ATIVAN) 1 mg, Oral, 2 Times Daily    ondansetron (ZOFRAN) 4 mg, Oral, Every 8 Hours PRN    oxyCODONE-acetaminophen (PERCOCET) 5-325 MG per tablet 1 tablet, Oral, Every 6 Hours PRN, Prescribed 3 day  "supply only    Semaglutide(0.25 or 0.5MG/DOS) (OZEMPIC) 0.25 mg, Subcutaneous, Weekly    traZODone (DESYREL) 50 mg, Oral, Nightly    Vraylar 1.5 mg, Oral, Daily       Review of Systems   Constitutional: Negative.  Negative for diaphoresis and fever.   HENT: Negative.     Eyes: Negative.    Respiratory: Negative.  Negative for shortness of breath and wheezing.    Cardiovascular:  Positive for leg swelling.        Leg cramping   Gastrointestinal: Negative.  Negative for abdominal pain.   Endocrine: Negative.    Genitourinary: Negative.    Musculoskeletal: Negative.  Negative for gait problem.   Skin: Negative.    Allergic/Immunologic: Negative.    Neurological: Negative.  Negative for dizziness and numbness.   Hematological: Negative.    Psychiatric/Behavioral: Negative.         /80   Pulse 81   Ht 175.3 cm (69\")   Wt 127 kg (281 lb)   SpO2 99%   BMI 41.50 kg/m²     Physical Exam  Vitals and nursing note reviewed.   Constitutional:       General: She is not in acute distress.     Appearance: Normal appearance. She is morbidly obese. She is not diaphoretic.   HENT:      Head: Normocephalic. No right periorbital erythema or left periorbital erythema.      Nose: Nose normal.   Eyes:      General: No scleral icterus.     Pupils: Pupils are equal.   Cardiovascular:      Rate and Rhythm: Normal rate and regular rhythm.      Pulses: Normal pulses.      Heart sounds: Normal heart sounds. No murmur heard.     Comments: Varicose veins located to bilateral lower extremities, right greater than left  Pulmonary:      Effort: Pulmonary effort is normal. No respiratory distress.      Breath sounds: Normal breath sounds.   Abdominal:      General: Bowel sounds are normal. There is no distension.      Palpations: Abdomen is soft.      Tenderness: There is no abdominal tenderness. There is no guarding.   Musculoskeletal:         General: No swelling or tenderness. Normal range of motion.      Cervical back: Normal range of " motion and neck supple.      Right lower leg: Edema present.      Left lower leg: Edema present.   Feet:      Right foot:      Skin integrity: Skin integrity normal.      Left foot:      Skin integrity: Skin integrity normal.   Skin:     General: Skin is warm and dry.      Findings: No erythema or rash.   Neurological:      General: No focal deficit present.      Mental Status: She is alert and oriented to person, place, and time. Mental status is at baseline.      Cranial Nerves: No cranial nerve deficit.      Gait: Gait normal.   Psychiatric:         Attention and Perception: Attention normal.         Mood and Affect: Mood normal.       No results found.    Patient Active Problem List   Diagnosis    Calculus of gallbladder without cholecystitis without obstruction    Cellulitis left axilla    Left axillary abscess    Class 3 severe obesity with body mass index (BMI) of 40.0 to 44.9 in adult    Anxiety and depression         ICD-10-CM ICD-9-CM   1. Chronic venous hypertension with inflammation involving both sides  I87.323 459.32         Plan: After thoroughly evaluating Mey Valle, I believe the best course of action is to proceed with right lower extremity radiofrequency ablation of greater saphenous and anterior accessory vein. Risks of radiofrequency ablation include, but are not limited to, bleeding, infection, vessel damage, nerve damage, DVT, phlebitis, and pulmonary embolus.  The patient understands these risks and wishes to proceed with procedure.  I would like her to continue wearing compression stockings and 20 to 30 mmHg range.  Elevate her legs when she is not on them, and keep her legs well moisturized.  She does state that in her line of work, with the excessive standing and walking required, it impacts her a great deal because she is oftentimes having to rest more often due to the pain.  She also states that it impedes her sleep, often waking her up. The patient can continue taking their  current medication regimen as previously planned.  This was all discussed in full with complete understanding.    Thank you for allowing me to participate in the care of your patient.  Please do not hesitate with any questions or concerns.  I will keep you aware of any further encounters with Mey Valle.        Sincerely yours,         RYLEE Lane

## 2024-06-20 NOTE — PROGRESS NOTES
Therapy Progress Note  Clarisa Vines M.S., [facility]  6/20/2024    Patient location  : [facility] location : [facility]      Total time spent: 60 minutes  This is patient's sixth  Therapy appointment.  Reason for Consult:  depression, anxiety, and stress  Referring Provider: No referring provider defined for this encounter.      Flavia Hightower ,a 33 y.o. female, for follow up visit.     Pt provided informed consent for the behavioral health program. Discussed with patient model of service to include the limits of confidentiality (i.e. abuse reporting, suicide intervention, etc.) and short-term intervention focused approach.  Discussed no show and late cancellation policy.  Pt indicated understanding.    S:  Pt reports things have gotten a lot better since last appointment. Pt reports overall things have been going well. Pt reports she is working this month, but will be off work next month. Pt reports they went on their trip to [place] and had a really good time. Pt reports they were able to relax and it was nice. Pt reports having a cruise planned for October that she is looking forward to. Pt denies panic attacks recently. Pt calm, cooperative, smiling, pleasant, and denies si, hi, avh. Completed assessments, see below. Therapist and pt discussed the different phases of forgiveness and what forgiveness means. Handouts on forgiveness discussed and provided. Pt receptive. Pt somewhat tearful when discussing forgiveness and her mom. Explored thoughts, emotions/feelings, and coping strategies. Therapist provided reflective listening/validation, support and encouragement. Pt reported she would like to continue the 2 weeks appointments and verbalized she would call sooner if needed. Pt denies Suicidal Ideations, Homicidal Ideation, Auditory Hallucinations, Visual Hallucinations, Tactical Hallucinations. Pt was informed that this provider will be leaving the practice August 2,

## 2024-06-21 ENCOUNTER — TELEPHONE (OUTPATIENT)
Dept: VASCULAR SURGERY | Facility: CLINIC | Age: 34
End: 2024-06-21
Payer: COMMERCIAL

## 2024-06-21 PROBLEM — Z01.818 PREOP TESTING: Status: ACTIVE | Noted: 2024-06-20

## 2024-06-21 PROBLEM — I87.323 CHRONIC VENOUS HYPERTENSION WITH INFLAMMATION INVOLVING BOTH SIDES: Status: ACTIVE | Noted: 2024-06-20

## 2024-06-21 NOTE — TELEPHONE ENCOUNTER
Pt expressed understanding of prework/surgery time and instruction for procedure scheduled 07/01/24 with Dr. Nunez.  NPO after midnight.  Pt to hold Ozempic shot on 06/28.

## 2024-06-28 ENCOUNTER — PRE-ADMISSION TESTING (OUTPATIENT)
Dept: PREADMISSION TESTING | Facility: HOSPITAL | Age: 34
End: 2024-06-28
Payer: COMMERCIAL

## 2024-06-28 ENCOUNTER — TELEPHONE (OUTPATIENT)
Age: 34
End: 2024-06-28
Payer: COMMERCIAL

## 2024-06-28 VITALS
RESPIRATION RATE: 18 BRPM | WEIGHT: 293 LBS | HEART RATE: 68 BPM | OXYGEN SATURATION: 99 % | SYSTOLIC BLOOD PRESSURE: 131 MMHG | HEIGHT: 68 IN | DIASTOLIC BLOOD PRESSURE: 69 MMHG | BODY MASS INDEX: 44.41 KG/M2

## 2024-06-28 DIAGNOSIS — I87.323 CHRONIC VENOUS HYPERTENSION WITH INFLAMMATION INVOLVING BOTH SIDES: ICD-10-CM

## 2024-06-28 DIAGNOSIS — Z01.818 PREOP TESTING: ICD-10-CM

## 2024-06-28 LAB
ANION GAP SERPL CALCULATED.3IONS-SCNC: 8 MMOL/L (ref 5–15)
BASOPHILS # BLD AUTO: 0.03 10*3/MM3 (ref 0–0.2)
BASOPHILS NFR BLD AUTO: 0.6 % (ref 0–1.5)
BUN SERPL-MCNC: 14 MG/DL (ref 6–20)
BUN/CREAT SERPL: 15.9 (ref 7–25)
CALCIUM SPEC-SCNC: 9.1 MG/DL (ref 8.6–10.5)
CHLORIDE SERPL-SCNC: 107 MMOL/L (ref 98–107)
CO2 SERPL-SCNC: 26 MMOL/L (ref 22–29)
CREAT SERPL-MCNC: 0.88 MG/DL (ref 0.57–1)
DEPRECATED RDW RBC AUTO: 42.5 FL (ref 37–54)
EGFRCR SERPLBLD CKD-EPI 2021: 89.7 ML/MIN/1.73
EOSINOPHIL # BLD AUTO: 0.4 10*3/MM3 (ref 0–0.4)
EOSINOPHIL NFR BLD AUTO: 8 % (ref 0.3–6.2)
ERYTHROCYTE [DISTWIDTH] IN BLOOD BY AUTOMATED COUNT: 12.9 % (ref 12.3–15.4)
GLUCOSE SERPL-MCNC: 84 MG/DL (ref 65–99)
HCT VFR BLD AUTO: 35.7 % (ref 34–46.6)
HGB BLD-MCNC: 11.5 G/DL (ref 12–15.9)
IMM GRANULOCYTES # BLD AUTO: 0.02 10*3/MM3 (ref 0–0.05)
IMM GRANULOCYTES NFR BLD AUTO: 0.4 % (ref 0–0.5)
LYMPHOCYTES # BLD AUTO: 1.59 10*3/MM3 (ref 0.7–3.1)
LYMPHOCYTES NFR BLD AUTO: 32 % (ref 19.6–45.3)
MCH RBC QN AUTO: 29.1 PG (ref 26.6–33)
MCHC RBC AUTO-ENTMCNC: 32.2 G/DL (ref 31.5–35.7)
MCV RBC AUTO: 90.4 FL (ref 79–97)
MONOCYTES # BLD AUTO: 0.33 10*3/MM3 (ref 0.1–0.9)
MONOCYTES NFR BLD AUTO: 6.6 % (ref 5–12)
NEUTROPHILS NFR BLD AUTO: 2.6 10*3/MM3 (ref 1.7–7)
NEUTROPHILS NFR BLD AUTO: 52.4 % (ref 42.7–76)
NRBC BLD AUTO-RTO: 0 /100 WBC (ref 0–0.2)
PLATELET # BLD AUTO: 244 10*3/MM3 (ref 140–450)
PMV BLD AUTO: 11.2 FL (ref 6–12)
POTASSIUM SERPL-SCNC: 3.9 MMOL/L (ref 3.5–5.2)
RBC # BLD AUTO: 3.95 10*6/MM3 (ref 3.77–5.28)
SODIUM SERPL-SCNC: 141 MMOL/L (ref 136–145)
WBC NRBC COR # BLD AUTO: 4.97 10*3/MM3 (ref 3.4–10.8)

## 2024-06-28 PROCEDURE — 80048 BASIC METABOLIC PNL TOTAL CA: CPT

## 2024-06-28 PROCEDURE — 93005 ELECTROCARDIOGRAM TRACING: CPT

## 2024-06-28 PROCEDURE — 85025 COMPLETE CBC W/AUTO DIFF WBC: CPT

## 2024-06-28 PROCEDURE — 93010 ELECTROCARDIOGRAM REPORT: CPT | Performed by: INTERNAL MEDICINE

## 2024-06-28 PROCEDURE — 36415 COLL VENOUS BLD VENIPUNCTURE: CPT

## 2024-06-28 RX ORDER — CHLORAL HYDRATE 500 MG
CAPSULE ORAL
COMMUNITY

## 2024-06-28 NOTE — DISCHARGE INSTRUCTIONS
Preparing for Surgery  Follow these instructions before the procedure:  Several days or weeks before your procedure  Medication(s) you need to stop   _1_week prior to surgery OZEMPIC      Ask your health care provider about:  Changing or stopping your regular medicines. This is especially important if you are taking diabetes medicines or blood thinners.  Taking medicines such as aspirin and ibuprofen. These medicines can thin your blood. Do not take these medicines unless your health care provider tells you to take them.  Taking over-the-counter medicines, vitamins, herbs, and supplements.    Contact your surgeon if you:  Develop a fever of more than 100.4°F (38°C) or other feelings of illness during the 48 hours before your surgery.  Have symptoms that get worse.  Have questions or concerns about your surgery.  If you are going home the same day of your surgery you will need to arrange for a responsible adult, age 18 years old or older, to drive you home from the hospital and stay with you for 24 hours. Verification of the  will be made prior to any procedure requiring sedation. You may not go home in a taxi or any form of public transportation by yourself.     Day before your procedure  24 hours before your procedure DO NOT drink alcoholic beverages or smoke.  You may be asked to shower with a germ-killing soap.  Day of your procedure   You may take the following medication(s) the morning of surgery with a sip of water: ATIVAN, PERCOCET      8 hours before your procedure STOP all food, any dairy products, and full liquids. This includes hard candy, chewing gum or mints. This is extremely important to prevent serious complications.     Up to 2 hours before your scheduled arrival time, you may have clear liquids no cream, powder, or pulp of any kind. Safe options are water, black coffee, plain tea, soda, Gatorade/Powerade, clear broth, apple juice.    2 hours before your scheduled arrival time, STOP drinking  clear liquids.    You may need to take another shower with a germ-killing soap before you leave home in the morning. Do not use perfumes, colognes, or body lotions.  Wear comfortable loose-fitting clothing.  Remove all jewelry including body piercing and rings, dark colored nail polish, and make up prior to arrival at the hospital. Leave all valuables at home.   Bring your hearing aids if you rely on them.  Do not wear contact lenses. If you wear eyeglasses remember to bring a case to store them in while you are in surgery.  Do not use denture adhesives since you will be asked to remove them during your surgery.    You do not need to bring your home medications into the hospital.   Bring your sleep apnea device with you on the day of your surgery (if this applies to you).  If you wear portable oxygen, bring it with you.   If you are staying overnight, you may bring a bag of items you may need such as slippers, robe and a change of clothes for your discharge. You may want to leave these items in the car until you are ready for them since your family will take your belongings when you leave the pre-operative area.  Arrive at the hospital as scheduled by the office. You will be asked to arrive 2 hours prior to your surgery time in order to prepare for your procedure.  When you arrive at the hospital  Go to the registration desk located at the main entrance of the hospital.  After registration is completed, you will be given a beeper and a sticker sheet. Take the stickers to Outpatient Surgery and place in the tray at the end of the desk to notify the staff that you have arrived and registered.   Return to the lobby to wait. You are not always called back according to the time of arrival but rather the time your doctor will be ready.  When your beeper lights up and vibrates proceed through the double doors, under the stairs, and a member of the Outpatient Surgery staff will escort you to your preoperative room.        How to Use Chlorhexidine Before Surgery  Chlorhexidine gluconate (CHG) is a germ-killing (antiseptic) solution that is used to clean the skin. It can get rid of the bacteria that normally live on the skin and can keep them away for about 24 hours. To clean your skin with CHG, you may be given:  A CHG solution to use in the shower or as part of a sponge bath.  A prepackaged cloth that contains CHG.  Cleaning your skin with CHG may help lower the risk for infection:  While you are staying in the intensive care unit of the hospital.  If you have a vascular access, such as a central line, to provide short-term or long-term access to your veins.  If you have a catheter to drain urine from your bladder.  If you are on a ventilator. A ventilator is a machine that helps you breathe by moving air in and out of your lungs.  After surgery.  What are the risks?  Risks of using CHG include:  A skin reaction.  Hearing loss, if CHG gets in your ears and you have a perforated eardrum.  Eye injury, if CHG gets in your eyes and is not rinsed out.  The CHG product catching fire.  Make sure that you avoid smoking and flames after applying CHG to your skin.  Do not use CHG:  If you have a chlorhexidine allergy or have previously reacted to chlorhexidine.  On babies younger than 2 months of age.  How to use CHG solution  Use CHG only as told by your health care provider, and follow the instructions on the label.  Use the full amount of CHG as directed. Usually, this is one bottle.  During a shower    Follow these steps when using CHG solution during a shower (unless your health care provider gives you different instructions):  Start the shower.  Use your normal soap and shampoo to wash your face and hair.  Turn off the shower or move out of the shower stream.  Pour the CHG onto a clean washcloth. Do not use any type of brush or rough-edged sponge.  Starting at your neck, lather your body down to your toes. Make sure you follow  these instructions:  If you will be having surgery, pay special attention to the part of your body where you will be having surgery. Scrub this area for at least 1 minute.  Do not use CHG on your head or face. If the solution gets into your ears or eyes, rinse them well with water.  Avoid your genital area.  Avoid any areas of skin that have broken skin, cuts, or scrapes.  Scrub your back and under your arms. Make sure to wash skin folds.  Let the lather sit on your skin for 1-2 minutes or as long as told by your health care provider.  Thoroughly rinse your entire body in the shower. Make sure that all body creases and crevices are rinsed well.  Dry off with a clean towel. Do not put any substances on your body afterward--such as powder, lotion, or perfume--unless you are told to do so by your health care provider. Only use lotions that are recommended by the .  Put on clean clothes or pajamas.  If it is the night before your surgery, sleep in clean sheets.     During a sponge bath  Follow these steps when using CHG solution during a sponge bath (unless your health care provider gives you different instructions):  Use your normal soap and shampoo to wash your face and hair.  Pour the CHG onto a clean washcloth.  Starting at your neck, lather your body down to your toes. Make sure you follow these instructions:  If you will be having surgery, pay special attention to the part of your body where you will be having surgery. Scrub this area for at least 1 minute.  Do not use CHG on your head or face. If the solution gets into your ears or eyes, rinse them well with water.  Avoid your genital area.  Avoid any areas of skin that have broken skin, cuts, or scrapes.  Scrub your back and under your arms. Make sure to wash skin folds.  Let the lather sit on your skin for 1-2 minutes or as long as told by your health care provider.  Using a different clean, wet washcloth, thoroughly rinse your entire body. Make  sure that all body creases and crevices are rinsed well.  Dry off with a clean towel. Do not put any substances on your body afterward--such as powder, lotion, or perfume--unless you are told to do so by your health care provider. Only use lotions that are recommended by the .  Put on clean clothes or pajamas.  If it is the night before your surgery, sleep in clean sheets.  How to use CHG prepackaged cloths  Only use CHG cloths as told by your health care provider, and follow the instructions on the label.  Use the CHG cloth on clean, dry skin.  Do not use the CHG cloth on your head or face unless your health care provider tells you to.  When washing with the CHG cloth:  Avoid your genital area.  Avoid any areas of skin that have broken skin, cuts, or scrapes.  Before surgery    Follow these steps when using a CHG cloth to clean before surgery (unless your health care provider gives you different instructions):  Using the CHG cloth, vigorously scrub the part of your body where you will be having surgery. Scrub using a back-and-forth motion for 3 minutes. The area on your body should be completely wet with CHG when you are done scrubbing.  Do not rinse. Discard the cloth and let the area air-dry. Do not put any substances on the area afterward, such as powder, lotion, or perfume.  Put on clean clothes or pajamas.  If it is the night before your surgery, sleep in clean sheets.     For general bathing  Follow these steps when using CHG cloths for general bathing (unless your health care provider gives you different instructions).  Use a separate CHG cloth for each area of your body. Make sure you wash between any folds of skin and between your fingers and toes. Wash your body in the following order, switching to a new cloth after each step:  The front of your neck, shoulders, and chest.  Both of your arms, under your arms, and your hands.  Your stomach and groin area, avoiding the genitals.  Your right leg  and foot.  Your left leg and foot.  The back of your neck, your back, and your buttocks.  Do not rinse. Discard the cloth and let the area air-dry. Do not put any substances on your body afterward--such as powder, lotion, or perfume--unless you are told to do so by your health care provider. Only use lotions that are recommended by the .  Put on clean clothes or pajamas.  Contact a health care provider if:  Your skin gets irritated after scrubbing.  You have questions about using your solution or cloth.  You swallow any chlorhexidine. Call your local poison control center (1-461.577.5240 in the [place]  Get help right away if:  Your eyes itch badly, or they become very red or swollen.  Your skin itches badly and is red or swollen.  Your hearing changes.  You have trouble seeing.  You have swelling or tingling in your mouth or throat.  You have trouble breathing.  These symptoms may represent a serious problem that is an emergency. Do not wait to see if the symptoms will go away. Get medical help right away. Call your local emergency services (324 in the [place] Do not drive yourself to the hospital.  Summary  Chlorhexidine gluconate (CHG) is a germ-killing (antiseptic) solution that is used to clean the skin. Cleaning your skin with CHG may help to lower your risk for infection.  You may be given CHG to use for bathing. It may be in a bottle or in a prepackaged cloth to use on your skin. Carefully follow your health care provider's instructions and the instructions on the product label.  Do not use CHG if you have a chlorhexidine allergy.  Contact your health care provider if your skin gets irritated after scrubbing.  This information is not intended to replace advice given to you by your health care provider. Make sure you discuss any questions you have with your health care provider.  Document Revised: 04/17/2023 Document Reviewed: 02/28/2022  Elsevier Patient Education © 2023 [employer]

## 2024-06-28 NOTE — TELEPHONE ENCOUNTER
Called and spoke with patient about her surgery on 07/01/2024. Her arrival time is 7:00 am and patient is to NPO after midnight. Patient verbalized understanding and will call with any questions or concerns.

## 2024-06-29 LAB
QT INTERVAL: 414 MS
QTC INTERVAL: 427 MS

## 2024-07-01 ENCOUNTER — APPOINTMENT (OUTPATIENT)
Dept: ULTRASOUND IMAGING | Facility: HOSPITAL | Age: 34
End: 2024-07-01
Payer: COMMERCIAL

## 2024-07-01 ENCOUNTER — ANESTHESIA (OUTPATIENT)
Dept: PERIOP | Facility: HOSPITAL | Age: 34
End: 2024-07-01
Payer: COMMERCIAL

## 2024-07-01 ENCOUNTER — ANESTHESIA EVENT (OUTPATIENT)
Dept: PERIOP | Facility: HOSPITAL | Age: 34
End: 2024-07-01
Payer: COMMERCIAL

## 2024-07-01 ENCOUNTER — HOSPITAL ENCOUNTER (OUTPATIENT)
Facility: HOSPITAL | Age: 34
Setting detail: HOSPITAL OUTPATIENT SURGERY
Discharge: HOME | End: 2024-07-01
Attending: SURGERY | Admitting: SURGERY
Payer: COMMERCIAL

## 2024-07-01 VITALS
RESPIRATION RATE: 16 BRPM | SYSTOLIC BLOOD PRESSURE: 149 MMHG | HEART RATE: 66 BPM | DIASTOLIC BLOOD PRESSURE: 73 MMHG | TEMPERATURE: 96.9 F | OXYGEN SATURATION: 98 %

## 2024-07-01 DIAGNOSIS — I87.323 CHRONIC VENOUS HYPERTENSION WITH INFLAMMATION INVOLVING BOTH SIDES: ICD-10-CM

## 2024-07-01 LAB — B-HCG UR QL: NEGATIVE

## 2024-07-01 PROCEDURE — C1894 INTRO/SHEATH, NON-LASER: HCPCS | Performed by: SURGERY

## 2024-07-01 PROCEDURE — 25010000002 MIDAZOLAM PER 1 MG: Performed by: ANESTHESIOLOGY

## 2024-07-01 PROCEDURE — 25810000003 SODIUM CHLORIDE PER 500 ML: Performed by: SURGERY

## 2024-07-01 PROCEDURE — C1888 ENDOVAS NON-CARDIAC ABL CATH: HCPCS | Performed by: SURGERY

## 2024-07-01 PROCEDURE — 76937 US GUIDE VASCULAR ACCESS: CPT

## 2024-07-01 PROCEDURE — 25010000002 PROPOFOL 1000 MG/100ML EMULSION

## 2024-07-01 PROCEDURE — 25010000002 DEXAMETHASONE PER 1 MG: Performed by: ANESTHESIOLOGY

## 2024-07-01 PROCEDURE — 25810000003 LACTATED RINGERS PER 1000 ML: Performed by: SURGERY

## 2024-07-01 PROCEDURE — 36475 ENDOVENOUS RF 1ST VEIN: CPT | Performed by: SURGERY

## 2024-07-01 PROCEDURE — 81025 URINE PREGNANCY TEST: CPT | Performed by: SURGERY

## 2024-07-01 PROCEDURE — 25010000002 CEFAZOLIN PER 500 MG: Performed by: NURSE PRACTITIONER

## 2024-07-01 RX ORDER — SODIUM CHLORIDE, SODIUM LACTATE, POTASSIUM CHLORIDE, CALCIUM CHLORIDE 600; 310; 30; 20 MG/100ML; MG/100ML; MG/100ML; MG/100ML
100 INJECTION, SOLUTION INTRAVENOUS CONTINUOUS
Status: DISCONTINUED | OUTPATIENT
Start: 2024-07-01 | End: 2024-07-01 | Stop reason: HOSPADM

## 2024-07-01 RX ORDER — FENTANYL CITRATE 50 UG/ML
50 INJECTION, SOLUTION INTRAMUSCULAR; INTRAVENOUS
Status: DISCONTINUED | OUTPATIENT
Start: 2024-07-01 | End: 2024-07-01 | Stop reason: HOSPADM

## 2024-07-01 RX ORDER — SODIUM CHLORIDE 9 MG/ML
INJECTION, SOLUTION INTRAVENOUS AS NEEDED
Status: DISCONTINUED | OUTPATIENT
Start: 2024-07-01 | End: 2024-07-01 | Stop reason: HOSPADM

## 2024-07-01 RX ORDER — DROPERIDOL 2.5 MG/ML
0.62 INJECTION, SOLUTION INTRAMUSCULAR; INTRAVENOUS ONCE AS NEEDED
Status: DISCONTINUED | OUTPATIENT
Start: 2024-07-01 | End: 2024-07-01 | Stop reason: HOSPADM

## 2024-07-01 RX ORDER — OXYCODONE HYDROCHLORIDE AND ACETAMINOPHEN 5; 325 MG/1; MG/1
1 TABLET ORAL EVERY 6 HOURS PRN
Qty: 10 TABLET | Refills: 0 | Status: SHIPPED | OUTPATIENT
Start: 2024-07-01

## 2024-07-01 RX ORDER — FLUMAZENIL 0.1 MG/ML
0.2 INJECTION INTRAVENOUS AS NEEDED
Status: DISCONTINUED | OUTPATIENT
Start: 2024-07-01 | End: 2024-07-01 | Stop reason: HOSPADM

## 2024-07-01 RX ORDER — LIDOCAINE HYDROCHLORIDE 20 MG/ML
INJECTION, SOLUTION EPIDURAL; INFILTRATION; INTRACAUDAL; PERINEURAL AS NEEDED
Status: DISCONTINUED | OUTPATIENT
Start: 2024-07-01 | End: 2024-07-01 | Stop reason: SURG

## 2024-07-01 RX ORDER — HYDROCODONE BITARTRATE AND ACETAMINOPHEN 5; 325 MG/1; MG/1
1 TABLET ORAL EVERY 4 HOURS PRN
Status: DISCONTINUED | OUTPATIENT
Start: 2024-07-01 | End: 2024-07-01 | Stop reason: HOSPADM

## 2024-07-01 RX ORDER — ONDANSETRON 2 MG/ML
4 INJECTION INTRAMUSCULAR; INTRAVENOUS ONCE AS NEEDED
Status: DISCONTINUED | OUTPATIENT
Start: 2024-07-01 | End: 2024-07-01 | Stop reason: HOSPADM

## 2024-07-01 RX ORDER — MIDAZOLAM HYDROCHLORIDE 1 MG/ML
2 INJECTION INTRAMUSCULAR; INTRAVENOUS
Status: DISCONTINUED | OUTPATIENT
Start: 2024-07-01 | End: 2024-07-01 | Stop reason: HOSPADM

## 2024-07-01 RX ORDER — DEXAMETHASONE SODIUM PHOSPHATE 4 MG/ML
4 INJECTION, SOLUTION INTRA-ARTICULAR; INTRALESIONAL; INTRAMUSCULAR; INTRAVENOUS; SOFT TISSUE ONCE AS NEEDED
Status: COMPLETED | OUTPATIENT
Start: 2024-07-01 | End: 2024-07-01

## 2024-07-01 RX ORDER — SODIUM CHLORIDE, SODIUM LACTATE, POTASSIUM CHLORIDE, CALCIUM CHLORIDE 600; 310; 30; 20 MG/100ML; MG/100ML; MG/100ML; MG/100ML
1000 INJECTION, SOLUTION INTRAVENOUS CONTINUOUS
Status: DISCONTINUED | OUTPATIENT
Start: 2024-07-01 | End: 2024-07-01 | Stop reason: HOSPADM

## 2024-07-01 RX ORDER — SODIUM CHLORIDE 0.9 % (FLUSH) 0.9 %
3 SYRINGE (ML) INJECTION AS NEEDED
Status: DISCONTINUED | OUTPATIENT
Start: 2024-07-01 | End: 2024-07-01 | Stop reason: HOSPADM

## 2024-07-01 RX ORDER — LABETALOL HYDROCHLORIDE 5 MG/ML
5 INJECTION, SOLUTION INTRAVENOUS
Status: DISCONTINUED | OUTPATIENT
Start: 2024-07-01 | End: 2024-07-01 | Stop reason: HOSPADM

## 2024-07-01 RX ORDER — SODIUM CHLORIDE 0.9 % (FLUSH) 0.9 %
3 SYRINGE (ML) INJECTION EVERY 12 HOURS SCHEDULED
Status: DISCONTINUED | OUTPATIENT
Start: 2024-07-01 | End: 2024-07-01 | Stop reason: HOSPADM

## 2024-07-01 RX ORDER — SODIUM CHLORIDE 9 MG/ML
40 INJECTION, SOLUTION INTRAVENOUS AS NEEDED
Status: DISCONTINUED | OUTPATIENT
Start: 2024-07-01 | End: 2024-07-01 | Stop reason: HOSPADM

## 2024-07-01 RX ORDER — SODIUM CHLORIDE 0.9 % (FLUSH) 0.9 %
3-10 SYRINGE (ML) INJECTION AS NEEDED
Status: DISCONTINUED | OUTPATIENT
Start: 2024-07-01 | End: 2024-07-01 | Stop reason: HOSPADM

## 2024-07-01 RX ORDER — HYDROCODONE BITARTRATE AND ACETAMINOPHEN 10; 325 MG/1; MG/1
1 TABLET ORAL EVERY 4 HOURS PRN
Status: DISCONTINUED | OUTPATIENT
Start: 2024-07-01 | End: 2024-07-01 | Stop reason: HOSPADM

## 2024-07-01 RX ORDER — PROPOFOL 10 MG/ML
INJECTION, EMULSION INTRAVENOUS AS NEEDED
Status: DISCONTINUED | OUTPATIENT
Start: 2024-07-01 | End: 2024-07-01 | Stop reason: SURG

## 2024-07-01 RX ORDER — LIDOCAINE HYDROCHLORIDE 10 MG/ML
0.5 INJECTION, SOLUTION EPIDURAL; INFILTRATION; INTRACAUDAL; PERINEURAL ONCE AS NEEDED
Status: DISCONTINUED | OUTPATIENT
Start: 2024-07-01 | End: 2024-07-01 | Stop reason: HOSPADM

## 2024-07-01 RX ORDER — NALOXONE HCL 0.4 MG/ML
0.4 VIAL (ML) INJECTION AS NEEDED
Status: DISCONTINUED | OUTPATIENT
Start: 2024-07-01 | End: 2024-07-01 | Stop reason: HOSPADM

## 2024-07-01 RX ORDER — OXYCODONE HYDROCHLORIDE AND ACETAMINOPHEN 5; 325 MG/1; MG/1
1 TABLET ORAL ONCE AS NEEDED
Status: DISCONTINUED | OUTPATIENT
Start: 2024-07-01 | End: 2024-07-01 | Stop reason: HOSPADM

## 2024-07-01 RX ADMIN — CEFAZOLIN 2000 MG: 2 INJECTION, POWDER, FOR SOLUTION INTRAMUSCULAR; INTRAVENOUS at 09:54

## 2024-07-01 RX ADMIN — PROPOFOL INJECTABLE EMULSION 30 MG: 10 INJECTION, EMULSION INTRAVENOUS at 09:57

## 2024-07-01 RX ADMIN — PROPOFOL INJECTABLE EMULSION 50 MG: 10 INJECTION, EMULSION INTRAVENOUS at 09:55

## 2024-07-01 RX ADMIN — LIDOCAINE HYDROCHLORIDE 100 MG: 20 INJECTION, SOLUTION EPIDURAL; INFILTRATION; INTRACAUDAL; PERINEURAL at 09:55

## 2024-07-01 RX ADMIN — DEXAMETHASONE SODIUM PHOSPHATE 4 MG: 4 INJECTION INTRA-ARTICULAR; INTRALESIONAL; INTRAMUSCULAR; INTRAVENOUS; SOFT TISSUE at 09:35

## 2024-07-01 RX ADMIN — OXYCODONE HYDROCHLORIDE AND ACETAMINOPHEN 1 TABLET: 5; 325 TABLET ORAL at 10:54

## 2024-07-01 RX ADMIN — SODIUM CHLORIDE, POTASSIUM CHLORIDE, SODIUM LACTATE AND CALCIUM CHLORIDE 1000 ML: 600; 310; 30; 20 INJECTION, SOLUTION INTRAVENOUS at 08:18

## 2024-07-01 RX ADMIN — MIDAZOLAM 2 MG: 1 INJECTION INTRAMUSCULAR; INTRAVENOUS at 09:35

## 2024-07-01 RX ADMIN — PROPOFOL INJECTABLE EMULSION 150 MCG/KG/MIN: 10 INJECTION, EMULSION INTRAVENOUS at 09:56

## 2024-07-01 NOTE — OP NOTE
Mey Valle  7/1/2024     PREOPERATIVE DIAGNOSIS: Chronic venous hypertension with inflammation involving both sides [I87.323]  Preop testing [Z01.818]     POSTOPERATIVE DIAGNOSIS: Post-Op Diagnosis Codes:     * Chronic venous hypertension with inflammation involving both sides [I87.323]     * Preop testing [Z01.818]     PROCEDURE PERFORMED:   1.  Ultrasound-guided cannulation of the right lower extremity greater saphenous and anterior accessory saphenous veins  2.  Radiofrequency ablation of the right lower extremity greater saphenous and anterior accessory saphenous veins     SURGEON: Aamir Nunez DO      ANESTHESIA: MAC    PREPARATION: Routine.    STAFF: Circulator: Nicholas Calderon RN  Scrub Person: Ev Lugo    Estimated Blood Loss: minimal    SPECIMENS: None    COMPLICATIONS: None    INDICATIONS: Mey Valle is a 33 y.o. female who has complaints of swelling and pain with itching to her right lower extremity and some to the left as well. She does wear compression stockings on a daily basis. She denies previous history of DVT. The indications, risks, and possible complications of the procedure were explained to the patient, who voiced understanding and wished to proceed with surgery.     PROCEDURE IN DETAIL:   The patient was taken to the operating room and placed on the operating table in a supine position. After MAC anesthesia was obtained, the right lower extremity was prepped and draped in a sterile manner.  Under ultrasound guidance and using a micropuncture technique the right lower extremity greater saphenous and anterior accessory saphenous veins were cannulated and a microwire was placed.  This was confirmed under ultrasound.  A small stab incision was made with 11 blade and a 7 Arabic sheath was placed.  The patient was placed in Trendelenburg position and the saphenofemoral junction was identified under ultrasound.  The catheter was placed up to the junction and pulled back 3 cm  and marked.  Next, tumescent fluid was instilled along the entire length of both veins under ultrasound guidance.  Once sufficient tumescent fluid was placed radiofrequency ablation had commenced.  There was a total of 9/4 RF cycles for a total treatment length of 52.5/20 cm for a total treatment time of 3 minutes / 1 minute and 20 seconds.  There was an average of 11/15 W at an average temperature of 120/119°C.  Upon completion of the ablation the catheter and sheath were removed.  Direct pressure was held for an additional 5-10 minutes to ensure hemostasis.  An Ace wrap was placed from the toes to the groin.  Sterile dressings were applied. The patient tolerated the procedure well. Sponge and needle counts were correct. The patient was then awakened and transferred to the outpatient center in stable condition.  Aamir Nunez,   Date: 7/1/2024 Time: 10:01 CDT    CC:RYLEE Waters

## 2024-07-01 NOTE — H&P
2024             Lela Daugherty APRN  [address] [place] [address]        Mey Valle  1990          Chief Complaint   Patient presents with    Chronic venous hypertension with inflammation involving bot       No complaints no changes         Dear RYLEE Waters:        HPI  I had the pleasure of seeing your patient Mey Valle in the office today.  As you recall, Mey Valle is a 33 y.o.  female who you are currently following for routine health maintenance.  She is here today with testing and follow-up.  She had complaints of swelling and pain with itching to her right lower extremity and some to the left as well.  She does wear compression stockings on a daily basis.  She denies previous history of DVT.        Past Medical History:   Diagnosis Date    Anxiety     Depression     Seizures     as a child     Past Surgical History:   Procedure Laterality Date    CHOLECYSTECTOMY WITH INTRAOPERATIVE CHOLANGIOGRAM N/A 2019    Procedure: CHOLECYSTECTOMY LAPAROSCOPIC INTRAOPERATIVE CHOLANGIOGRAM;  Surgeon: Cabrera Enriquez MD;  Location: [facility];  Service: General    DILATATION AND CURETTAGE      INNER EAR SURGERY       Social History     Socioeconomic History    Marital status:    Tobacco Use    Smoking status: Former     Current packs/day: 0.00     Types: Cigarettes     Quit date: 2016     Years since quittin.3    Smokeless tobacco: Never   Vaping Use    Vaping status: Never Used   Substance and Sexual Activity    Alcohol use: Yes     Comment: occ    Drug use: Yes     Types: Marijuana    Sexual activity: Defer     No family history on file.  Social History     Socioeconomic History    Marital status:    Tobacco Use    Smoking status: Former     Current packs/day: 0.00     Types: Cigarettes     Quit date: 2016     Years since quittin.3    Smokeless tobacco: Never   Vaping Use    Vaping status: Never Used   Substance and Sexual Activity  "   Alcohol use: Yes     Comment: occ    Drug use: Yes     Types: Marijuana    Sexual activity: Defer     No Known Allergies  Current Outpatient Medications   Medication Instructions    Auvelity  MG tablet controlled-release 1 tablet, Oral, Daily    Cholecalciferol (VITAMIN D-3 PO) Oral, Daily    LORazepam (ATIVAN) 1 mg, Oral, 2 Times Daily    Magnesium Hydroxide (MAGNESIA PO) Oral, Daily    Omega-3 Fatty Acids (fish oil) 1000 MG capsule capsule Oral, Daily With Breakfast    ondansetron (ZOFRAN) 4 mg, Oral, Every 8 Hours PRN    oxyCODONE-acetaminophen (PERCOCET) 5-325 MG per tablet 1 tablet, Oral, Every 6 Hours PRN, Prescribed 3 day supply only    Semaglutide(0.25 or 0.5MG/DOS) (OZEMPIC) 0.25 mg, Subcutaneous, Weekly    traZODone (DESYREL) 50 mg, Oral, Nightly    Vraylar 1.5 mg, Oral, Daily          Review of Systems   Constitutional: Negative.  Negative for diaphoresis and fever.   HENT: Negative.     Eyes: Negative.    Respiratory: Negative.  Negative for shortness of breath and wheezing.    Cardiovascular:  Positive for leg swelling.        Leg cramping   Gastrointestinal: Negative.  Negative for abdominal pain.   Endocrine: Negative.    Genitourinary: Negative.    Musculoskeletal: Negative.  Negative for gait problem.   Skin: Negative.    Allergic/Immunologic: Negative.    Neurological: Negative.  Negative for dizziness and numbness.   Hematological: Negative.    Psychiatric/Behavioral: Negative.           /80   Pulse 81   Ht 175.3 cm (69\")   Wt 127 kg (281 lb)   SpO2 99%   BMI 41.50 kg/m²      Physical Exam  Vitals and nursing note reviewed.   Constitutional:       General: She is not in acute distress.     Appearance: Normal appearance. She is morbidly obese. She is not diaphoretic.   HENT:      Head: Normocephalic. No right periorbital erythema or left periorbital erythema.      Nose: Nose normal.   Eyes:      General: No scleral icterus.     Pupils: Pupils are equal.   Cardiovascular:      " Rate and Rhythm: Normal rate and regular rhythm.      Pulses: Normal pulses.      Heart sounds: Normal heart sounds. No murmur heard.     Comments: Varicose veins located to bilateral lower extremities, right greater than left  Pulmonary:      Effort: Pulmonary effort is normal. No respiratory distress.      Breath sounds: Normal breath sounds.   Abdominal:      General: Bowel sounds are normal. There is no distension.      Palpations: Abdomen is soft.      Tenderness: There is no abdominal tenderness. There is no guarding.   Musculoskeletal:         General: No swelling or tenderness. Normal range of motion.      Cervical back: Normal range of motion and neck supple.      Right lower leg: Edema present.      Left lower leg: Edema present.   Feet:      Right foot:      Skin integrity: Skin integrity normal.      Left foot:      Skin integrity: Skin integrity normal.   Skin:     General: Skin is warm and dry.      Findings: No erythema or rash.   Neurological:      General: No focal deficit present.      Mental Status: She is alert and oriented to person, place, and time. Mental status is at baseline.      Cranial Nerves: No cranial nerve deficit.      Gait: Gait normal.   Psychiatric:         Attention and Perception: Attention normal.         Mood and Affect: Mood normal.         No results found.     Problem List       Patient Active Problem List   Diagnosis    Calculus of gallbladder without cholecystitis without obstruction    Cellulitis left axilla    Left axillary abscess    Class 3 severe obesity with body mass index (BMI) of 40.0 to 44.9 in adult    Anxiety and depression            Visit Diagnosis       ICD-10-CM ICD-9-CM   1. Chronic venous hypertension with inflammation involving both sides  I87.323 459.32               Plan: After thoroughly evaluating Mey Pugaivner, I believe the best course of action is to proceed with right lower extremity radiofrequency ablation of greater saphenous and anterior  accessory vein. Risks of radiofrequency ablation include, but are not limited to, bleeding, infection, vessel damage, nerve damage, DVT, phlebitis, and pulmonary embolus.  The patient understands these risks and wishes to proceed with procedure.  I would like her to continue wearing compression stockings and 20 to 30 mmHg range.  Elevate her legs when she is not on them, and keep her legs well moisturized.  She does state that in her line of work, with the excessive standing and walking required, it impacts her a great deal because she is oftentimes having to rest more often due to the pain.  She also states that it impedes her sleep, often waking her up. The patient can continue taking their current medication regimen as previously planned.  This was all discussed in full with complete understanding.     Thank you for allowing me to participate in the care of your patient.  Please do not hesitate with any questions or concerns.  I will keep you aware of any further encounters with Mey Valle.           Sincerely yours,           Aamir Nunez, DO

## 2024-07-01 NOTE — ANESTHESIA PREPROCEDURE EVALUATION
Anesthesia Evaluation     Patient summary reviewed   no history of anesthetic complications:   NPO Solid Status: > 8 hours             Airway   Mallampati: I  TM distance: >3 FB  Neck ROM: full  No difficulty expected  Dental - normal exam     Pulmonary - negative pulmonary ROS   (-) asthma, sleep apnea, not a smoker  Cardiovascular   Exercise tolerance: good (4-7 METS)    (+) PVD  (-) hypertension, hyperlipidemia      Neuro/Psych  (+) seizures (5th grade, no seizure meds), psychiatric history Anxiety  (-) TIA, CVA  GI/Hepatic/Renal/Endo    (+) morbid obesity  (-) liver disease, no renal disease, diabetes    Musculoskeletal     Abdominal    Substance History      OB/GYN          Other                    Anesthesia Plan    ASA 3     MAC       Anesthetic plan, risks, benefits, and alternatives have been provided, discussed and informed consent has been obtained with: patient.    CODE STATUS:

## 2024-07-01 NOTE — ANESTHESIA POSTPROCEDURE EVALUATION
Patient: Mey Valle    Procedure Summary       Date: 07/01/24 Room [facility] [address] [place] [address] PAD HYBRID OR    Anesthesia Start: 0950 Anesthesia Stop: 1048    Procedure: RIGHT LOWER EXTREMITY RADIOFREQUENCY ABLATION OF GREATER SAPHENOUS AND ACCESSORY VEIN (Right: Leg Lower) Diagnosis:       Chronic venous hypertension with inflammation involving both sides      Preop testing      (Chronic venous hypertension with inflammation involving both sides [I87.323])      (Preop testing [Z01.818])    Surgeons: Aamir Nunez DO Provider: Tatiana Middleton CRNA    Anesthesia Type: MAC ASA Status: 3            Anesthesia Type: MAC    Vitals  Vitals Value Taken Time   /77 07/01/24 1051   Temp     Pulse 58 07/01/24 1054   Resp     SpO2 97 % 07/01/24 1054   Vitals shown include unfiled device data.        Post Anesthesia Care and Evaluation    Patient location during evaluation: PACU  Patient participation: complete - patient participated  Level of consciousness: awake and alert  Pain management: adequate    Airway patency: patent  Anesthetic complications: No anesthetic complications  PONV Status: none  Cardiovascular status: acceptable and hemodynamically stable  Respiratory status: acceptable  Hydration status: acceptable    Comments: Blood pressure 115/74, pulse 58, temperature 96.9 °F (36.1 °C), temperature source Temporal, resp. rate 16, SpO2 100%, not currently breastfeeding.    Patient discharged from PACU based upon Lia score. Please see RN notes for further details

## 2024-07-05 ENCOUNTER — TELEPHONE (OUTPATIENT)
Dept: VASCULAR SURGERY | Facility: CLINIC | Age: 34
End: 2024-07-05
Payer: COMMERCIAL

## 2024-07-08 ENCOUNTER — HOSPITAL ENCOUNTER (OUTPATIENT)
Dept: ULTRASOUND IMAGING | Facility: HOSPITAL | Age: 34
Discharge: HOME | End: 2024-07-08
Admitting: SURGERY
Payer: COMMERCIAL

## 2024-07-08 ENCOUNTER — OFFICE VISIT (OUTPATIENT)
Dept: VASCULAR SURGERY | Facility: CLINIC | Age: 34
End: 2024-07-08
Payer: COMMERCIAL

## 2024-07-08 VITALS
SYSTOLIC BLOOD PRESSURE: 132 MMHG | HEART RATE: 83 BPM | BODY MASS INDEX: 44.41 KG/M2 | OXYGEN SATURATION: 98 % | HEIGHT: 68 IN | WEIGHT: 293 LBS | DIASTOLIC BLOOD PRESSURE: 78 MMHG

## 2024-07-08 DIAGNOSIS — Z01.818 PREOP TESTING: ICD-10-CM

## 2024-07-08 DIAGNOSIS — I87.323 CHRONIC VENOUS HYPERTENSION WITH INFLAMMATION INVOLVING BOTH SIDES: ICD-10-CM

## 2024-07-08 DIAGNOSIS — I87.323 CHRONIC VENOUS HYPERTENSION WITH INFLAMMATION INVOLVING BOTH SIDES: Primary | ICD-10-CM

## 2024-07-08 PROCEDURE — 93971 EXTREMITY STUDY: CPT

## 2024-07-08 PROCEDURE — 99213 OFFICE O/P EST LOW 20 MIN: CPT | Performed by: NURSE PRACTITIONER

## 2024-07-08 PROCEDURE — 93971 EXTREMITY STUDY: CPT | Performed by: SURGERY

## 2024-07-08 NOTE — PROGRESS NOTES
2024        Lela Daugherty APRN  [address] [facility] [place] [address]      Mey Valle  1990    Chief Complaint   Patient presents with    RLE RFA post op follow up     No complaints an d no changes       Dear RYLEE Waters:      HPI  I had the pleasure of seeing your patient Mey Valle in the office today.  As you recall, Mey Valle is a 33 y.o.  female who you are currently following for teen health maintenance.  She is here today after undergoing right lower extremity radiofrequency ablation on 2024.  She is doing well, and feels much better.  She does wear compression stockings on a daily basis.  She denies previous history of DVT.         Past Medical History:   Diagnosis Date    Anxiety     Depression     Seizures     as a child       Past Surgical History:   Procedure Laterality Date    CHOLECYSTECTOMY WITH INTRAOPERATIVE CHOLANGIOGRAM N/A 2019    Procedure: CHOLECYSTECTOMY LAPAROSCOPIC INTRAOPERATIVE CHOLANGIOGRAM;  Surgeon: Cabrera Enriquez MD;  Location: [facility] OR;  Service: General    DILATATION AND CURETTAGE      INNER EAR SURGERY      VARICOSE VEIN SURGERY Right 2024    Procedure: RIGHT LOWER EXTREMITY RADIOFREQUENCY ABLATION OF GREATER SAPHENOUS AND ACCESSORY VEIN;  Surgeon: Aamir Nunez DO;  Location: [facility] HYBRID OR;  Service: Vascular;  Laterality: Right;       History reviewed. No pertinent family history.    Social History     Socioeconomic History    Marital status:    Tobacco Use    Smoking status: Former     Current packs/day: 0.00     Types: Cigarettes     Quit date: 2016     Years since quittin.3    Smokeless tobacco: Never   Vaping Use    Vaping status: Never Used   Substance and Sexual Activity    Alcohol use: Yes     Comment: occ    Drug use: Yes     Types: Marijuana    Sexual activity: Defer       No Known Allergies    Current Outpatient Medications   Medication Instructions    Auvelity  MG tablet  "controlled-release 1 tablet, Oral, Daily    Cholecalciferol (VITAMIN D-3 PO) Oral, Daily    LORazepam (ATIVAN) 1 mg, Oral, 2 Times Daily    Magnesium Hydroxide (MAGNESIA PO) Oral, Daily    Omega-3 Fatty Acids (fish oil) 1000 MG capsule capsule Oral, Daily With Breakfast    ondansetron (ZOFRAN) 4 mg, Oral, Every 8 Hours PRN    oxyCODONE-acetaminophen (PERCOCET) 5-325 MG per tablet 1 tablet, Oral, Every 6 Hours PRN, Prescribed 3 day supply only    oxyCODONE-acetaminophen (Percocet) 5-325 MG per tablet 1 tablet, Oral, Every 6 Hours PRN    Semaglutide(0.25 or 0.5MG/DOS) (OZEMPIC) 0.25 mg, Subcutaneous, Weekly    traZODone (DESYREL) 50 mg, Oral, Nightly    Vraylar 1.5 mg, Oral, Daily       Review of Systems   Constitutional: Negative.  Negative for diaphoresis and fever.   HENT: Negative.     Eyes: Negative.    Respiratory: Negative.  Negative for shortness of breath and wheezing.    Cardiovascular:  Positive for leg swelling.   Gastrointestinal: Negative.  Negative for abdominal pain.   Endocrine: Negative.    Genitourinary: Negative.    Musculoskeletal: Negative.  Negative for gait problem.   Skin: Negative.    Allergic/Immunologic: Negative.    Neurological: Negative.  Negative for dizziness and numbness.   Hematological: Negative.    Psychiatric/Behavioral: Negative.       /78   Pulse 83   Ht 172.7 cm (68\")   Wt (!) 137 kg (302 lb)   LMP 06/10/2024 (Approximate)   SpO2 98%   BMI 45.92 kg/m²     Physical Exam  Vitals and nursing note reviewed.   Constitutional:       General: She is not in acute distress.     Appearance: Normal appearance. She is not diaphoretic.   HENT:      Head: Normocephalic. No right periorbital erythema or left periorbital erythema.      Nose: Nose normal.   Eyes:      General: No scleral icterus.     Pupils: Pupils are equal.   Cardiovascular:      Rate and Rhythm: Normal rate and regular rhythm.      Pulses: Normal pulses.           Carotid pulses are 2+ on the right side and 2+ on " "the left side.       Femoral pulses are 2+ on the right side and 2+ on the left side.       Popliteal pulses are 2+ on the right side and 2+ on the left side.        Dorsalis pedis pulses are 2+ on the right side and 2+ on the left side.        Posterior tibial pulses are 2+ on the right side and 2+ on the left side.      Heart sounds: Normal heart sounds. No murmur heard.     Comments: Varicose veins located to bilateral lower extremities.  Pulmonary:      Effort: Pulmonary effort is normal. No respiratory distress.      Breath sounds: Normal breath sounds.   Abdominal:      General: Bowel sounds are normal. There is no distension.      Palpations: Abdomen is soft.      Tenderness: There is no abdominal tenderness. There is no guarding.   Musculoskeletal:         General: No swelling or tenderness. Normal range of motion.      Cervical back: Normal range of motion and neck supple.      Right lower leg: No edema.      Left lower leg: No edema.   Feet:      Right foot:      Skin integrity: Skin integrity normal.      Left foot:      Skin integrity: Skin integrity normal.   Skin:     General: Skin is warm and dry.      Findings: No erythema or rash.   Neurological:      General: No focal deficit present.      Mental Status: She is alert and oriented to person, place, and time. Mental status is at baseline.      Cranial Nerves: No cranial nerve deficit.      Gait: Gait normal.   Psychiatric:         Attention and Perception: Attention normal.         Mood and Affect: Mood normal.         Behavior: Behavior normal.         Thought Content: Thought content normal.         Judgment: Judgment normal.     DIAGNOSTIC DATA  Salo Golden on 7/8/2024 11:15 AM CDT   RLE s/p RFA:  GSV appears closed from ankle to within 1.99 cm of SFJ;  ANT ACC appears closed from prox thight to juction with GSV;  No evidence of DVT         /78   Pulse 83   Ht 172.7 cm (68\")   Wt (!) 137 kg (302 lb)   LMP 06/10/2024 (Approximate) "   SpO2 98%   BMI 45.92 kg/m²       US Guided Vascular Access    Result Date: 7/1/2024  Narrative: History: Swelling  Comments: Grayscale imaging as well as color flow duplex were used to evaluate the right lower extremity greater saphenous and anterior accessory saphenous veins during venous closure.  The greater saphenous and anterior accessory saphenous veins were successfully cannulated. Catheter was placed up to the junction and pulled back 3 cm and marked.      Impression: Successful endovenous closure of the right lower extremity greater saphenous and anterior accessory saphenous veins.  This report was signed and finalized on 7/1/2024 1:22 PM by Dr. Aamir Nunez MD.      US Venous Doppler Lower Extremity Bilateral (duplex)    Result Date: 6/24/2024  Narrative: History: Swelling  Comments: Venous valvular insufficiency testing was performed in the bilateral lower extremities using duplex ultrasound with compression techniques.  The common femoral vein, superficial femoral, popliteal vein, posterior tibial vein, peroneal vein, greater saphenous vein, and lesser saphenous veins were interrogated.  On the right, the greater saphenous vein at the junction measured 6.8 mm. In the mid thigh measured 6.1 mm. Above the knee measured 4.3 mm. In the mid calf measured 2.4 mm. At the ankle measured 2.0 mm. There is greater than 0.5 seconds of reflux from the junction to the mid thigh. The lesser saphenous vein at the knee measured 1.6 mm. In the mid calf measured 2.6 mm. In the distal calf measured 2.6 mm. There is greater than 0.5 seconds of reflux from the knee to the ankle.. There is no evidence of DVT. The anterior accessory saphenous vein measures 9 mm and has greater than 0.5 seconds of reflux. The anterior thigh varicose veins also connect to the anterior accessory saphenous vein. There are varicose veins on the thigh that also connect with the greater saphenous vein. There is a large varicose veins on the  posterior thigh that connect to the VOG.  On the left, the greater saphenous vein at the junction measured 9.5 mm. In the mid thigh measured 4.5 mm. Above the knee measured 2.1 mm. In the mid calf measured 1.7 mm. At the ankle measured 1.3 mm. There is greater than 0.5 seconds of reflux from the junction to the knee. The lesser saphenous vein is within normal limits and no evidence of reflux. There is no evidence of DVT. The anterior accessory saphenous vein measures 8.2 mm and has greater than 0.5 seconds of reflux.      Impression: Impression: There is evidence of significant venous insufficiency in both lower extremity greater saphenous veins and anterior accessory saphenous veins. There is also evidence of venous insufficiency in the right lower extremity small saphenous vein. There are large varicosities on both lower extremities as well.  This report was signed and finalized on 6/24/2024 2:41 PM by Dr. Aamir Nunez MD.       Patient Active Problem List   Diagnosis    Calculus of gallbladder without cholecystitis without obstruction    Cellulitis left axilla    Left axillary abscess    Class 3 severe obesity with body mass index (BMI) of 40.0 to 44.9 in adult    Anxiety and depression    Chronic venous hypertension with inflammation involving both sides    Preop testing         ICD-10-CM ICD-9-CM   1. Chronic venous hypertension with inflammation involving both sides  I87.323 459.32           Plan: After thoroughly evaluating Mey Valle, I believe the best course of action is to proceed with left lower extremity radiofrequency ablation of the greater saphenous and anterior accessory veins.  Risks of radiofrequency ablation include, but are not limited to, bleeding, infection, vessel damage, nerve damage, DVT, phlebitis, and pulmonary embolus.  The patient understands these risks and wishes to proceed with procedure.   She is doing well after undergoing right lower extremity radiofrequency ablation,  she feels like her leg feels much better and she is ready to proceed with the left lower extremity radiofrequency ablation.  I did review her testing and venous duplex shows no DVT present, GSV closed as expected.  I would like her to continue wearing compression stockings daily, elevating her legs when not on them.  Her blood pressure is stable today in office.  She can continue her medications as previously planned.  This was all discussed in full with complete understanding.     Thank you for allowing me to participate in the care of your patient.  Please do not hesitate with any questions or concerns.  I will keep you aware of any further encounters with Mey Valle.             Sincerely yours,         RYLEE Lane

## 2024-07-11 ENCOUNTER — TELEPHONE (OUTPATIENT)
Dept: PSYCHIATRY | Age: 34
End: 2024-07-11

## 2024-07-11 NOTE — TELEPHONE ENCOUNTER
Patient no showed to their appointment in the [facility]. Office called the patient to check on them and to reschedule their appointment.     Left voicemail for patient to call the office back at [phone] Option 3 and provided education regarding the three no show and dismissal policies. Provided education that after three no show, patients can be dismissed by the provider and practice.  .     Barriers to treatment: N/A - didn't speak to patient.      Electronically signed by Peng Ricks on 7/11/2024 at 3:08 PM

## 2024-07-12 ENCOUNTER — TELEPHONE (OUTPATIENT)
Dept: PSYCHIATRY | Age: 34
End: 2024-07-12

## 2024-07-12 ENCOUNTER — TELEPHONE (OUTPATIENT)
Dept: VASCULAR SURGERY | Facility: CLINIC | Age: 34
End: 2024-07-12
Payer: COMMERCIAL

## 2024-07-12 NOTE — TELEPHONE ENCOUNTER
Called patient on 07/10/24 to remind them of their appointment on 07/11/24  -left voicemail, requesting a return call    Reminded patient to complete their visit pre-check/digital registration in [employer].    Electronically signed by Aster Multani MA on 7/12/2024 at 9:44 AM

## 2024-07-12 NOTE — TELEPHONE ENCOUNTER
Pt expressed understanding of prework/surgery time and instruction for procedure scheduled 08/08/24 with Dr. Nunez.  NPO after midnight.  Pt to hold Ozempic 1 week prior

## 2024-07-19 DIAGNOSIS — F41.9 ANXIETY: Primary | ICD-10-CM

## 2024-07-19 RX ORDER — LORAZEPAM 1 MG/1
TABLET ORAL
Qty: 60 TABLET | Refills: 0 | OUTPATIENT
Start: 2024-07-19

## 2024-07-19 RX ORDER — LORAZEPAM 1 MG/1
1 TABLET ORAL 2 TIMES DAILY PRN
COMMUNITY
End: 2024-07-19 | Stop reason: SDUPTHER

## 2024-07-19 RX ORDER — LORAZEPAM 1 MG/1
1 TABLET ORAL 2 TIMES DAILY PRN
Qty: 60 TABLET | Refills: 0 | Status: SHIPPED | OUTPATIENT
Start: 2024-07-19 | End: 2024-08-18

## 2024-07-19 NOTE — TELEPHONE ENCOUNTER
From patient:    Hello! Can I please get a refill on my Ativan please. Thanks so much.       Provider needs to review PDMP    PDMP Monitoring:    Last PDMP Manoj as Reviewed ([place]):  Review User Review Instant Review Result   ABHISHEK BAZAN 4/4/2024  1:15 PM Reviewed PDMP [1]     Urine Drug Screenings (1 yr)       Urine Drug Screen  Collected: 5/3/2020  8:10 PM (Final result)              Urine Drug Screen  Collected: 10/29/2018  9:30 PM (Final result)                  Medication Contract and Consent for Opioid Use Documents Filed       Patient Documents       Type of Document Status Date Received Received By Description    Medication Contract Received 7/18/2023 12:14 PM CLEMENTINA COTTON Medication Contract    Medication Contract Received 8/8/2023  3:03 PM TIFFANIE SMITH

## 2024-07-24 ENCOUNTER — TELEPHONE (OUTPATIENT)
Dept: PSYCHIATRY | Age: 34
End: 2024-07-24

## 2024-07-24 NOTE — TELEPHONE ENCOUNTER
PA was approved for Vraylar 1.5 mg     Called pharmacy and let them know that the PA was approved     Called and lvm letting pt know that the PA was approved       Electronically signed by Peng Ricks on 7/24/2024 at 1:52 PM

## 2024-07-25 ENCOUNTER — OFFICE VISIT (OUTPATIENT)
Dept: PSYCHIATRY | Age: 34
End: 2024-07-25
Payer: COMMERCIAL

## 2024-07-25 VITALS
WEIGHT: 293 LBS | SYSTOLIC BLOOD PRESSURE: 134 MMHG | HEART RATE: 95 BPM | DIASTOLIC BLOOD PRESSURE: 86 MMHG | TEMPERATURE: 98.1 F | BODY MASS INDEX: 43.4 KG/M2 | OXYGEN SATURATION: 97 % | HEIGHT: 69 IN

## 2024-07-25 DIAGNOSIS — F41.1 GAD (GENERALIZED ANXIETY DISORDER): ICD-10-CM

## 2024-07-25 DIAGNOSIS — F33.0 MILD EPISODE OF RECURRENT MAJOR DEPRESSIVE DISORDER (HCC): ICD-10-CM

## 2024-07-25 DIAGNOSIS — G47.00 INSOMNIA, UNSPECIFIED TYPE: Primary | ICD-10-CM

## 2024-07-25 PROCEDURE — 3079F DIAST BP 80-89 MM HG: CPT

## 2024-07-25 PROCEDURE — 3075F SYST BP GE 130 - 139MM HG: CPT

## 2024-07-25 PROCEDURE — 99214 OFFICE O/P EST MOD 30 MIN: CPT

## 2024-07-25 RX ORDER — DOXEPIN HYDROCHLORIDE 25 MG/1
25-50 CAPSULE ORAL NIGHTLY
Qty: 45 CAPSULE | Refills: 0 | Status: SHIPPED | OUTPATIENT
Start: 2024-07-25
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
## 2024-07-26 ENCOUNTER — OFFICE VISIT (OUTPATIENT)
Dept: PSYCHIATRY | Age: 34
End: 2024-07-26
Payer: COMMERCIAL

## 2024-07-26 DIAGNOSIS — F41.1 GAD (GENERALIZED ANXIETY DISORDER): ICD-10-CM

## 2024-07-26 DIAGNOSIS — F33.0 MILD EPISODE OF RECURRENT MAJOR DEPRESSIVE DISORDER (HCC): Primary | ICD-10-CM

## 2024-07-26 PROCEDURE — 90837 PSYTX W PT 60 MINUTES: CPT

## 2024-07-26 NOTE — PROGRESS NOTES
Therapy Progress Note  Clarisa Vines M.S., [facility]  7/26/2024    Patient location  : [facility] location : [facility]      Total time spent: 60 minutes  This is patient's seventh  Therapy appointment.  Reason for Consult:  depression, anxiety, and stress  Referring Provider: No referring provider defined for this encounter.      Flavia Hightower ,a 33 y.o. female, for follow up visit.     Pt provided informed consent for the behavioral health program. Discussed with patient model of service to include the limits of confidentiality (i.e. abuse reporting, suicide intervention, etc.) and short-term intervention focused approach.  Discussed no show and late cancellation policy.  Pt indicated understanding.    S:  Pt reports she has been having a lot of anxiety lately. Pt reports she had two panic attacks recently. 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 Pt reports having a new boss at work. Pt reports she is a people pleaser and wants to do things right. Pt reports sometimes she puts to much on herself which makes her feel down. Pt reports her son is starting  this year and she is worried about him. Pt reports he did not have a good year in  and  is an all day thing. Pt reports she did talk to her mom and things went really well. Pt reports her mom took some accountability and is making an effort. Pt reports she has been having trouble with sleeping. Pt reports she has been only getting about three hours of sleep each night. Pt reports she went to see Mimi yesterday and is now on medications to help her sleep. Pt reports she is picking her medications up today and is hopeful they help with her sleep issues. Pt calm, cooperative, pleasant, smiling, laughing, and denies si, hi, avh. Therapist and pt discussed the cycle of anxiety, distractions, coping skills for anxiety, mindfulness, and grounding

## 2024-07-30 ENCOUNTER — PRE-ADMISSION TESTING (OUTPATIENT)
Dept: PREADMISSION TESTING | Facility: HOSPITAL | Age: 34
End: 2024-07-30
Payer: COMMERCIAL

## 2024-07-30 VITALS
BODY MASS INDEX: 44.41 KG/M2 | SYSTOLIC BLOOD PRESSURE: 153 MMHG | HEART RATE: 97 BPM | WEIGHT: 293 LBS | RESPIRATION RATE: 18 BRPM | HEIGHT: 68 IN | OXYGEN SATURATION: 98 % | DIASTOLIC BLOOD PRESSURE: 88 MMHG

## 2024-07-30 LAB
ANION GAP SERPL CALCULATED.3IONS-SCNC: 8 MMOL/L (ref 5–15)
BUN SERPL-MCNC: 15 MG/DL (ref 6–20)
BUN/CREAT SERPL: 20.5 (ref 7–25)
CALCIUM SPEC-SCNC: 8.9 MG/DL (ref 8.6–10.5)
CHLORIDE SERPL-SCNC: 110 MMOL/L (ref 98–107)
CO2 SERPL-SCNC: 24 MMOL/L (ref 22–29)
CREAT SERPL-MCNC: 0.73 MG/DL (ref 0.57–1)
DEPRECATED RDW RBC AUTO: 40.2 FL (ref 37–54)
EGFRCR SERPLBLD CKD-EPI 2021: 112.2 ML/MIN/1.73
ERYTHROCYTE [DISTWIDTH] IN BLOOD BY AUTOMATED COUNT: 12.8 % (ref 12.3–15.4)
GLUCOSE SERPL-MCNC: 118 MG/DL (ref 65–99)
HCT VFR BLD AUTO: 36.4 % (ref 34–46.6)
HGB BLD-MCNC: 12.4 G/DL (ref 12–15.9)
MCH RBC QN AUTO: 29.4 PG (ref 26.6–33)
MCHC RBC AUTO-ENTMCNC: 34.1 G/DL (ref 31.5–35.7)
MCV RBC AUTO: 86.3 FL (ref 79–97)
PLATELET # BLD AUTO: 285 10*3/MM3 (ref 140–450)
PMV BLD AUTO: 10.8 FL (ref 6–12)
POTASSIUM SERPL-SCNC: 3.8 MMOL/L (ref 3.5–5.2)
RBC # BLD AUTO: 4.22 10*6/MM3 (ref 3.77–5.28)
SODIUM SERPL-SCNC: 142 MMOL/L (ref 136–145)
WBC NRBC COR # BLD AUTO: 6.48 10*3/MM3 (ref 3.4–10.8)

## 2024-07-30 PROCEDURE — 85027 COMPLETE CBC AUTOMATED: CPT

## 2024-07-30 PROCEDURE — 36415 COLL VENOUS BLD VENIPUNCTURE: CPT

## 2024-07-30 PROCEDURE — 80048 BASIC METABOLIC PNL TOTAL CA: CPT

## 2024-07-30 RX ORDER — DOXEPIN HYDROCHLORIDE 25 MG/1
25 CAPSULE ORAL NIGHTLY
COMMUNITY

## 2024-07-30 NOTE — DISCHARGE INSTRUCTIONS
Preparing for Surgery  Follow these instructions before the procedure:  Several days or weeks before your procedure  Medication(s) you need to stop   7 days/week prior to surgery: Ozempic      Ask your health care provider about:  Changing or stopping your regular medicines. This is especially important if you are taking diabetes medicines or blood thinners.  Taking medicines such as aspirin and ibuprofen. These medicines can thin your blood. Do not take these medicines unless your health care provider tells you to take them.  Taking over-the-counter medicines, vitamins, herbs, and supplements.    Contact your surgeon if you:  Develop a fever of more than 100.4°F (38°C) or other feelings of illness during the 48 hours before your surgery.  Have symptoms that get worse.  Have questions or concerns about your surgery.  If you are going home the same day of your surgery you will need to arrange for a responsible adult, age 18 years old or older, to drive you home from the hospital and stay with you for 24 hours. Verification of the  will be made prior to any procedure requiring sedation. You may not go home in a taxi or any form of public transportation by yourself.     Day before your procedure  24 hours before your procedure DO NOT drink alcoholic beverages or smoke.  24 hours before your procedure STOP taking Erectile Dysfunction medication (i.e.,Cialis, Viagra)   You may be asked to shower with a germ-killing soap.  Day of your procedure   You may take the following medication(s) the morning of surgery with a sip of water: Ativan      8 hours before your procedure STOP all food, any dairy products, and full liquids. This includes hard candy, chewing gum or mints. This is extremely important to prevent serious complications.     Up to 2 hours before your scheduled arrival time, you may have clear liquids no cream, powder, or pulp of any kind. Safe options are water, black coffee, plain tea, soda,  Gatorade/Powerade, clear broth, apple juice.    2 hours before your scheduled arrival time, STOP drinking clear liquids.    You may need to take another shower with a germ-killing soap before you leave home in the morning. Do not use perfumes, colognes, or body lotions.  Wear comfortable loose-fitting clothing.  Remove all jewelry including body piercing and rings, dark colored nail polish, and make up prior to arrival at the hospital. Leave all valuables at home.   Bring your hearing aids if you rely on them.  Do not wear contact lenses. If you wear eyeglasses remember to bring a case to store them in while you are in surgery.  Do not use denture adhesives since you will be asked to remove them during your surgery.    You do not need to bring your home medications into the hospital.   Bring your sleep apnea device with you on the day of your surgery (if this applies to you).  If you wear portable oxygen, bring it with you.   If you are staying overnight, you may bring a bag of items you may need such as slippers, robe and a change of clothes for your discharge. You may want to leave these items in the car until you are ready for them since your family will take your belongings when you leave the pre-operative area.  Arrive at the hospital as scheduled by the office. You will be asked to arrive 2 hours prior to your surgery time in order to prepare for your procedure.  When you arrive at the hospital  Go to the registration desk located at the main entrance of the hospital.  After registration is completed, you will be given a beeper and a sticker sheet. Take the stickers to Outpatient Surgery and place in the tray at the end of the desk to notify the staff that you have arrived and registered.   Return to the lobby to wait. You are not always called back according to the time of arrival but rather the time your doctor will be ready.  When your beeper lights up and vibrates proceed through the double doors, under  the stairs, and a member of the Outpatient Surgery staff will escort you to your preoperative room.       How to Use Chlorhexidine Before Surgery  Chlorhexidine gluconate (CHG) is a germ-killing (antiseptic) solution that is used to clean the skin. It can get rid of the bacteria that normally live on the skin and can keep them away for about 24 hours. To clean your skin with CHG, you may be given:  A CHG solution to use in the shower or as part of a sponge bath.  A prepackaged cloth that contains CHG.  Cleaning your skin with CHG may help lower the risk for infection:  While you are staying in the intensive care unit of the hospital.  If you have a vascular access, such as a central line, to provide short-term or long-term access to your veins.  If you have a catheter to drain urine from your bladder.  If you are on a ventilator. A ventilator is a machine that helps you breathe by moving air in and out of your lungs.  After surgery.  What are the risks?  Risks of using CHG include:  A skin reaction.  Hearing loss, if CHG gets in your ears and you have a perforated eardrum.  Eye injury, if CHG gets in your eyes and is not rinsed out.  The CHG product catching fire.  Make sure that you avoid smoking and flames after applying CHG to your skin.  Do not use CHG:  If you have a chlorhexidine allergy or have previously reacted to chlorhexidine.  On babies younger than 2 months of age.  How to use CHG solution  Use CHG only as told by your health care provider, and follow the instructions on the label.  Use the full amount of CHG as directed. Usually, this is one bottle.  During a shower    Follow these steps when using CHG solution during a shower (unless your health care provider gives you different instructions):  Start the shower.  Use your normal soap and shampoo to wash your face and hair.  Turn off the shower or move out of the shower stream.  Pour the CHG onto a clean washcloth. Do not use any type of brush or  rough-edged sponge.  Starting at your neck, lather your body down to your toes. Make sure you follow these instructions:  If you will be having surgery, pay special attention to the part of your body where you will be having surgery. Scrub this area for at least 1 minute.  Do not use CHG on your head or face. If the solution gets into your ears or eyes, rinse them well with water.  Avoid your genital area.  Avoid any areas of skin that have broken skin, cuts, or scrapes.  Scrub your back and under your arms. Make sure to wash skin folds.  Let the lather sit on your skin for 1-2 minutes or as long as told by your health care provider.  Thoroughly rinse your entire body in the shower. Make sure that all body creases and crevices are rinsed well.  Dry off with a clean towel. Do not put any substances on your body afterward--such as powder, lotion, or perfume--unless you are told to do so by your health care provider. Only use lotions that are recommended by the .  Put on clean clothes or pajamas.  If it is the night before your surgery, sleep in clean sheets.     During a sponge bath  Follow these steps when using CHG solution during a sponge bath (unless your health care provider gives you different instructions):  Use your normal soap and shampoo to wash your face and hair.  Pour the CHG onto a clean washcloth.  Starting at your neck, lather your body down to your toes. Make sure you follow these instructions:  If you will be having surgery, pay special attention to the part of your body where you will be having surgery. Scrub this area for at least 1 minute.  Do not use CHG on your head or face. If the solution gets into your ears or eyes, rinse them well with water.  Avoid your genital area.  Avoid any areas of skin that have broken skin, cuts, or scrapes.  Scrub your back and under your arms. Make sure to wash skin folds.  Let the lather sit on your skin for 1-2 minutes or as long as told by your  health care provider.  Using a different clean, wet washcloth, thoroughly rinse your entire body. Make sure that all body creases and crevices are rinsed well.  Dry off with a clean towel. Do not put any substances on your body afterward--such as powder, lotion, or perfume--unless you are told to do so by your health care provider. Only use lotions that are recommended by the .  Put on clean clothes or pajamas.  If it is the night before your surgery, sleep in clean sheets.  How to use CHG prepackaged cloths  Only use CHG cloths as told by your health care provider, and follow the instructions on the label.  Use the CHG cloth on clean, dry skin.  Do not use the CHG cloth on your head or face unless your health care provider tells you to.  When washing with the CHG cloth:  Avoid your genital area.  Avoid any areas of skin that have broken skin, cuts, or scrapes.  Before surgery    Follow these steps when using a CHG cloth to clean before surgery (unless your health care provider gives you different instructions):  Using the CHG cloth, vigorously scrub the part of your body where you will be having surgery. Scrub using a back-and-forth motion for 3 minutes. The area on your body should be completely wet with CHG when you are done scrubbing.  Do not rinse. Discard the cloth and let the area air-dry. Do not put any substances on the area afterward, such as powder, lotion, or perfume.  Put on clean clothes or pajamas.  If it is the night before your surgery, sleep in clean sheets.     For general bathing  Follow these steps when using CHG cloths for general bathing (unless your health care provider gives you different instructions).  Use a separate CHG cloth for each area of your body. Make sure you wash between any folds of skin and between your fingers and toes. Wash your body in the following order, switching to a new cloth after each step:  The front of your neck, shoulders, and chest.  Both of your arms,  under your arms, and your hands.  Your stomach and groin area, avoiding the genitals.  Your right leg and foot.  Your left leg and foot.  The back of your neck, your back, and your buttocks.  Do not rinse. Discard the cloth and let the area air-dry. Do not put any substances on your body afterward--such as powder, lotion, or perfume--unless you are told to do so by your health care provider. Only use lotions that are recommended by the .  Put on clean clothes or pajamas.  Contact a health care provider if:  Your skin gets irritated after scrubbing.  You have questions about using your solution or cloth.  You swallow any chlorhexidine. Call your local poison control center (1-643.746.6092 in the [place]  Get help right away if:  Your eyes itch badly, or they become very red or swollen.  Your skin itches badly and is red or swollen.  Your hearing changes.  You have trouble seeing.  You have swelling or tingling in your mouth or throat.  You have trouble breathing.  These symptoms may represent a serious problem that is an emergency. Do not wait to see if the symptoms will go away. Get medical help right away. Call your local emergency services (468 in the [place] Do not drive yourself to the hospital.  Summary  Chlorhexidine gluconate (CHG) is a germ-killing (antiseptic) solution that is used to clean the skin. Cleaning your skin with CHG may help to lower your risk for infection.  You may be given CHG to use for bathing. It may be in a bottle or in a prepackaged cloth to use on your skin. Carefully follow your health care provider's instructions and the instructions on the product label.  Do not use CHG if you have a chlorhexidine allergy.  Contact your health care provider if your skin gets irritated after scrubbing.  This information is not intended to replace advice given to you by your health care provider. Make sure you discuss any questions you have with your health care provider.  Document Revised:  04/17/2023 Document Reviewed: 02/28/2022  Elsevier Patient Education © 2023 [employer]

## 2024-08-07 ENCOUNTER — TELEPHONE (OUTPATIENT)
Dept: VASCULAR SURGERY | Facility: CLINIC | Age: 34
End: 2024-08-07
Payer: COMMERCIAL

## 2024-08-07 NOTE — TELEPHONE ENCOUNTER
Pt expressed understanding of arrival time of 5 am for procedure scheduled with Dr. Nunez on 08/08/24.  Pt advised NPO after midnight.

## 2024-08-07 NOTE — H&P
2024             Lela Daugherty APRN  [address] [place] [address]        Mey Valle  1990          Chief Complaint   Patient presents with    RLE RFA post op follow up       No complaints an d no changes         Dear RYLEE Waters:        HPI  I had the pleasure of seeing your patient Mey Valle in the office today.  As you recall, Mey Valle is a 33 y.o.  female who you are currently following for teen health maintenance.  She is here today after undergoing right lower extremity radiofrequency ablation on 2024.  She is doing well, and feels much better.  She does continue to have discomfort, itching and swelling to her left lower extremity.  She does wear compression stockings on a daily basis.  She denies previous history of DVT.          Past Medical History:   Diagnosis Date    Anxiety     Depression     Seizures     as a child     Past Surgical History:   Procedure Laterality Date    CHOLECYSTECTOMY WITH INTRAOPERATIVE CHOLANGIOGRAM N/A 2019    Procedure: CHOLECYSTECTOMY LAPAROSCOPIC INTRAOPERATIVE CHOLANGIOGRAM;  Surgeon: Cabrera Enriquez MD;  Location: [facility] OR;  Service: General    DILATATION AND CURETTAGE      INNER EAR SURGERY      VARICOSE VEIN SURGERY Right 2024    Procedure: RIGHT LOWER EXTREMITY RADIOFREQUENCY ABLATION OF GREATER SAPHENOUS AND ACCESSORY VEIN;  Surgeon: Aamir Nunez DO;  Location: [facility] HYBRID OR;  Service: Vascular;  Laterality: Right;     Social History     Socioeconomic History    Marital status:    Tobacco Use    Smoking status: Former     Current packs/day: 0.00     Types: Cigarettes     Quit date: 2016     Years since quittin.4    Smokeless tobacco: Never   Vaping Use    Vaping status: Never Used   Substance and Sexual Activity    Alcohol use: Yes     Comment: occ    Drug use: Yes     Types: Marijuana    Sexual activity: Defer     No family history on file.  No Known Allergies  Current  "Outpatient Medications   Medication Instructions    Auvelity  MG tablet controlled-release 1 tablet, Oral, Daily    Cholecalciferol (VITAMIN D-3 PO) Oral, Daily    doxepin (SINEQUAN) 25 mg, Oral, Nightly    LORazepam (ATIVAN) 1 mg, Oral, 2 Times Daily    Magnesium Hydroxide (MAGNESIA PO) Oral, Daily    Omega-3 Fatty Acids (fish oil) 1000 MG capsule capsule Oral, Daily With Breakfast    ondansetron (ZOFRAN) 4 mg, Oral, Every 8 Hours PRN    oxyCODONE-acetaminophen (PERCOCET) 5-325 MG per tablet 1 tablet, Every 6 Hours PRN    oxyCODONE-acetaminophen (Percocet) 5-325 MG per tablet 1 tablet, Oral, Every 6 Hours PRN    Semaglutide(0.25 or 0.5MG/DOS) (OZEMPIC) 0.25 mg, Subcutaneous, Weekly    traZODone (DESYREL) 50 mg, Nightly    Vraylar 1.5 mg, Oral, Daily             Review of Systems   Constitutional: Negative.  Negative for diaphoresis and fever.   HENT: Negative.     Eyes: Negative.    Respiratory: Negative.  Negative for shortness of breath and wheezing.    Cardiovascular:  Positive for leg swelling.   Gastrointestinal: Negative.  Negative for abdominal pain.   Endocrine: Negative.    Genitourinary: Negative.    Musculoskeletal: Negative.  Negative for gait problem.   Skin: Negative.    Allergic/Immunologic: Negative.    Neurological: Negative.  Negative for dizziness and numbness.   Hematological: Negative.    Psychiatric/Behavioral: Negative.        /78   Pulse 83   Ht 172.7 cm (68\")   Wt (!) 137 kg (302 lb)   LMP 06/10/2024 (Approximate)   SpO2 98%   BMI 45.92 kg/m²      Physical Exam  Vitals and nursing note reviewed.   Constitutional:       General: She is not in acute distress.     Appearance: Normal appearance. She is not diaphoretic.   HENT:      Head: Normocephalic. No right periorbital erythema or left periorbital erythema.      Nose: Nose normal.   Eyes:      General: No scleral icterus.     Pupils: Pupils are equal.   Cardiovascular:      Rate and Rhythm: Normal rate and regular rhythm. "      Pulses: Normal pulses.           Carotid pulses are 2+ on the right side and 2+ on the left side.       Femoral pulses are 2+ on the right side and 2+ on the left side.       Popliteal pulses are 2+ on the right side and 2+ on the left side.        Dorsalis pedis pulses are 2+ on the right side and 2+ on the left side.        Posterior tibial pulses are 2+ on the right side and 2+ on the left side.      Heart sounds: Normal heart sounds. No murmur heard.     Comments: Varicose veins located to bilateral lower extremities.  Pulmonary:      Effort: Pulmonary effort is normal. No respiratory distress.      Breath sounds: Normal breath sounds.   Abdominal:      General: Bowel sounds are normal. There is no distension.      Palpations: Abdomen is soft.      Tenderness: There is no abdominal tenderness. There is no guarding.   Musculoskeletal:         General: No swelling or tenderness. Normal range of motion.      Cervical back: Normal range of motion and neck supple.      Right lower leg: No edema.      Left lower leg: No edema.   Feet:      Right foot:      Skin integrity: Skin integrity normal.      Left foot:      Skin integrity: Skin integrity normal.   Skin:     General: Skin is warm and dry.      Findings: No erythema or rash.   Neurological:      General: No focal deficit present.      Mental Status: She is alert and oriented to person, place, and time. Mental status is at baseline.      Cranial Nerves: No cranial nerve deficit.      Gait: Gait normal.   Psychiatric:         Attention and Perception: Attention normal.         Mood and Affect: Mood normal.         Behavior: Behavior normal.         Thought Content: Thought content normal.         Judgment: Judgment normal.      DIAGNOSTIC DATA  Salo Golden on 7/8/2024 11:15 AM CDT   RLE s/p RFA:  GSV appears closed from ankle to within 1.99 cm of SFJ;  ANT ACC appears closed from prox thight to juction with GSV;  No evidence of DVT            BP  "132/78   Pulse 83   Ht 172.7 cm (68\")   Wt (!) 137 kg (302 lb)   LMP 06/10/2024 (Approximate)   SpO2 98%   BMI 45.92 kg/m²        US Guided Vascular Access     Result Date: 7/1/2024  Narrative: History: Swelling  Comments: Grayscale imaging as well as color flow duplex were used to evaluate the right lower extremity greater saphenous and anterior accessory saphenous veins during venous closure.  The greater saphenous and anterior accessory saphenous veins were successfully cannulated. Catheter was placed up to the junction and pulled back 3 cm and marked.       Impression: Successful endovenous closure of the right lower extremity greater saphenous and anterior accessory saphenous veins.  This report was signed and finalized on 7/1/2024 1:22 PM by Dr. Aamir Nunez MD.       US Venous Doppler Lower Extremity Bilateral (duplex)     Result Date: 6/24/2024  Narrative: History: Swelling  Comments: Venous valvular insufficiency testing was performed in the bilateral lower extremities using duplex ultrasound with compression techniques.  The common femoral vein, superficial femoral, popliteal vein, posterior tibial vein, peroneal vein, greater saphenous vein, and lesser saphenous veins were interrogated.  On the right, the greater saphenous vein at the junction measured 6.8 mm. In the mid thigh measured 6.1 mm. Above the knee measured 4.3 mm. In the mid calf measured 2.4 mm. At the ankle measured 2.0 mm. There is greater than 0.5 seconds of reflux from the junction to the mid thigh. The lesser saphenous vein at the knee measured 1.6 mm. In the mid calf measured 2.6 mm. In the distal calf measured 2.6 mm. There is greater than 0.5 seconds of reflux from the knee to the ankle.. There is no evidence of DVT. The anterior accessory saphenous vein measures 9 mm and has greater than 0.5 seconds of reflux. The anterior thigh varicose veins also connect to the anterior accessory saphenous vein. There are varicose veins on " the thigh that also connect with the greater saphenous vein. There is a large varicose veins on the posterior thigh that connect to the VOG.  On the left, the greater saphenous vein at the junction measured 9.5 mm. In the mid thigh measured 4.5 mm. Above the knee measured 2.1 mm. In the mid calf measured 1.7 mm. At the ankle measured 1.3 mm. There is greater than 0.5 seconds of reflux from the junction to the knee. The lesser saphenous vein is within normal limits and no evidence of reflux. There is no evidence of DVT. The anterior accessory saphenous vein measures 8.2 mm and has greater than 0.5 seconds of reflux.       Impression: Impression: There is evidence of significant venous insufficiency in both lower extremity greater saphenous veins and anterior accessory saphenous veins. There is also evidence of venous insufficiency in the right lower extremity small saphenous vein. There are large varicosities on both lower extremities as well.  This report was signed and finalized on 6/24/2024 2:41 PM by Dr. Aamir Nunez MD.        Problem List       Patient Active Problem List   Diagnosis    Calculus of gallbladder without cholecystitis without obstruction    Cellulitis left axilla    Left axillary abscess    Class 3 severe obesity with body mass index (BMI) of 40.0 to 44.9 in adult    Anxiety and depression    Chronic venous hypertension with inflammation involving both sides    Preop testing            Visit Diagnosis       ICD-10-CM ICD-9-CM   1. Chronic venous hypertension with inflammation involving both sides  I87.323 459.32                  Plan: After thoroughly evaluating Mey Valle, I believe the best course of action is to proceed with left lower extremity radiofrequency ablation of the greater saphenous and anterior accessory veins.  Risks of radiofrequency ablation include, but are not limited to, bleeding, infection, vessel damage, nerve damage, DVT, phlebitis, and pulmonary embolus.  The  patient understands these risks and wishes to proceed with procedure.   She is doing well after undergoing right lower extremity radiofrequency ablation, she feels like her leg feels much better and she is ready to proceed with the left lower extremity radiofrequency ablation.  I did review her testing and venous duplex shows no DVT present, GSV closed as expected.  I would like her to continue wearing compression stockings daily, elevating her legs when not on them.  Her blood pressure is stable today in office.  She can continue her medications as previously planned.  This was all discussed in full with complete understanding.     Thank you for allowing me to participate in the care of your patient.  Please do not hesitate with any questions or concerns.  I will keep you aware of any further encounters with Mey Valle.                  Sincerely yours,           Aamir Nunez, DO

## 2024-08-08 ENCOUNTER — HOSPITAL ENCOUNTER (OUTPATIENT)
Facility: HOSPITAL | Age: 34
Setting detail: HOSPITAL OUTPATIENT SURGERY
Discharge: HOME | End: 2024-08-08
Attending: SURGERY | Admitting: SURGERY
Payer: COMMERCIAL

## 2024-08-08 ENCOUNTER — ANESTHESIA (OUTPATIENT)
Dept: PERIOP | Facility: HOSPITAL | Age: 34
End: 2024-08-08
Payer: COMMERCIAL

## 2024-08-08 ENCOUNTER — APPOINTMENT (OUTPATIENT)
Dept: ULTRASOUND IMAGING | Facility: HOSPITAL | Age: 34
End: 2024-08-08
Payer: COMMERCIAL

## 2024-08-08 ENCOUNTER — ANESTHESIA EVENT (OUTPATIENT)
Dept: PERIOP | Facility: HOSPITAL | Age: 34
End: 2024-08-08
Payer: COMMERCIAL

## 2024-08-08 VITALS
TEMPERATURE: 97 F | SYSTOLIC BLOOD PRESSURE: 122 MMHG | OXYGEN SATURATION: 96 % | RESPIRATION RATE: 18 BRPM | HEART RATE: 70 BPM | DIASTOLIC BLOOD PRESSURE: 82 MMHG

## 2024-08-08 DIAGNOSIS — I87.323 CHRONIC VENOUS HYPERTENSION WITH INFLAMMATION INVOLVING BOTH SIDES: ICD-10-CM

## 2024-08-08 DIAGNOSIS — Z01.818 PREOP TESTING: ICD-10-CM

## 2024-08-08 LAB — B-HCG UR QL: NEGATIVE

## 2024-08-08 PROCEDURE — 25010000002 CEFAZOLIN PER 500 MG: Performed by: NURSE PRACTITIONER

## 2024-08-08 PROCEDURE — 25810000003 LACTATED RINGERS PER 1000 ML: Performed by: SURGERY

## 2024-08-08 PROCEDURE — 25010000002 MIDAZOLAM PER 1 MG: Performed by: ANESTHESIOLOGY

## 2024-08-08 PROCEDURE — 25010000002 FENTANYL CITRATE (PF) 100 MCG/2ML SOLUTION

## 2024-08-08 PROCEDURE — C1888 ENDOVAS NON-CARDIAC ABL CATH: HCPCS | Performed by: SURGERY

## 2024-08-08 PROCEDURE — C1894 INTRO/SHEATH, NON-LASER: HCPCS | Performed by: SURGERY

## 2024-08-08 PROCEDURE — 81025 URINE PREGNANCY TEST: CPT | Performed by: SURGERY

## 2024-08-08 PROCEDURE — 36475 ENDOVENOUS RF 1ST VEIN: CPT | Performed by: SURGERY

## 2024-08-08 PROCEDURE — 76937 US GUIDE VASCULAR ACCESS: CPT

## 2024-08-08 PROCEDURE — 25010000002 PROPOFOL 1000 MG/100ML EMULSION

## 2024-08-08 PROCEDURE — 25810000003 SODIUM CHLORIDE PER 500 ML: Performed by: SURGERY

## 2024-08-08 RX ORDER — MIDAZOLAM HYDROCHLORIDE 1 MG/ML
1 INJECTION INTRAMUSCULAR; INTRAVENOUS
Status: DISCONTINUED | OUTPATIENT
Start: 2024-08-08 | End: 2024-08-08 | Stop reason: HOSPADM

## 2024-08-08 RX ORDER — FENTANYL CITRATE 50 UG/ML
25 INJECTION, SOLUTION INTRAMUSCULAR; INTRAVENOUS
Status: DISCONTINUED | OUTPATIENT
Start: 2024-08-08 | End: 2024-08-08 | Stop reason: HOSPADM

## 2024-08-08 RX ORDER — SODIUM CHLORIDE, SODIUM LACTATE, POTASSIUM CHLORIDE, CALCIUM CHLORIDE 600; 310; 30; 20 MG/100ML; MG/100ML; MG/100ML; MG/100ML
1000 INJECTION, SOLUTION INTRAVENOUS CONTINUOUS
Status: DISCONTINUED | OUTPATIENT
Start: 2024-08-08 | End: 2024-08-08 | Stop reason: HOSPADM

## 2024-08-08 RX ORDER — LABETALOL HYDROCHLORIDE 5 MG/ML
5 INJECTION, SOLUTION INTRAVENOUS
Status: DISCONTINUED | OUTPATIENT
Start: 2024-08-08 | End: 2024-08-08 | Stop reason: HOSPADM

## 2024-08-08 RX ORDER — SODIUM CHLORIDE 0.9 % (FLUSH) 0.9 %
10 SYRINGE (ML) INJECTION AS NEEDED
Status: DISCONTINUED | OUTPATIENT
Start: 2024-08-08 | End: 2024-08-08 | Stop reason: HOSPADM

## 2024-08-08 RX ORDER — ONDANSETRON 2 MG/ML
4 INJECTION INTRAMUSCULAR; INTRAVENOUS ONCE AS NEEDED
Status: DISCONTINUED | OUTPATIENT
Start: 2024-08-08 | End: 2024-08-08 | Stop reason: HOSPADM

## 2024-08-08 RX ORDER — IBUPROFEN 600 MG/1
600 TABLET ORAL EVERY 6 HOURS PRN
Status: DISCONTINUED | OUTPATIENT
Start: 2024-08-08 | End: 2024-08-08 | Stop reason: HOSPADM

## 2024-08-08 RX ORDER — HYDROCODONE BITARTRATE AND ACETAMINOPHEN 10; 325 MG/1; MG/1
1 TABLET ORAL EVERY 4 HOURS PRN
Status: DISCONTINUED | OUTPATIENT
Start: 2024-08-08 | End: 2024-08-08 | Stop reason: HOSPADM

## 2024-08-08 RX ORDER — SODIUM CHLORIDE 0.9 % (FLUSH) 0.9 %
10 SYRINGE (ML) INJECTION EVERY 12 HOURS SCHEDULED
Status: DISCONTINUED | OUTPATIENT
Start: 2024-08-08 | End: 2024-08-08 | Stop reason: HOSPADM

## 2024-08-08 RX ORDER — FLUMAZENIL 0.1 MG/ML
0.2 INJECTION INTRAVENOUS AS NEEDED
Status: DISCONTINUED | OUTPATIENT
Start: 2024-08-08 | End: 2024-08-08 | Stop reason: HOSPADM

## 2024-08-08 RX ORDER — EPHEDRINE SULFATE 50 MG/ML
INJECTION, SOLUTION INTRAVENOUS AS NEEDED
Status: DISCONTINUED | OUTPATIENT
Start: 2024-08-08 | End: 2024-08-08 | Stop reason: SURG

## 2024-08-08 RX ORDER — NALOXONE HCL 0.4 MG/ML
0.4 VIAL (ML) INJECTION AS NEEDED
Status: DISCONTINUED | OUTPATIENT
Start: 2024-08-08 | End: 2024-08-08 | Stop reason: HOSPADM

## 2024-08-08 RX ORDER — PROPOFOL 10 MG/ML
INJECTION, EMULSION INTRAVENOUS CONTINUOUS PRN
Status: DISCONTINUED | OUTPATIENT
Start: 2024-08-08 | End: 2024-08-08 | Stop reason: SURG

## 2024-08-08 RX ORDER — OXYCODONE HYDROCHLORIDE AND ACETAMINOPHEN 5; 325 MG/1; MG/1
1 TABLET ORAL ONCE AS NEEDED
Status: DISCONTINUED | OUTPATIENT
Start: 2024-08-08 | End: 2024-08-08 | Stop reason: HOSPADM

## 2024-08-08 RX ORDER — OXYCODONE HYDROCHLORIDE AND ACETAMINOPHEN 5; 325 MG/1; MG/1
1 TABLET ORAL EVERY 6 HOURS PRN
Qty: 10 TABLET | Refills: 0 | Status: SHIPPED | OUTPATIENT
Start: 2024-08-08

## 2024-08-08 RX ORDER — DEXTROSE MONOHYDRATE 25 G/50ML
12.5 INJECTION, SOLUTION INTRAVENOUS AS NEEDED
Status: DISCONTINUED | OUTPATIENT
Start: 2024-08-08 | End: 2024-08-08 | Stop reason: HOSPADM

## 2024-08-08 RX ORDER — SODIUM CHLORIDE 0.9 % (FLUSH) 0.9 %
3 SYRINGE (ML) INJECTION AS NEEDED
Status: DISCONTINUED | OUTPATIENT
Start: 2024-08-08 | End: 2024-08-08 | Stop reason: HOSPADM

## 2024-08-08 RX ORDER — DROPERIDOL 2.5 MG/ML
0.62 INJECTION, SOLUTION INTRAMUSCULAR; INTRAVENOUS ONCE AS NEEDED
Status: DISCONTINUED | OUTPATIENT
Start: 2024-08-08 | End: 2024-08-08 | Stop reason: HOSPADM

## 2024-08-08 RX ORDER — LIDOCAINE HYDROCHLORIDE 10 MG/ML
0.5 INJECTION, SOLUTION EPIDURAL; INFILTRATION; INTRACAUDAL; PERINEURAL ONCE AS NEEDED
Status: DISCONTINUED | OUTPATIENT
Start: 2024-08-08 | End: 2024-08-08 | Stop reason: HOSPADM

## 2024-08-08 RX ORDER — FENTANYL CITRATE 50 UG/ML
50 INJECTION, SOLUTION INTRAMUSCULAR; INTRAVENOUS
Status: DISCONTINUED | OUTPATIENT
Start: 2024-08-08 | End: 2024-08-08 | Stop reason: HOSPADM

## 2024-08-08 RX ORDER — FENTANYL CITRATE 50 UG/ML
INJECTION, SOLUTION INTRAMUSCULAR; INTRAVENOUS AS NEEDED
Status: DISCONTINUED | OUTPATIENT
Start: 2024-08-08 | End: 2024-08-08 | Stop reason: SURG

## 2024-08-08 RX ORDER — HYDROCODONE BITARTRATE AND ACETAMINOPHEN 5; 325 MG/1; MG/1
1 TABLET ORAL EVERY 4 HOURS PRN
Status: DISCONTINUED | OUTPATIENT
Start: 2024-08-08 | End: 2024-08-08 | Stop reason: HOSPADM

## 2024-08-08 RX ORDER — SODIUM CHLORIDE 9 MG/ML
INJECTION, SOLUTION INTRAVENOUS AS NEEDED
Status: DISCONTINUED | OUTPATIENT
Start: 2024-08-08 | End: 2024-08-08 | Stop reason: HOSPADM

## 2024-08-08 RX ORDER — SODIUM CHLORIDE, SODIUM LACTATE, POTASSIUM CHLORIDE, CALCIUM CHLORIDE 600; 310; 30; 20 MG/100ML; MG/100ML; MG/100ML; MG/100ML
9 INJECTION, SOLUTION INTRAVENOUS CONTINUOUS
Status: DISCONTINUED | OUTPATIENT
Start: 2024-08-08 | End: 2024-08-08 | Stop reason: HOSPADM

## 2024-08-08 RX ADMIN — OXYCODONE HYDROCHLORIDE AND ACETAMINOPHEN 1 TABLET: 5; 325 TABLET ORAL at 09:37

## 2024-08-08 RX ADMIN — MIDAZOLAM HYDROCHLORIDE 1 MG: 2 INJECTION, SOLUTION INTRAMUSCULAR; INTRAVENOUS at 07:42

## 2024-08-08 RX ADMIN — CEFAZOLIN 2000 MG: 2 INJECTION, POWDER, FOR SOLUTION INTRAMUSCULAR; INTRAVENOUS at 08:05

## 2024-08-08 RX ADMIN — EPHEDRINE SULFATE 10 MG: 50 INJECTION INTRAVENOUS at 08:09

## 2024-08-08 RX ADMIN — SODIUM CHLORIDE, POTASSIUM CHLORIDE, SODIUM LACTATE AND CALCIUM CHLORIDE 1000 ML: 600; 310; 30; 20 INJECTION, SOLUTION INTRAVENOUS at 06:47

## 2024-08-08 RX ADMIN — PROPOFOL 150 MCG/KG/MIN: 10 INJECTION, EMULSION INTRAVENOUS at 08:02

## 2024-08-08 RX ADMIN — FENTANYL CITRATE 100 MCG: 50 INJECTION, SOLUTION INTRAMUSCULAR; INTRAVENOUS at 08:14

## 2024-08-08 NOTE — OP NOTE
Mey Valle  8/8/2024     PREOPERATIVE DIAGNOSIS: Chronic venous hypertension with inflammation involving both sides [I87.323]  Preop testing [Z01.818]     POSTOPERATIVE DIAGNOSIS: Post-Op Diagnosis Codes:     * Chronic venous hypertension with inflammation involving both sides [I87.323]     * Preop testing [Z01.818]     PROCEDURE PERFORMED:   1.  Ultrasound-guided cannulation of the left lower extremity greater saphenous and anterior accessory saphenous veins   2.  Radiofrequency ablation of the left lower extremity greater saphenous and anterior accessory saphenous veins     SURGEON: Aamir Nunez DO      ANESTHESIA: MAC    PREPARATION: Routine.    STAFF: Circulator: Dania Jolley RN  Scrub Person: Angelica Mendieta  Assistant: Ev Lugo  Vascular Ultrasound Technician: Jacqui Hernández    Estimated Blood Loss: minimal    SPECIMENS: None    COMPLICATIONS: None    INDICATIONS: Mey Valle is a 34 y.o. female who is here today after undergoing right lower extremity radiofrequency ablation on 7/1/2024.  She is doing well, and feels much better.  She does continue to have discomfort, itching and swelling to her left lower extremity.  She does wear compression stockings on a daily basis.  She denies previous history of DVT. The indications, risks, and possible complications of the procedure were explained to the patient, who voiced understanding and wished to proceed with surgery.     PROCEDURE IN DETAIL:   The patient was taken to the operating room and placed on the operating table in a supine position. After MAC anesthesia was obtained, the left lower extremity was prepped and draped in a sterile manner.  Under ultrasound guidance and using a micropuncture technique the left lower extremity greater saphenous and anterior accessory saphenous veins were cannulated and a microwire was placed.  This was confirmed under ultrasound.  A small stab incision was made with 11 blade and a 7 Lithuan[place] sheath  was placed.  The patient was placed in Trendelenburg position and the saphenofemoral junction was identified under ultrasound.  An 014 advantage Glidewire was used to traverse the tortuosity.  The catheter was placed up to the junction and pulled back 3 cm and marked.  Next, tumescent fluid was instilled along the entire length of both veins under ultrasound guidance.  Once sufficient tumescent fluid was placed radiofrequency ablation had commenced.  There was a total of 10/5 RF cycles for a total treatment length of 59/26.5 cm for a total treatment time of 3 minutes and 20 seconds / 1 minute and 40 seconds.  There was an average of 16/13 W at an average temperature of 119/120°C.  Upon completion of the ablation the catheter and sheath were removed.  Direct pressure was held for an additional 5-10 minutes to ensure hemostasis.  An Ace wrap was placed from the toes to the groin.  Sterile dressings were applied. The patient tolerated the procedure well. Sponge and needle counts were correct. The patient was then awakened and transferred to the outpatient center in stable condition.  Aamir Nunez,   Date: 8/8/2024 Time: 08:57 CDT    CC:RYLEE Waters

## 2024-08-08 NOTE — ANESTHESIA POSTPROCEDURE EVALUATION
Patient: Mey Valle    Procedure Summary       Date: 08/08/24 Room / Location: [facility] OR  /  [facility] OR    Anesthesia Start: 0758 Anesthesia Stop: 0900    Procedure: LEFT LOWER EXTREMITY RADIOFREQUENCY ABLATION OF THE GREATER SAPHENOUS AND ANTERIOR ACCESSORY VEINS (Left: Leg Lower) Diagnosis:       Chronic venous hypertension with inflammation involving both sides      Preop testing      (Chronic venous hypertension with inflammation involving both sides [I87.323])      (Preop testing [Z01.818])    Surgeons: Aamir Nunez DO Provider: Wilmer Anglin CRNA    Anesthesia Type: MAC ASA Status: 3            Anesthesia Type: MAC    Vitals  Vitals Value Taken Time   /60 08/08/24 0906   Temp     Pulse 78 08/08/24 0908   Resp     SpO2 98 % 08/08/24 0908   Vitals shown include unfiled device data.        Post Anesthesia Care and Evaluation    Patient location during evaluation: PHASE II  Patient participation: complete - patient participated  Level of consciousness: awake and alert  Pain management: adequate    Airway patency: patent  Anesthetic complications: No anesthetic complications  PONV Status: none  Cardiovascular status: acceptable  Respiratory status: acceptable  Hydration status: acceptable    Comments: Blood pressure 129/78, pulse 68, temperature 97.3 °F (36.3 °C), temperature source Temporal, resp. rate 18, SpO2 100%, not currently breastfeeding.    No anesthesia care post op

## 2024-08-14 ENCOUNTER — TELEPHONE (OUTPATIENT)
Dept: VASCULAR SURGERY | Facility: CLINIC | Age: 34
End: 2024-08-14
Payer: COMMERCIAL

## 2024-08-15 ENCOUNTER — OFFICE VISIT (OUTPATIENT)
Dept: VASCULAR SURGERY | Facility: CLINIC | Age: 34
End: 2024-08-15
Payer: COMMERCIAL

## 2024-08-15 ENCOUNTER — HOSPITAL ENCOUNTER (OUTPATIENT)
Dept: ULTRASOUND IMAGING | Facility: HOSPITAL | Age: 34
Discharge: HOME | End: 2024-08-15
Admitting: SURGERY
Payer: COMMERCIAL

## 2024-08-15 VITALS
HEART RATE: 105 BPM | SYSTOLIC BLOOD PRESSURE: 132 MMHG | DIASTOLIC BLOOD PRESSURE: 80 MMHG | HEIGHT: 69 IN | OXYGEN SATURATION: 98 % | WEIGHT: 293 LBS | BODY MASS INDEX: 43.4 KG/M2

## 2024-08-15 DIAGNOSIS — I87.323 CHRONIC VENOUS HYPERTENSION WITH INFLAMMATION INVOLVING BOTH SIDES: Primary | ICD-10-CM

## 2024-08-15 DIAGNOSIS — I87.323 CHRONIC VENOUS HYPERTENSION WITH INFLAMMATION INVOLVING BOTH SIDES: ICD-10-CM

## 2024-08-15 PROCEDURE — 93971 EXTREMITY STUDY: CPT

## 2024-08-15 NOTE — PROGRESS NOTES
"8/15/2024        Lela Daugherty APRN  [address] [facility] [place] [address]      Mey Valle  1990    Chief Complaint   Patient presents with    Follow-up     1 week post op. Left RFA done 8/8/24. No issues stated.        Dear RYLEE Waters:    I had the pleasure of seeing your patient Mey Valle in the office today.  As you recall, Mey Valle is a 34 y.o.  female who you are currently following for routine health maintenance.  She is here today after undergoing right lower extremity radiofrequency ablation on 08/08/24.  She is doing well, and only complains of some tenderness to her upper thigh area.  She also had RLE RFA on 07/01/24.  Her right leg feels great.  She did have noninvasive testing performed today, which I did review in office.         Review of Systems   Constitutional: Negative.  Negative for diaphoresis and fever.   HENT: Negative.     Eyes: Negative.    Respiratory: Negative.  Negative for shortness of breath and wheezing.    Cardiovascular:  Positive for leg swelling.   Gastrointestinal: Negative.  Negative for abdominal pain.   Endocrine: Negative.    Genitourinary: Negative.    Musculoskeletal: Negative.  Negative for gait problem.   Skin:  Positive for color change.   Allergic/Immunologic: Negative.    Neurological: Negative.  Negative for dizziness and numbness.   Hematological: Negative.    Psychiatric/Behavioral: Negative.       /80   Pulse 105   Ht 175.3 cm (69\")   Wt (!) 137 kg (302 lb)   SpO2 98%   BMI 44.60 kg/m²     Physical Exam  Vitals and nursing note reviewed.   Constitutional:       General: She is not in acute distress.     Appearance: Normal appearance. She is morbidly obese. She is not diaphoretic.   HENT:      Head: Normocephalic. No right periorbital erythema or left periorbital erythema.      Nose: Nose normal.   Eyes:      General: No scleral icterus.     Pupils: Pupils are equal.   Cardiovascular:      Rate and Rhythm: Normal " "rate and regular rhythm.      Pulses: Normal pulses.      Heart sounds: Normal heart sounds. No murmur heard.     Comments: Left lower extremity incisions healed.  Some bruising noted.      Pulmonary:      Effort: Pulmonary effort is normal. No respiratory distress.      Breath sounds: Normal breath sounds.   Abdominal:      General: Bowel sounds are normal. There is no distension.      Palpations: Abdomen is soft.      Tenderness: There is no abdominal tenderness. There is no guarding.   Musculoskeletal:         General: No swelling or tenderness. Normal range of motion.      Cervical back: Normal range of motion and neck supple.      Right lower leg: No edema.      Left lower leg: No edema.   Feet:      Right foot:      Skin integrity: Skin integrity normal.      Left foot:      Skin integrity: Skin integrity normal.   Skin:     General: Skin is warm and dry.      Findings: No erythema or rash.   Neurological:      General: No focal deficit present.      Mental Status: She is alert and oriented to person, place, and time. Mental status is at baseline.      Cranial Nerves: No cranial nerve deficit.      Gait: Gait normal.   Psychiatric:         Attention and Perception: Attention normal.         Mood and Affect: Mood normal.         Behavior: Behavior normal.         Thought Content: Thought content normal.         Judgment: Judgment normal.   DIAGNOSTIC DATA  Jacqui Hernández on 8/15/2024  3:32 PM CDT   LLE s/p RFA: no evidence of DVT  GSV closed 4.5cm from CFV through dist calf.  Ant Acc closed 1.7cm from CFV through dist thigh.         /80   Pulse 105   Ht 175.3 cm (69\")   Wt (!) 137 kg (302 lb)   SpO2 98%   BMI 44.60 kg/m²           Patient Active Problem List   Diagnosis    Calculus of gallbladder without cholecystitis without obstruction    Cellulitis left axilla    Left axillary abscess    Class 3 severe obesity with body mass index (BMI) of 40.0 to 44.9 in adult    Anxiety and depression    " Chronic venous hypertension with inflammation involving both sides    Preop testing         ICD-10-CM ICD-9-CM   1. Chronic venous hypertension with inflammation involving both sides  I87.323 459.32             Plan: After thoroughly evaluating Mey Valle, I believe the best course of action is to remain conservative from vascular surgery standpoint.  Overall she is doing well after undergoing left lower extremity radiofrequency ablation, she does have a little tenderness in her left upper thigh with some bruising to the area.  Her right leg is doing well and she is pleased with the outcome.  We will see her back in 6 months for recheck.  I would like her to continue wearing compression stockings on a daily basis, elevating her legs when not on them.  Her blood pressure stable today in office.  She can continue her medications as previously planned.  This was all discussed in full with complete understanding.     Thank you for allowing me to participate in the care of your patient.  Please do not hesitate with any questions or concerns.  I will keep you aware of any further encounters with Mey Valle.             Sincerely yours,         RYLEE Lane

## 2024-09-03 ENCOUNTER — TELEPHONE (OUTPATIENT)
Dept: PSYCHIATRY | Age: 34
End: 2024-09-03

## 2024-09-03 NOTE — TELEPHONE ENCOUNTER
Called pt to cancel/reschedule appt for 09/25/24 with Mimi Lopez because the provider will not be in the office that day    Rescheduled for 10/03/24 @ 3:00.    Electronically signed by Aster Multani MA on 9/3/2024 at 3:48 PM

## 2024-09-12 ENCOUNTER — OFFICE VISIT (OUTPATIENT)
Dept: PRIMARY CARE CLINIC | Age: 34
End: 2024-09-12
Payer: COMMERCIAL

## 2024-09-12 VITALS
HEIGHT: 69 IN | HEART RATE: 98 BPM | OXYGEN SATURATION: 98 % | BODY MASS INDEX: 43.4 KG/M2 | SYSTOLIC BLOOD PRESSURE: 112 MMHG | WEIGHT: 293 LBS | TEMPERATURE: 98.5 F | DIASTOLIC BLOOD PRESSURE: 72 MMHG

## 2024-09-12 DIAGNOSIS — F41.8 DEPRESSION WITH ANXIETY: ICD-10-CM

## 2024-09-12 DIAGNOSIS — G47.00 INSOMNIA, UNSPECIFIED TYPE: ICD-10-CM

## 2024-09-12 DIAGNOSIS — Z00.00 WELL ADULT EXAM: Primary | ICD-10-CM

## 2024-09-12 DIAGNOSIS — F41.9 ANXIETY: ICD-10-CM

## 2024-09-12 PROCEDURE — 3074F SYST BP LT 130 MM HG: CPT | Performed by: NURSE PRACTITIONER

## 2024-09-12 PROCEDURE — 99395 PREV VISIT EST AGE 18-39: CPT | Performed by: NURSE PRACTITIONER

## 2024-09-12 PROCEDURE — 3078F DIAST BP <80 MM HG: CPT | Performed by: NURSE PRACTITIONER

## 2024-09-12 RX ORDER — TIRZEPATIDE 2.5 MG/.5ML
2.5 INJECTION, SOLUTION SUBCUTANEOUS WEEKLY
Qty: 2 ML | Refills: 0 | Status: SHIPPED | OUTPATIENT
Start: 2024-09-12

## 2024-09-12 RX ORDER — BUPROPION HYDROCHLORIDE 150 MG/1
150 TABLET ORAL EVERY MORNING
Qty: 30 TABLET | Refills: 3 | Status: SHIPPED | OUTPATIENT
Start: 2024-09-12

## 2024-09-12 RX ORDER — DOXEPIN HYDROCHLORIDE 25 MG/1
25-50 CAPSULE ORAL NIGHTLY
Qty: 45 CAPSULE | Refills: 0 | Status: SHIPPED | OUTPATIENT
Start: 2024-09-12

## 2024-09-12 RX ORDER — CHLORAL HYDRATE 500 MG
CAPSULE ORAL
COMMUNITY

## 2024-09-12 RX ORDER — MULTIVITAMIN WITH IRON
TABLET ORAL DAILY
COMMUNITY

## 2024-09-12 SDOH — ECONOMIC STABILITY: FOOD INSECURITY: WITHIN THE PAST 12 MONTHS, YOU WORRIED THAT YOUR FOOD WOULD RUN OUT BEFORE YOU GOT MONEY TO BUY MORE.: NEVER TRUE

## 2024-09-12 SDOH — ECONOMIC STABILITY: FOOD INSECURITY: WITHIN THE PAST 12 MONTHS, THE FOOD YOU BOUGHT JUST DIDN'T LAST AND YOU DIDN'T HAVE MONEY TO GET MORE.: NEVER TRUE

## 2024-09-12 SDOH — ECONOMIC STABILITY: INCOME INSECURITY: HOW HARD IS IT FOR YOU TO PAY FOR THE VERY BASICS LIKE FOOD, HOUSING, MEDICAL CARE, AND HEATING?: NOT HARD AT ALL

## 2024-09-12 ASSESSMENT — ENCOUNTER SYMPTOMS
EYES NEGATIVE: 1
GASTROINTESTINAL NEGATIVE: 1
RESPIRATORY NEGATIVE: 1

## 2024-09-13 ENCOUNTER — TELEPHONE (OUTPATIENT)
Dept: PSYCHIATRY | Age: 34
End: 2024-09-13

## 2024-09-16 ENCOUNTER — PATIENT MESSAGE (OUTPATIENT)
Dept: PRIMARY CARE CLINIC | Age: 34
End: 2024-09-16

## 2024-09-16 ENCOUNTER — OFFICE VISIT (OUTPATIENT)
Dept: PSYCHIATRY | Age: 34
End: 2024-09-16
Payer: COMMERCIAL

## 2024-09-16 DIAGNOSIS — F41.1 GENERALIZED ANXIETY DISORDER: ICD-10-CM

## 2024-09-16 DIAGNOSIS — F33.0 MILD RECURRENT MAJOR DEPRESSION (HCC): Primary | ICD-10-CM

## 2024-09-16 DIAGNOSIS — Z13.1 SCREENING FOR DIABETES MELLITUS: ICD-10-CM

## 2024-09-16 DIAGNOSIS — Z13.220 ENCOUNTER FOR SCREENING FOR LIPID DISORDER: Primary | ICD-10-CM

## 2024-09-16 PROCEDURE — 90837 PSYTX W PT 60 MINUTES: CPT | Performed by: COUNSELOR

## 2024-09-16 ASSESSMENT — ANXIETY QUESTIONNAIRES
6. BECOMING EASILY ANNOYED OR IRRITABLE: MORE THAN HALF THE DAYS
2. NOT BEING ABLE TO STOP OR CONTROL WORRYING: MORE THAN HALF THE DAYS
IF YOU CHECKED OFF ANY PROBLEMS ON THIS QUESTIONNAIRE, HOW DIFFICULT HAVE THESE PROBLEMS MADE IT FOR YOU TO DO YOUR WORK, TAKE CARE OF THINGS AT HOME, OR GET ALONG WITH OTHER PEOPLE: SOMEWHAT DIFFICULT
1. FEELING NERVOUS, ANXIOUS, OR ON EDGE: MORE THAN HALF THE DAYS
3. WORRYING TOO MUCH ABOUT DIFFERENT THINGS: MORE THAN HALF THE DAYS
GAD7 TOTAL SCORE: 12
7. FEELING AFRAID AS IF SOMETHING AWFUL MIGHT HAPPEN: SEVERAL DAYS
5. BEING SO RESTLESS THAT IT IS HARD TO SIT STILL: SEVERAL DAYS
4. TROUBLE RELAXING: MORE THAN HALF THE DAYS

## 2024-09-16 ASSESSMENT — PATIENT HEALTH QUESTIONNAIRE - PHQ9
SUM OF ALL RESPONSES TO PHQ QUESTIONS 1-9: 5
8. MOVING OR SPEAKING SO SLOWLY THAT OTHER PEOPLE COULD HAVE NOTICED. OR THE OPPOSITE, BEING SO FIGETY OR RESTLESS THAT YOU HAVE BEEN MOVING AROUND A LOT MORE THAN USUAL: NOT AT ALL
1. LITTLE INTEREST OR PLEASURE IN DOING THINGS: SEVERAL DAYS
SUM OF ALL RESPONSES TO PHQ QUESTIONS 1-9: 5
5. POOR APPETITE OR OVEREATING: SEVERAL DAYS
4. FEELING TIRED OR HAVING LITTLE ENERGY: SEVERAL DAYS
3. TROUBLE FALLING OR STAYING ASLEEP: NOT AT ALL
SUM OF ALL RESPONSES TO PHQ QUESTIONS 1-9: 5
SUM OF ALL RESPONSES TO PHQ9 QUESTIONS 1 & 2: 2
2. FEELING DOWN, DEPRESSED OR HOPELESS: SEVERAL DAYS
6. FEELING BAD ABOUT YOURSELF - OR THAT YOU ARE A FAILURE OR HAVE LET YOURSELF OR YOUR FAMILY DOWN: NOT AT ALL
9. THOUGHTS THAT YOU WOULD BE BETTER OFF DEAD, OR OF HURTING YOURSELF: NOT AT ALL
10. IF YOU CHECKED OFF ANY PROBLEMS, HOW DIFFICULT HAVE THESE PROBLEMS MADE IT FOR YOU TO DO YOUR WORK, TAKE CARE OF THINGS AT HOME, OR GET ALONG WITH OTHER PEOPLE: SOMEWHAT DIFFICULT
SUM OF ALL RESPONSES TO PHQ QUESTIONS 1-9: 5
7. TROUBLE CONCENTRATING ON THINGS, SUCH AS READING THE NEWSPAPER OR WATCHING TELEVISION: SEVERAL DAYS

## 2024-09-17 DIAGNOSIS — Z13.220 ENCOUNTER FOR SCREENING FOR LIPID DISORDER: ICD-10-CM

## 2024-09-17 DIAGNOSIS — Z13.1 SCREENING FOR DIABETES MELLITUS: ICD-10-CM

## 2024-09-17 LAB
CHOLEST SERPL-MCNC: 166 MG/DL (ref 0–199)
HBA1C MFR BLD: 5 % (ref 4–5.6)
HDLC SERPL-MCNC: 43 MG/DL (ref 40–60)
LDLC SERPL CALC-MCNC: 108 MG/DL
TRIGL SERPL-MCNC: 75 MG/DL (ref 0–149)

## 2024-09-30 ENCOUNTER — PATIENT MESSAGE (OUTPATIENT)
Dept: PRIMARY CARE CLINIC | Age: 34
End: 2024-09-30

## 2024-09-30 DIAGNOSIS — F41.9 ANXIETY: ICD-10-CM

## 2024-09-30 RX ORDER — LORAZEPAM 1 MG/1
1 TABLET ORAL 2 TIMES DAILY PRN
Qty: 60 TABLET | Refills: 0 | Status: SHIPPED | OUTPATIENT
Start: 2024-09-30 | End: 2024-10-30

## 2024-09-30 NOTE — TELEPHONE ENCOUNTER
PDMP Monitoring:    Last PDMP Manoj as Reviewed ([place]):  Review User Review Instant Review Result   ABHISHEK BAZAN 9/12/2024  2:05 PM Reviewed PDMP [1]     Urine Drug Screenings (1 yr)       Urine Drug Screen  Collected: 5/3/2020  8:10 PM (Final result)              Urine Drug Screen  Collected: 10/29/2018  9:30 PM (Final result)                  Medication Contract and Consent for Opioid Use Documents Filed       Patient Documents       Type of Document Status Date Received Received By Description    Medication Contract Received 7/18/2023 12:14 PM CLEMENTINA COTTON Medication Contract    Medication Contract Received 8/8/2023  3:03 PM TIFFANIE SMITH

## 2024-10-02 ENCOUNTER — TELEPHONE (OUTPATIENT)
Dept: PSYCHIATRY | Age: 34
End: 2024-10-02

## 2024-10-02 NOTE — TELEPHONE ENCOUNTER
Called patient to remind them of their appointment   -left voicemail, requesting a return call  Reminded patient to complete their visit pre-check/digital registration in [employer].    Electronically signed by Aster Multani MA on 10/2/2024 at 3:35 PM

## 2024-10-02 NOTE — TELEPHONE ENCOUNTER
Patient called requesting to cancel 10/03/2024 appointment with KIMBERLEY Lopez. Patient cancelled and will call back at a later time to reschedule.

## 2024-10-22 DIAGNOSIS — G47.00 INSOMNIA, UNSPECIFIED TYPE: ICD-10-CM

## 2024-10-22 RX ORDER — DOXEPIN HYDROCHLORIDE 25 MG/1
25-50 CAPSULE ORAL NIGHTLY
Qty: 45 CAPSULE | Refills: 0 | Status: SHIPPED | OUTPATIENT
Start: 2024-10-22

## 2024-11-20 ENCOUNTER — TELEPHONE (OUTPATIENT)
Dept: PSYCHIATRY | Age: 34
End: 2024-11-20

## 2024-11-20 NOTE — TELEPHONE ENCOUNTER
Called patient to remind them of their appointment   -left voicemail, requesting a return call      Reminded patient of their     copay for their appointment. Reminded patient to complete their visit pre-check/digital registration in [place].    Electronically signed by Alicia D Giraldo-Reyes on 11/20/2024 at 11:37 AM

## 2024-11-21 ENCOUNTER — TELEPHONE (OUTPATIENT)
Dept: PSYCHIATRY | Age: 34
End: 2024-11-21

## 2024-11-21 NOTE — TELEPHONE ENCOUNTER
[facility] no showed [facility] Office called the patient to check on them and to reschedule their appointment.     Left voicemail for patient to call the office back at [phone] Option 3 and provided education regarding the three no show and dismissal policies. Provided education that after three no show, patients can be dismissed by the provider and practice.  .     Barriers to treatment: N/A - didn't speak to patient.      Electronically signed by Peng Ricks on 11/21/2024 at 3:15 PM

## 2024-12-16 ENCOUNTER — TELEPHONE (OUTPATIENT)
Dept: PSYCHIATRY | Age: 34
End: 2024-12-16

## 2024-12-16 NOTE — TELEPHONE ENCOUNTER
Called and lvm reminding pt of her appt for 12/17 @ 10 am    Electronically signed by Peng Ricks on 12/16/2024 at 11:15 AM

## 2024-12-17 ENCOUNTER — TELEPHONE (OUTPATIENT)
Dept: PSYCHIATRY | Age: 34
End: 2024-12-17

## 2024-12-17 NOTE — TELEPHONE ENCOUNTER
Patient no showed to their [facility]. Office called the patient to check on them and to reschedule their appointment.     Left voicemail for patient to call the office back at [phone] Option 3 and provided education regarding the three no show and dismissal policies. Provided education that after three no show, patients can be dismissed by the provider and practice.    Discussed the three late cancellation and dismissal policies with the patient. Provided education that after three no show, patients can be dismissed by the provider and practice.     Barriers to treatment: N/A - didn't speak to patient.    Electronically signed by Aster Multani MA on 12/17/2024 at 3:28 PM

## 2025-01-28 RX ORDER — TIRZEPATIDE 2.5 MG/.5ML
2.5 INJECTION, SOLUTION SUBCUTANEOUS WEEKLY
Qty: 2 ML | Refills: 0 | Status: SHIPPED | OUTPATIENT
Start: 2025-01-28 | End: 2025-01-28 | Stop reason: SDUPTHER

## 2025-01-28 RX ORDER — TIRZEPATIDE 2.5 MG/.5ML
2.5 INJECTION, SOLUTION SUBCUTANEOUS WEEKLY
Qty: 2 ML | Refills: 0 | Status: SHIPPED | OUTPATIENT
Start: 2025-01-28

## 2025-02-14 ENCOUNTER — TELEPHONE (OUTPATIENT)
Dept: VASCULAR SURGERY | Facility: CLINIC | Age: 35
End: 2025-02-14
Payer: COMMERCIAL

## 2025-02-17 ENCOUNTER — OFFICE VISIT (OUTPATIENT)
Dept: VASCULAR SURGERY | Facility: CLINIC | Age: 35
End: 2025-02-17
Payer: COMMERCIAL

## 2025-02-17 VITALS
WEIGHT: 293 LBS | BODY MASS INDEX: 43.4 KG/M2 | HEIGHT: 69 IN | DIASTOLIC BLOOD PRESSURE: 68 MMHG | HEART RATE: 78 BPM | OXYGEN SATURATION: 99 % | SYSTOLIC BLOOD PRESSURE: 116 MMHG

## 2025-02-17 DIAGNOSIS — I87.323 CHRONIC VENOUS HYPERTENSION WITH INFLAMMATION INVOLVING BOTH SIDES: Primary | ICD-10-CM

## 2025-02-17 PROBLEM — I83.893 VARICOSE VEINS OF BILATERAL LOWER EXTREMITIES WITH OTHER COMPLICATIONS: Status: ACTIVE | Noted: 2019-06-27

## 2025-02-17 PROBLEM — Q07.00 ARNOLD-CHIARI MALFORMATION: Status: ACTIVE | Noted: 2025-02-17

## 2025-02-17 PROBLEM — F41.8 DEPRESSION WITH ANXIETY: Status: ACTIVE | Noted: 2017-05-01

## 2025-02-17 PROBLEM — I83.93 SPIDER VEINS OF BOTH LOWER EXTREMITIES: Status: ACTIVE | Noted: 2019-06-27

## 2025-02-17 PROBLEM — I10 HYPERTENSION: Status: ACTIVE | Noted: 2018-10-24

## 2025-02-17 PROBLEM — F41.9 ANXIETY: Status: ACTIVE | Noted: 2017-01-10

## 2025-02-17 PROBLEM — O99.210 OBESITY AFFECTING PREGNANCY, ANTEPARTUM: Status: RESOLVED | Noted: 2019-12-18 | Resolved: 2025-02-17

## 2025-02-17 PROBLEM — O09.299 HISTORY OF MISCARRIAGE, CURRENTLY PREGNANT: Status: RESOLVED | Noted: 2020-01-23 | Resolved: 2025-02-17

## 2025-02-17 PROCEDURE — 99213 OFFICE O/P EST LOW 20 MIN: CPT | Performed by: NURSE PRACTITIONER

## 2025-02-17 RX ORDER — BUPROPION HYDROCHLORIDE 150 MG/1
300 TABLET ORAL EVERY MORNING
COMMUNITY

## 2025-02-17 NOTE — PROGRESS NOTES
"2/17/2025        Lela Daugherty APRN  [address] [facility] [place] [address]      Mey Valle  1990    Chief Complaint   Patient presents with    Chronic venous hypertension with inflammation involving bot     Has been having some pain in right leg, no other complaints       Dear RYLEE Waters:    I had the pleasure of seeing your patient Mey Valle in the office today.  As you recall, Mey Valle is a 34 y.o.  female who you are currently following for routine health maintenance.  She is here today for follow-up.  She underwent left lower extremity radiofrequency ablation on 8/8/2024 and right lower extremity radiofrequency ablation on 7/1/2024.  Today she complains of numbness to her anterior right ankle area right below a cluster of veins and pain and swelling to the posterior knee area of her right lower extremity.  She does wear compression stockings on a daily basis.    Review of Systems   Constitutional: Negative.  Negative for diaphoresis and fever.   HENT: Negative.     Eyes: Negative.    Respiratory: Negative.  Negative for shortness of breath and wheezing.    Cardiovascular:  Positive for leg swelling (Right lower extremity).   Gastrointestinal: Negative.  Negative for abdominal pain.   Endocrine: Negative.    Genitourinary: Negative.    Musculoskeletal: Negative.  Negative for gait problem.   Skin: Negative.    Allergic/Immunologic: Negative.    Neurological:  Positive for numbness (Right lower extremity). Negative for dizziness.   Hematological: Negative.    Psychiatric/Behavioral: Negative.       /68   Pulse 78   Ht 175.3 cm (69\")   Wt (!) 148 kg (327 lb)   SpO2 99%   BMI 48.29 kg/m²     Physical Exam  Vitals and nursing note reviewed.   Constitutional:       General: She is not in acute distress.     Appearance: Normal appearance. She is morbidly obese. She is not diaphoretic.   HENT:      Head: Normocephalic. No right periorbital erythema or left " periorbital erythema.      Nose: Nose normal.   Eyes:      General: No scleral icterus.     Pupils: Pupils are equal.   Cardiovascular:      Rate and Rhythm: Normal rate and regular rhythm.      Pulses: Normal pulses.      Heart sounds: Normal heart sounds. No murmur heard.  Pulmonary:      Effort: Pulmonary effort is normal. No respiratory distress.      Breath sounds: Normal breath sounds.   Abdominal:      General: Bowel sounds are normal. There is no distension.      Palpations: Abdomen is soft.      Tenderness: There is no abdominal tenderness. There is no guarding.   Musculoskeletal:         General: No swelling or tenderness. Normal range of motion.      Cervical back: Normal range of motion and neck supple.      Right lower leg: No edema.      Left lower leg: No edema.   Feet:      Right foot:      Skin integrity: Skin integrity normal.      Left foot:      Skin integrity: Skin integrity normal.   Skin:     General: Skin is warm and dry.      Findings: No erythema or rash.             Comments: Cluster of varicose veins  Swelling and pain to right posterior lower extremity   Neurological:      General: No focal deficit present.      Mental Status: She is alert and oriented to person, place, and time. Mental status is at baseline.      Cranial Nerves: No cranial nerve deficit.      Gait: Gait normal.   Psychiatric:         Attention and Perception: Attention normal.         Mood and Affect: Mood normal.         Behavior: Behavior normal.         Thought Content: Thought content normal.         Judgment: Judgment normal.     DIAGNOSTIC DATA          Patient Active Problem List   Diagnosis    Calculus of gallbladder without cholecystitis without obstruction    Cellulitis left axilla    Left axillary abscess    Class 3 severe obesity with body mass index (BMI) of 40.0 to 44.9 in adult    Depression with anxiety    Chronic venous hypertension with inflammation involving both sides    Preop testing    Anxiety     Arnold-Chiari malformation    Hypertension    Varicose veins of bilateral lower extremities with other complications    Spider veins of both lower extremities         ICD-10-CM ICD-9-CM   1. Chronic venous hypertension with inflammation involving both sides  I87.323 459.32               Plan: After thoroughly evaluating Mey Valle, I believe the best course of action is to remain conservative from vascular surgery standpoint.  We will see her back in 2 weeks with noninvasive testing to include VVI for further surveillance.  She has complaints of numbness to the anterior right lower extremity below a cluster of varicose veins and pain and swelling that began a few days ago to the posterior knee area.  She does continue to wear compression stockings on a daily basis.  Her left leg is doing well and she has no complaints in regard to it.  Her blood pressure stable today in office.  She can continue her medications as previously planned.  Body mass index is 48.29 kg/m².        This was all discussed in full with complete understanding.     Thank you for allowing me to participate in the care of your patient.  Please do not hesitate with any questions or concerns.  I will keep you aware of any further encounters with Mey Valle.       Sincerely yours,         RYLEE Lane

## 2025-02-27 ENCOUNTER — TELEPHONE (OUTPATIENT)
Dept: VASCULAR SURGERY | Facility: CLINIC | Age: 35
End: 2025-02-27
Payer: COMMERCIAL

## 2025-03-25 ENCOUNTER — TELEPHONE (OUTPATIENT)
Dept: VASCULAR SURGERY | Facility: CLINIC | Age: 35
End: 2025-03-25
Payer: COMMERCIAL

## 2025-04-13 PROBLEM — R30.0 DYSURIA: Status: ACTIVE | Noted: 2025-04-13

## 2025-04-13 PROBLEM — N39.0 ACUTE UTI: Status: ACTIVE | Noted: 2025-04-13

## 2025-04-13 PROCEDURE — 87086 URINE CULTURE/COLONY COUNT: CPT | Performed by: NURSE PRACTITIONER

## 2025-04-17 ENCOUNTER — TRANSCRIBE ORDERS (OUTPATIENT)
Dept: ADMINISTRATIVE | Facility: HOSPITAL | Age: 35
End: 2025-04-17
Payer: COMMERCIAL

## 2025-04-17 ENCOUNTER — LAB (OUTPATIENT)
Dept: LAB | Facility: HOSPITAL | Age: 35
End: 2025-04-17
Payer: COMMERCIAL

## 2025-04-17 DIAGNOSIS — R11.2 NAUSEA AND VOMITING, UNSPECIFIED VOMITING TYPE: Primary | ICD-10-CM

## 2025-04-17 DIAGNOSIS — R10.9 STOMACH ACHE: ICD-10-CM

## 2025-04-17 DIAGNOSIS — R11.2 NAUSEA AND VOMITING, UNSPECIFIED VOMITING TYPE: ICD-10-CM

## 2025-04-17 LAB
ALBUMIN SERPL-MCNC: 4.6 G/DL (ref 3.5–5)
ALBUMIN/GLOB SERPL: 1.6 G/DL (ref 1.1–2.5)
ALP SERPL-CCNC: 49 U/L (ref 24–120)
ALT SERPL W P-5'-P-CCNC: 33 U/L (ref 0–35)
AMYLASE SERPL-CCNC: 56 U/L (ref 30–110)
ANION GAP SERPL CALCULATED.3IONS-SCNC: 6 MMOL/L (ref 4–13)
AST SERPL-CCNC: 25 U/L (ref 7–45)
AUTO MIXED CELLS #: 0.5 10*3/MM3 (ref 0.1–2.6)
AUTO MIXED CELLS %: 7.1 % (ref 0.1–24)
BILIRUB SERPL-MCNC: 0.4 MG/DL (ref 0.1–1)
BUN SERPL-MCNC: 10 MG/DL (ref 5–21)
BUN/CREAT SERPL: 12.5
CALCIUM SPEC-SCNC: 9.4 MG/DL (ref 8.6–10.5)
CHLORIDE SERPL-SCNC: 107 MMOL/L (ref 98–110)
CO2 SERPL-SCNC: 24 MMOL/L (ref 24–31)
CREAT SERPL-MCNC: 0.8 MG/DL (ref 0.5–1.4)
EGFRCR SERPLBLD CKD-EPI 2021: 99.9 ML/MIN/1.73
ERYTHROCYTE [DISTWIDTH] IN BLOOD BY AUTOMATED COUNT: 12.9 % (ref 12.3–15.4)
GLOBULIN UR ELPH-MCNC: 2.8 GM/DL
GLUCOSE SERPL-MCNC: 101 MG/DL (ref 65–99)
HCT VFR BLD AUTO: 40.1 % (ref 34–46.6)
HGB BLD-MCNC: 13.3 G/DL (ref 12–15.9)
LIPASE SERPL-CCNC: 176 U/L (ref 23–203)
LYMPHOCYTES # BLD AUTO: 1.9 10*3/MM3 (ref 0.7–3.1)
LYMPHOCYTES NFR BLD AUTO: 29.5 % (ref 19.6–45.3)
MCH RBC QN AUTO: 28.7 PG (ref 26.6–33)
MCHC RBC AUTO-ENTMCNC: 33.2 G/DL (ref 31.5–35.7)
MCV RBC AUTO: 86.4 FL (ref 79–97)
NEUTROPHILS NFR BLD AUTO: 4.2 10*3/MM3 (ref 1.7–7)
NEUTROPHILS NFR BLD AUTO: 63.4 % (ref 42.7–76)
PLATELET # BLD AUTO: 329 10*3/MM3 (ref 140–450)
PMV BLD AUTO: 10.2 FL (ref 6–12)
POTASSIUM SERPL-SCNC: 4.1 MMOL/L (ref 3.5–5.3)
PROT SERPL-MCNC: 7.4 G/DL (ref 6.3–8.7)
RBC # BLD AUTO: 4.64 10*6/MM3 (ref 3.77–5.28)
SODIUM SERPL-SCNC: 137 MMOL/L (ref 135–145)
WBC NRBC COR # BLD AUTO: 6.6 10*3/MM3 (ref 3.4–10.8)

## 2025-04-17 PROCEDURE — 82150 ASSAY OF AMYLASE: CPT

## 2025-04-17 PROCEDURE — 83690 ASSAY OF LIPASE: CPT

## 2025-04-17 PROCEDURE — 80053 COMPREHEN METABOLIC PANEL: CPT

## 2025-04-17 PROCEDURE — 36415 COLL VENOUS BLD VENIPUNCTURE: CPT

## 2025-04-17 PROCEDURE — 85025 COMPLETE CBC W/AUTO DIFF WBC: CPT

## 2025-08-08 ENCOUNTER — OFFICE VISIT (OUTPATIENT)
Dept: OBGYN CLINIC | Age: 35
End: 2025-08-08
Payer: COMMERCIAL

## 2025-08-08 VITALS
BODY MASS INDEX: 43.4 KG/M2 | DIASTOLIC BLOOD PRESSURE: 75 MMHG | HEIGHT: 69 IN | SYSTOLIC BLOOD PRESSURE: 113 MMHG | WEIGHT: 293 LBS | HEART RATE: 101 BPM

## 2025-08-08 DIAGNOSIS — Z11.51 SCREENING FOR HPV (HUMAN PAPILLOMAVIRUS): ICD-10-CM

## 2025-08-08 DIAGNOSIS — Z12.4 SCREENING FOR CERVICAL CANCER: ICD-10-CM

## 2025-08-08 DIAGNOSIS — Z12.39 ENCOUNTER FOR SCREENING BREAST EXAMINATION: ICD-10-CM

## 2025-08-08 DIAGNOSIS — Z01.419 ENCOUNTER FOR WELL WOMAN EXAM WITH ROUTINE GYNECOLOGICAL EXAM: Primary | ICD-10-CM

## 2025-08-08 PROCEDURE — 99395 PREV VISIT EST AGE 18-39: CPT

## 2025-08-08 PROCEDURE — 3078F DIAST BP <80 MM HG: CPT

## 2025-08-08 PROCEDURE — 3074F SYST BP LT 130 MM HG: CPT

## 2025-08-11 ASSESSMENT — ENCOUNTER SYMPTOMS
ABDOMINAL PAIN: 0
CONSTIPATION: 0
BACK PAIN: 0
NAUSEA: 0
DIARRHEA: 0
RECTAL PAIN: 0
GASTROINTESTINAL NEGATIVE: 1
CHEST TIGHTNESS: 0
SHORTNESS OF BREATH: 0
RESPIRATORY NEGATIVE: 1

## 2025-08-15 LAB
HPV HR 12 DNA SPEC QL NAA+PROBE: NOT DETECTED
HPV16 DNA SPEC QL NAA+PROBE: NOT DETECTED
HPV16+18+H RISK 12 DNA SPEC-IMP: NORMAL
HPV18 DNA SPEC QL NAA+PROBE: NOT DETECTED

## (undated) DEVICE — PAD LAP CHOLE: Brand: MEDLINE INDUSTRIES, INC.

## (undated) DEVICE — CLIP APPLIER: Brand: ENDO CLIP

## (undated) DEVICE — NEEDLE, QUINCKE, 20GX3.5": Brand: MEDLINE

## (undated) DEVICE — PDS II VLT 0 107CM AG ST3: Brand: ENDOLOOP

## (undated) DEVICE — SHEATH INTRO MICRO 7F 11CM

## (undated) DEVICE — STPCK 4WY ON/OFF VLV M/COLAR FIT 45PSI STRL

## (undated) DEVICE — ST TB EXT STANDARDBORE 30IN

## (undated) DEVICE — GAUZE,SPONGE,4"X4",12PLY,STERILE,LF,2'S: Brand: MEDLINE

## (undated) DEVICE — INTENDED FOR TISSUE SEPARATION, AND OTHER PROCEDURES THAT REQUIRE A SHARP SURGICAL BLADE TO PUNCTURE OR CUT.: Brand: BARD-PARKER ® STAINLESS STEEL BLADES

## (undated) DEVICE — 2, DISPOSABLE SUCTION/IRRIGATOR WITHOUT DISPOSABLE TIP: Brand: STRYKEFLOW

## (undated) DEVICE — CATH ENDOVENOUS RF/ABL CLOSUREFAST 7F 100CM

## (undated) DEVICE — SPNG GZ STRL 2S 4X4 12PLY

## (undated) DEVICE — 3M™ STERI-STRIP™ REINFORCED ADHESIVE SKIN CLOSURES, R1546, 1/4 IN X 4 IN (6 MM X 100 MM), 10 STRIPS/ENVELOPE: Brand: 3M™ STERI-STRIP™

## (undated) DEVICE — ENDOPOUCH RETRIEVER SPECIMEN RETRIEVAL BAGS: Brand: ENDOPOUCH RETRIEVER

## (undated) DEVICE — GLV SURG SENSICARE W/ALOE PF LF 7.5 STRL

## (undated) DEVICE — BNDG ADHS CURAD FLX/FABRC 1X3IN STRL LF

## (undated) DEVICE — STERILE (14X122CM) TELESCOPICALLY-FOLDED COVER: Brand: CIV-CLEAR™ TRANSDUCER COVER

## (undated) DEVICE — ST INF 2NDARY 20DRP VNT/NOVNT 30IN

## (undated) DEVICE — ST CATH CHOLANG WKARLAN/BALN 2LUM 4F 1.25

## (undated) DEVICE — MONOPOLAR METZENBAUM SCISSOR, MINI BLADE TIP, DISPOSABLE: Brand: MONOPOLAR METZENBAUM SCISSOR, MINI BLADE TIP, DISPOSABLE

## (undated) DEVICE — INTENDED FOR TISSUE SEPARATION, AND OTHER PROCEDURES THAT REQUIRE A SHARP SURGICAL BLADE TO PUNCTURE OR CUT.: Brand: BARD-PARKER ®  SAFETY SCALPED

## (undated) DEVICE — STERILE ULTRASOUND GEL, SAFEWRAP: Brand: ECOVUE

## (undated) DEVICE — RESERVOIR,SUCTION,100CC,SILICONE: Brand: MEDLINE

## (undated) DEVICE — BNDG ELAS ECON W/CLIP 4IN 5YD LF STRL

## (undated) DEVICE — SUT VIC 3/0 RB1 27IN UD VCP215H

## (undated) DEVICE — PAD MINOR UNIVERSAL: Brand: MEDLINE INDUSTRIES, INC.

## (undated) DEVICE — STRIP,CLOSURE,WOUND,MEDI-STRIP,1/2X4: Brand: MEDLINE

## (undated) DEVICE — BANDAGE,GAUZE,BULKEE II,4.5"X4.1YD,STRL: Brand: MEDLINE

## (undated) DEVICE — BNDG ELAS W/CLIP 6IN 10YD LF STRL

## (undated) DEVICE — SYR LL TP 10ML STRL

## (undated) DEVICE — PAD GRND REM POLYHESIVE A/ DISP

## (undated) DEVICE — PK TURNOVER RM ADV

## (undated) DEVICE — SPNG GZ WOVN 4X4IN 12PLY 10/BX STRL

## (undated) DEVICE — 3M™ IOBAN™ 2 ANTIMICROBIAL INCISE DRAPE 6650EZ: Brand: IOBAN™ 2

## (undated) DEVICE — DRN JP RND NO TROC SIL 15F 3/16IN

## (undated) DEVICE — LAPAROSCOPIC MONOPOLAR CORD: Brand: VALLEYLAB

## (undated) DEVICE — TOWEL,OR,DSP,ST,BLUE,STD,4/PK,20PK/CS: Brand: MEDLINE

## (undated) DEVICE — SUT VIC 0 UR6 27IN VCP603H

## (undated) DEVICE — DRSNG SURESITE WNDW 4X4.5

## (undated) DEVICE — SYR LUERLOK 50ML

## (undated) DEVICE — ELECTRD BLD EDGE/INSUL1P 2.4X5.1MM STRL

## (undated) DEVICE — ADHS LIQ MASTISOL 2/3ML

## (undated) DEVICE — GLV SURG TRIUMPH MICRO PF LTX 7.5 STRL

## (undated) DEVICE — SUT VIC 4/0 P3 18IN UD VCP494H

## (undated) DEVICE — TRY PREP SCRB VAG PVP